# Patient Record
Sex: FEMALE | Race: WHITE | Employment: OTHER | URBAN - NONMETROPOLITAN AREA
[De-identification: names, ages, dates, MRNs, and addresses within clinical notes are randomized per-mention and may not be internally consistent; named-entity substitution may affect disease eponyms.]

---

## 2017-03-28 ENCOUNTER — OFFICE VISIT (OUTPATIENT)
Dept: ORTHOPEDIC SURGERY | Age: 82
End: 2017-03-28

## 2017-03-28 VITALS
BODY MASS INDEX: 21.67 KG/M2 | SYSTOLIC BLOOD PRESSURE: 114 MMHG | WEIGHT: 130.07 LBS | HEART RATE: 72 BPM | HEIGHT: 65 IN | DIASTOLIC BLOOD PRESSURE: 68 MMHG

## 2017-03-28 DIAGNOSIS — M77.51 RETROCALCANEAL BURSITIS, RIGHT: Primary | ICD-10-CM

## 2017-03-28 DIAGNOSIS — M76.61 TENDONITIS, ACHILLES, RIGHT: ICD-10-CM

## 2017-03-28 PROBLEM — M77.50 RETROCALCANEAL BURSITIS: Status: ACTIVE | Noted: 2017-03-28

## 2017-03-28 PROCEDURE — G8419 CALC BMI OUT NRM PARAM NOF/U: HCPCS | Performed by: ORTHOPAEDIC SURGERY

## 2017-03-28 PROCEDURE — 1090F PRES/ABSN URINE INCON ASSESS: CPT | Performed by: ORTHOPAEDIC SURGERY

## 2017-03-28 PROCEDURE — G8427 DOCREV CUR MEDS BY ELIG CLIN: HCPCS | Performed by: ORTHOPAEDIC SURGERY

## 2017-03-28 PROCEDURE — G8484 FLU IMMUNIZE NO ADMIN: HCPCS | Performed by: ORTHOPAEDIC SURGERY

## 2017-03-28 PROCEDURE — 1123F ACP DISCUSS/DSCN MKR DOCD: CPT | Performed by: ORTHOPAEDIC SURGERY

## 2017-03-28 PROCEDURE — 1036F TOBACCO NON-USER: CPT | Performed by: ORTHOPAEDIC SURGERY

## 2017-03-28 PROCEDURE — L4361 PNEUMA/VAC WALK BOOT PRE OTS: HCPCS | Performed by: ORTHOPAEDIC SURGERY

## 2017-03-28 PROCEDURE — 99213 OFFICE O/P EST LOW 20 MIN: CPT | Performed by: ORTHOPAEDIC SURGERY

## 2017-03-28 PROCEDURE — G8598 ASA/ANTIPLAT THER USED: HCPCS | Performed by: ORTHOPAEDIC SURGERY

## 2017-03-28 PROCEDURE — 4040F PNEUMOC VAC/ADMIN/RCVD: CPT | Performed by: ORTHOPAEDIC SURGERY

## 2017-04-11 ENCOUNTER — OFFICE VISIT (OUTPATIENT)
Dept: ORTHOPEDIC SURGERY | Age: 82
End: 2017-04-11

## 2017-04-11 VITALS — HEIGHT: 65 IN | WEIGHT: 130.07 LBS | BODY MASS INDEX: 21.67 KG/M2

## 2017-04-11 DIAGNOSIS — M76.61 TENDONITIS, ACHILLES, RIGHT: ICD-10-CM

## 2017-04-11 DIAGNOSIS — M77.51 RETROCALCANEAL BURSITIS, RIGHT: Primary | ICD-10-CM

## 2017-04-11 PROCEDURE — 4040F PNEUMOC VAC/ADMIN/RCVD: CPT | Performed by: ORTHOPAEDIC SURGERY

## 2017-04-11 PROCEDURE — 99213 OFFICE O/P EST LOW 20 MIN: CPT | Performed by: ORTHOPAEDIC SURGERY

## 2017-04-11 PROCEDURE — 1036F TOBACCO NON-USER: CPT | Performed by: ORTHOPAEDIC SURGERY

## 2017-04-11 PROCEDURE — G8419 CALC BMI OUT NRM PARAM NOF/U: HCPCS | Performed by: ORTHOPAEDIC SURGERY

## 2017-04-11 PROCEDURE — 1090F PRES/ABSN URINE INCON ASSESS: CPT | Performed by: ORTHOPAEDIC SURGERY

## 2017-04-11 PROCEDURE — G8598 ASA/ANTIPLAT THER USED: HCPCS | Performed by: ORTHOPAEDIC SURGERY

## 2017-04-11 PROCEDURE — G8427 DOCREV CUR MEDS BY ELIG CLIN: HCPCS | Performed by: ORTHOPAEDIC SURGERY

## 2017-04-11 PROCEDURE — 1123F ACP DISCUSS/DSCN MKR DOCD: CPT | Performed by: ORTHOPAEDIC SURGERY

## 2017-04-11 RX ORDER — DEXAMETHASONE SODIUM PHOSPHATE 4 MG/ML
INJECTION, SOLUTION INTRA-ARTICULAR; INTRALESIONAL; INTRAMUSCULAR; INTRAVENOUS; SOFT TISSUE
Qty: 30 ML | Refills: 0 | Status: SHIPPED | OUTPATIENT
Start: 2017-04-11 | End: 2018-07-04 | Stop reason: ALTCHOICE

## 2017-04-13 ENCOUNTER — HOSPITAL ENCOUNTER (OUTPATIENT)
Dept: PHYSICAL THERAPY | Age: 82
Discharge: HOME | End: 2017-04-30
Admitting: ORTHOPAEDIC SURGERY

## 2017-04-17 ENCOUNTER — HOSPITAL ENCOUNTER (OUTPATIENT)
Dept: PHYSICAL THERAPY | Age: 82
Discharge: HOME | End: 2017-04-17
Admitting: ORTHOPAEDIC SURGERY

## 2017-05-01 ENCOUNTER — HOSPITAL ENCOUNTER (OUTPATIENT)
Dept: PHYSICAL THERAPY | Age: 82
Discharge: HOME | End: 2017-05-01
Admitting: ORTHOPAEDIC SURGERY

## 2017-05-15 ENCOUNTER — OFFICE VISIT (OUTPATIENT)
Dept: ORTHOPEDIC SURGERY | Age: 82
End: 2017-05-15

## 2017-05-15 VITALS
DIASTOLIC BLOOD PRESSURE: 67 MMHG | BODY MASS INDEX: 21.67 KG/M2 | WEIGHT: 130.07 LBS | HEIGHT: 65 IN | SYSTOLIC BLOOD PRESSURE: 133 MMHG | HEART RATE: 73 BPM

## 2017-05-15 DIAGNOSIS — M75.02 ADHESIVE BURSITIS OF LEFT SHOULDER: Primary | ICD-10-CM

## 2017-05-15 PROCEDURE — 99212 OFFICE O/P EST SF 10 MIN: CPT | Performed by: ORTHOPAEDIC SURGERY

## 2017-05-31 ENCOUNTER — TELEPHONE (OUTPATIENT)
Dept: ORTHOPEDIC SURGERY | Age: 82
End: 2017-05-31

## 2017-06-01 ENCOUNTER — HOSPITAL ENCOUNTER (OUTPATIENT)
Dept: PHYSICAL THERAPY | Age: 82
Discharge: HOME | End: 2017-06-30
Attending: ORTHOPAEDIC SURGERY | Admitting: ORTHOPAEDIC SURGERY

## 2017-06-14 ENCOUNTER — OFFICE VISIT (OUTPATIENT)
Dept: ORTHOPEDIC SURGERY | Age: 82
End: 2017-06-14

## 2017-06-14 VITALS
HEART RATE: 70 BPM | SYSTOLIC BLOOD PRESSURE: 124 MMHG | HEIGHT: 65 IN | DIASTOLIC BLOOD PRESSURE: 78 MMHG | WEIGHT: 130.07 LBS | BODY MASS INDEX: 21.67 KG/M2

## 2017-06-14 DIAGNOSIS — M76.61 RIGHT ACHILLES TENDINITIS: ICD-10-CM

## 2017-06-14 DIAGNOSIS — M79.671 PAIN OF RIGHT HEEL: Primary | ICD-10-CM

## 2017-06-14 PROCEDURE — 1123F ACP DISCUSS/DSCN MKR DOCD: CPT | Performed by: PODIATRIST

## 2017-06-14 PROCEDURE — G8420 CALC BMI NORM PARAMETERS: HCPCS | Performed by: PODIATRIST

## 2017-06-14 PROCEDURE — G8598 ASA/ANTIPLAT THER USED: HCPCS | Performed by: PODIATRIST

## 2017-06-14 PROCEDURE — 1036F TOBACCO NON-USER: CPT | Performed by: PODIATRIST

## 2017-06-14 PROCEDURE — 99213 OFFICE O/P EST LOW 20 MIN: CPT | Performed by: PODIATRIST

## 2017-06-14 PROCEDURE — 1090F PRES/ABSN URINE INCON ASSESS: CPT | Performed by: PODIATRIST

## 2017-06-14 PROCEDURE — G8428 CUR MEDS NOT DOCUMENT: HCPCS | Performed by: PODIATRIST

## 2017-06-14 PROCEDURE — 73650 X-RAY EXAM OF HEEL: CPT | Performed by: PODIATRIST

## 2017-06-14 PROCEDURE — 4040F PNEUMOC VAC/ADMIN/RCVD: CPT | Performed by: PODIATRIST

## 2017-06-14 RX ORDER — DEXAMETHASONE SODIUM PHOSPHATE 4 MG/ML
INJECTION, SOLUTION INTRA-ARTICULAR; INTRALESIONAL; INTRAMUSCULAR; INTRAVENOUS; SOFT TISSUE
Qty: 30 ML | Refills: 0 | Status: SHIPPED | OUTPATIENT
Start: 2017-06-14 | End: 2018-07-04 | Stop reason: ALTCHOICE

## 2017-07-05 ENCOUNTER — OFFICE VISIT (OUTPATIENT)
Dept: ORTHOPEDIC SURGERY | Age: 82
End: 2017-07-05

## 2017-07-05 VITALS
SYSTOLIC BLOOD PRESSURE: 133 MMHG | HEART RATE: 70 BPM | BODY MASS INDEX: 21.67 KG/M2 | DIASTOLIC BLOOD PRESSURE: 75 MMHG | WEIGHT: 130.07 LBS | HEIGHT: 65 IN

## 2017-07-05 DIAGNOSIS — M76.61 TENDONITIS, ACHILLES, RIGHT: Primary | ICD-10-CM

## 2017-07-05 PROCEDURE — G8420 CALC BMI NORM PARAMETERS: HCPCS | Performed by: PODIATRIST

## 2017-07-05 PROCEDURE — 1036F TOBACCO NON-USER: CPT | Performed by: PODIATRIST

## 2017-07-05 PROCEDURE — G8427 DOCREV CUR MEDS BY ELIG CLIN: HCPCS | Performed by: PODIATRIST

## 2017-07-05 PROCEDURE — 1123F ACP DISCUSS/DSCN MKR DOCD: CPT | Performed by: PODIATRIST

## 2017-07-05 PROCEDURE — 1090F PRES/ABSN URINE INCON ASSESS: CPT | Performed by: PODIATRIST

## 2017-07-05 PROCEDURE — 4040F PNEUMOC VAC/ADMIN/RCVD: CPT | Performed by: PODIATRIST

## 2017-07-05 PROCEDURE — G8598 ASA/ANTIPLAT THER USED: HCPCS | Performed by: PODIATRIST

## 2017-07-05 PROCEDURE — 99213 OFFICE O/P EST LOW 20 MIN: CPT | Performed by: PODIATRIST

## 2017-07-05 RX ORDER — CEPHALEXIN 500 MG/1
CAPSULE ORAL
Refills: 0 | COMMUNITY
Start: 2017-05-31 | End: 2017-07-05 | Stop reason: ALTCHOICE

## 2017-07-05 RX ORDER — PREDNISONE 10 MG/1
TABLET ORAL
Qty: 26 TABLET | Refills: 0 | Status: SHIPPED | OUTPATIENT
Start: 2017-07-05 | End: 2018-07-04 | Stop reason: ALTCHOICE

## 2017-07-05 RX ORDER — FUROSEMIDE 20 MG/1
20 TABLET ORAL 2 TIMES DAILY
COMMUNITY
End: 2018-07-04 | Stop reason: ALTCHOICE

## 2017-07-05 RX ORDER — TESTOSTERONE CYPIONATE 200 MG/ML
INJECTION INTRAMUSCULAR
Refills: 0 | COMMUNITY
Start: 2017-04-11 | End: 2018-07-04 | Stop reason: ALTCHOICE

## 2017-07-05 RX ORDER — M-VIT,TX,IRON,MINS/CALC/FOLIC 27MG-0.4MG
1 TABLET ORAL DAILY
COMMUNITY

## 2017-07-12 ENCOUNTER — HOSPITAL ENCOUNTER (OUTPATIENT)
Dept: SURGERY | Age: 82
Discharge: HOME | End: 2017-07-12
Attending: OPHTHALMOLOGY | Admitting: OPHTHALMOLOGY

## 2017-07-12 VITALS — HEART RATE: 103 BPM | SYSTOLIC BLOOD PRESSURE: 143 MMHG | DIASTOLIC BLOOD PRESSURE: 80 MMHG

## 2017-07-12 RX ORDER — PILOCARPINE HYDROCHLORIDE 20 MG/ML
1 SOLUTION/ DROPS OPHTHALMIC ONCE
Status: COMPLETED | OUTPATIENT
Start: 2017-07-12 | End: 2017-07-12

## 2017-07-12 RX ORDER — PREDNISOLONE ACETATE 10 MG/ML
1 SUSPENSION/ DROPS OPHTHALMIC
Status: COMPLETED | OUTPATIENT
Start: 2017-07-12 | End: 2017-07-12

## 2017-07-12 RX ORDER — PROPARACAINE HYDROCHLORIDE 5 MG/ML
1 SOLUTION/ DROPS OPHTHALMIC
Status: COMPLETED | OUTPATIENT
Start: 2017-07-12 | End: 2017-07-12

## 2017-07-12 RX ADMIN — PILOCARPINE HYDROCHLORIDE 1 DROP: 20 SOLUTION/ DROPS OPHTHALMIC at 13:54

## 2017-07-12 RX ADMIN — PROPARACAINE HYDROCHLORIDE 1 DROP: 5 SOLUTION/ DROPS OPHTHALMIC at 14:28

## 2017-07-12 RX ADMIN — PREDNISOLONE ACETATE 1 DROP: 10 SUSPENSION/ DROPS OPHTHALMIC at 14:32

## 2017-07-26 ENCOUNTER — HOSPITAL ENCOUNTER (OUTPATIENT)
Dept: SURGERY | Age: 82
Discharge: HOME | End: 2017-07-26
Attending: OPHTHALMOLOGY | Admitting: OPHTHALMOLOGY

## 2017-07-26 VITALS
HEART RATE: 68 BPM | DIASTOLIC BLOOD PRESSURE: 61 MMHG | OXYGEN SATURATION: 97 % | SYSTOLIC BLOOD PRESSURE: 133 MMHG | RESPIRATION RATE: 14 BRPM

## 2017-07-26 RX ORDER — PREDNISOLONE ACETATE 10 MG/ML
1 SUSPENSION/ DROPS OPHTHALMIC
Status: COMPLETED | OUTPATIENT
Start: 2017-07-26 | End: 2017-07-26

## 2017-07-26 RX ORDER — PROPARACAINE HYDROCHLORIDE 5 MG/ML
1 SOLUTION/ DROPS OPHTHALMIC
Status: COMPLETED | OUTPATIENT
Start: 2017-07-26 | End: 2017-07-26

## 2017-07-26 RX ORDER — PILOCARPINE HYDROCHLORIDE 20 MG/ML
1 SOLUTION/ DROPS OPHTHALMIC
Status: COMPLETED | OUTPATIENT
Start: 2017-07-26 | End: 2017-07-26

## 2017-07-26 RX ADMIN — PROPARACAINE HYDROCHLORIDE 1 DROP: 5 SOLUTION/ DROPS OPHTHALMIC at 14:18

## 2017-07-26 RX ADMIN — PILOCARPINE HYDROCHLORIDE 1 DROP: 20 SOLUTION/ DROPS OPHTHALMIC at 13:09

## 2017-07-26 RX ADMIN — PREDNISOLONE ACETATE 1 DROP: 10 SUSPENSION/ DROPS OPHTHALMIC at 14:23

## 2017-10-10 ENCOUNTER — HOSPITAL ENCOUNTER (OUTPATIENT)
Dept: WOUND CARE | Age: 82
Discharge: HOME | End: 2017-10-10
Attending: CLINICAL NURSE SPECIALIST | Admitting: CLINICAL NURSE SPECIALIST

## 2017-11-13 ENCOUNTER — OFFICE VISIT (OUTPATIENT)
Dept: ORTHOPEDIC SURGERY | Age: 82
End: 2017-11-13

## 2017-11-13 VITALS
HEART RATE: 73 BPM | DIASTOLIC BLOOD PRESSURE: 63 MMHG | BODY MASS INDEX: 21.67 KG/M2 | SYSTOLIC BLOOD PRESSURE: 134 MMHG | WEIGHT: 130.07 LBS | HEIGHT: 65 IN

## 2017-11-13 DIAGNOSIS — M25.512 CHRONIC LEFT SHOULDER PAIN: Primary | ICD-10-CM

## 2017-11-13 DIAGNOSIS — M75.02 ADHESIVE BURSITIS OF LEFT SHOULDER: ICD-10-CM

## 2017-11-13 DIAGNOSIS — S73.102A SPRAIN OF LEFT HIP, INITIAL ENCOUNTER: ICD-10-CM

## 2017-11-13 DIAGNOSIS — G89.29 CHRONIC LEFT SHOULDER PAIN: Primary | ICD-10-CM

## 2017-11-13 PROCEDURE — 73030 X-RAY EXAM OF SHOULDER: CPT | Performed by: ORTHOPAEDIC SURGERY

## 2017-11-13 PROCEDURE — 99213 OFFICE O/P EST LOW 20 MIN: CPT | Performed by: ORTHOPAEDIC SURGERY

## 2017-11-13 NOTE — PROGRESS NOTES
calcification in the subacromial space and not sure if this can be resected or if the patient is a candidate for an arthroplasty. She'll follow-up with Dr. Neetu Zapien for his evaluation and recommendations.     Demetri Aguilar

## 2017-12-05 ENCOUNTER — OFFICE VISIT (OUTPATIENT)
Dept: ORTHOPEDIC SURGERY | Age: 82
End: 2017-12-05

## 2017-12-05 ENCOUNTER — TELEPHONE (OUTPATIENT)
Dept: ORTHOPEDIC SURGERY | Age: 82
End: 2017-12-05

## 2017-12-05 VITALS — WEIGHT: 130.07 LBS | HEIGHT: 63 IN | BODY MASS INDEX: 23.05 KG/M2

## 2017-12-05 DIAGNOSIS — M25.512 LEFT SHOULDER PAIN, UNSPECIFIED CHRONICITY: Primary | ICD-10-CM

## 2017-12-05 DIAGNOSIS — M75.112 INCOMPLETE TEAR OF LEFT ROTATOR CUFF: ICD-10-CM

## 2017-12-05 DIAGNOSIS — S40.012D CONTUSION OF LEFT SHOULDER, SUBSEQUENT ENCOUNTER: ICD-10-CM

## 2017-12-05 DIAGNOSIS — M75.02 ADHESIVE CAPSULITIS OF LEFT SHOULDER: ICD-10-CM

## 2017-12-05 PROCEDURE — 99213 OFFICE O/P EST LOW 20 MIN: CPT | Performed by: ORTHOPAEDIC SURGERY

## 2017-12-05 NOTE — TELEPHONE ENCOUNTER
Received request from Mitra Menchaca rn W/[facility] asking for Dr. Charles Lin to review Rx medications. Paper work scanned into [employer] w/his clinic has been notified this was in [address] [place] [address] for review.     Sent message via in-basket staff message to Rachele TRINH

## 2017-12-05 NOTE — PROGRESS NOTES
REFERRING PHYSICIAN: Tori Dunlap M.D.    [address] [place] [address]:  Left shoulder pain     HISTORY OF PRESENT ILLNESS:  Viv Blanco is a [id number] right-hand-dominant female who presents today for evaluation and consultation of her left shoulder at the request of Tori Dunlap M.D.  she's had a long history of left shoulder problems. She had surgery in 1992 for rotator cuff repair. I do not have that operative report to review. The patient states that she was injured at work in 2003. 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 She was injured when she fell. Since that time, she describes significant left shoulder dysfunction consistent with a pseudo-paralytic extremity. Her symptoms is not significantly worsened since then but she seems to be more disabled as a result them. REVIEW OF SYSTEMS:  Relevant review of systems reviewed and available in the patient's chart. PHYSICAL EXAMINATION: Inspection of the left shoulder reveals warm, dry, intact skin. There is no adenopathy. The distal neurovascular exam is grossly intact. There is some deltoid atrophy. She has less than 20° of active abduction and forward elevation. If the arm is position at 90° abduction. She is able to maintain obtain the extremity in that position. The remainder of exam is limited by significant weakness and guarding. Sensation is intact about the lateral aspect the shoulder. There is no obvious glenohumeral instability. Examination of the contralateral shoulder reveals no atrophy or deformity. The skin is warm and dry. Range of motion is within normal limits. There is no focal tenderness with palpation. Provocative SLAP, biceps tension, apprehension AC joint or rotator cuff tests are negative. Strength is graded 5/5 in all muscle groups. The distal neurovascular exam is grossly intact. Cervical spine: The skin is warm and dry. There is no swelling, warmth, or erythema. Range of motion is within normal limits. There is no paraspinal or spinous process tenderness. Spurling's sign is negative and did not produce shoulder pain. The distal neurovascular exam is grossly intact. X-RAYS: 4 views of the left shoulder were obtained in the office and reviewed today. There is some generalized osteopenia and some mild glenohumeral arthritis. There are 2 retained metallic anchors in the greater tuberosity. There is near complete obliteration of the acromiohumeral interval.  There are some changes about the acromion and it is possible that she has an acromial spine fracture. There are degenerative changes about the acromioclavicular joint. ASSESSMENT:    1. Left shoulder pseudoparalysis  2. Massive rotator cuff tear  3. Possible acromial spine fracture    PLAN:  I had a detailed discussion with Viry Avelar and her family. Although she is quite limited as a result of her shoulder pathology, she is not interested in pursuing surgery at this time. Should she consider surgical options, she would 1st benefit from a CT scan to identify the presence or absence of any acromial spine pathology. I'll see her back at her request.  She and her family agreed with our plan.

## 2018-03-27 ENCOUNTER — HOSPITAL ENCOUNTER (OUTPATIENT)
Dept: OTHER | Age: 83
Discharge: HOME | End: 2018-03-27
Attending: PHYSICIAN ASSISTANT | Admitting: PHYSICIAN ASSISTANT

## 2018-03-27 DIAGNOSIS — R06.02 SHORTNESS OF BREATH: ICD-10-CM

## 2018-07-04 PROBLEM — R07.9 CHEST PAIN: Status: ACTIVE | Noted: 2018-07-04

## 2018-07-27 ENCOUNTER — OFFICE VISIT (OUTPATIENT)
Dept: ORTHOPEDIC SURGERY | Age: 83
End: 2018-07-27

## 2018-07-27 VITALS
HEIGHT: 68 IN | HEART RATE: 97 BPM | BODY MASS INDEX: 21.98 KG/M2 | WEIGHT: 145 LBS | SYSTOLIC BLOOD PRESSURE: 132 MMHG | DIASTOLIC BLOOD PRESSURE: 74 MMHG

## 2018-07-27 DIAGNOSIS — M70.62 TROCHANTERIC BURSITIS OF LEFT HIP: Primary | ICD-10-CM

## 2018-07-27 DIAGNOSIS — M75.02 ADHESIVE BURSITIS OF LEFT SHOULDER: ICD-10-CM

## 2018-07-27 PROCEDURE — 99212 OFFICE O/P EST SF 10 MIN: CPT | Performed by: ORTHOPAEDIC SURGERY

## 2018-07-27 RX ORDER — CITALOPRAM 10 MG/1
TABLET ORAL
Status: ON HOLD | COMMUNITY
Start: 2018-06-21 | End: 2019-09-13 | Stop reason: HOSPADM

## 2018-07-27 RX ORDER — DULOXETIN HYDROCHLORIDE 20 MG/1
CAPSULE, DELAYED RELEASE ORAL
Status: ON HOLD | COMMUNITY
End: 2019-09-13 | Stop reason: HOSPADM

## 2018-07-27 RX ORDER — ATORVASTATIN CALCIUM 40 MG/1
TABLET, FILM COATED ORAL
Status: ON HOLD | COMMUNITY
Start: 2018-07-05 | End: 2019-09-13 | Stop reason: HOSPADM

## 2018-07-27 RX ORDER — FENOFIBRATE 145 MG/1
200 TABLET, COATED ORAL
COMMUNITY

## 2018-07-27 NOTE — PROGRESS NOTES
History  Lisa Chinchilla is a 80 y.o. female who returns for follow-up of her left shoulder. She is developed significant arthritic changes in her left shoulder and has been diagnosed with a ventricular bursitis the left hip and now reports she is getting similar symptoms on her right hip. She reports today that they have subsided and it improved. .   Symptoms are has improved. Pain level today is 3/10. Treatment to date includes topical anti-inflammatory diclofenac . Patient's medications, allergies, past medical, surgical, social and family histories were reviewed and updated as appropriate. Review of Systems  Relevant review of systems reviewed and available in the patient's chart    Primary Area of CC: Patient with some residual tenderness along the right hip left hip is asymptomatic on exam today. She continues to have a degenerative symptoms in her left shoulder. She has been evaluated by Dr. Lynda Arce in the past for shoulder arthroplasty but at her age she elected not to proceed with any intervention. Examination proximal and distal to the injured area show good overall ROM, no bony prominence or soft tissue tenderness, no instability or excessive stiffness. Radiology:       Impression   Diagnosis Orders   1. Trochanteric bursitis of left hip     2. Adhesive bursitis of left shoulder              Plan  The patient would like to follow-up in the surgery in the [place] office. He can follow up either with Dr. Marylene Blades with Dr. Horace Boss.     Amando Farris

## 2018-08-21 ENCOUNTER — OFFICE VISIT (OUTPATIENT)
Dept: ORTHOPEDIC SURGERY | Age: 83
End: 2018-08-21

## 2018-08-21 VITALS — BODY MASS INDEX: 22.2 KG/M2 | HEIGHT: 64 IN | WEIGHT: 130 LBS

## 2018-08-21 DIAGNOSIS — R52 PAIN: Primary | ICD-10-CM

## 2018-08-21 DIAGNOSIS — M75.112 INCOMPLETE TEAR OF LEFT ROTATOR CUFF: ICD-10-CM

## 2018-08-21 DIAGNOSIS — M70.62 TROCHANTERIC BURSITIS OF LEFT HIP: ICD-10-CM

## 2018-08-21 PROCEDURE — 99213 OFFICE O/P EST LOW 20 MIN: CPT | Performed by: ORTHOPAEDIC SURGERY

## 2018-08-21 NOTE — PROGRESS NOTES
Chief Complaint:  Hip Pain (Fell on ice in  landing on her L hip/shoulder - HAS BEEN A SHYBUT PATIENTS SINCE THE S - NO SX ON HIP/SHOULDER - SAW [facility] IN 2017 REQUESTED CT SCAN- DOESNT REMEBER IF SHE HAD IT DONE , Massive rotator cuff tear, )      SUBJECTIVE:  Juaquin Blancas is a 80 y.o. female who returns today for evaluation of left hip and left shoulder, she has been on long-standing patient of Dr. Asif Carranza and has been under his care for workman's comp injury that occurred in , and his skilled nursing she is being referred to me to continue treatment. Treatment to date includes Voltaren cream which seems to alleviate her symptoms. Patient is also well known to me as I treated her in the past for lower extremity weakness, and also treated her   for knee osteoarthritis. Today she has 0 out of 10 pain in the hip and shoulder she has significant limitations with using the left shoulder secondary to pseudoparalysis    Unrelated to her shoulder and hip pain patient is very tearful on exam today stating that she is having a difficult time doing her activities of daily living including making meals, doing laundry, taking medications and she does not have family member readily available to help her. She is asking for guidance in this area. Pain Assessment:  Pain Assessment  Location of Pain: Pelvis  Location Modifiers: Left  Severity of Pain: 0  Work-Related Injury: Yes  Are there other pain locations you wish to document?: No      OBJECTIVE:  Vital Signs:  Vitals:    18 1051   Weight: 130 lb (59 kg)   Height: 5' 4\" (1.626 m)       Appearance: alert, well appearing, and in no distress, oriented to person, place, and time and normal appearing weight. Physical exam:   She has tenderness to palpation to the left hip greater trochanter consistent with bursitis.   She is currently ambulating without any assistive devices she has no pain with flexion, extension or internal and external rotation. She has 4 out of 5 strength with hip flexion and extension. Distal neurovascular exam is intact. She has limited use of the left arm with active forward flexion to proximally 45°, she is unable to hold her arm at shoulder height against gravity consistent with a positive drop arm test, she does contract the deltoid and is able to abductor arm to 45°. She has some discomfort to palpation over the proximal humerus. 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  Pain Yes    Incomplete tear of left rotator cuff     Trochanteric bursitis of left hip        Office Procedures:  No orders of the defined types were placed in this encounter. No orders of the defined types were placed in this encounter. Treatment Plan: At this point we will continue with the conservative treatment she has been receiving by Dr. Hudson Neumann, , we've renewed her Voltaren cream, she will use that as needed. He'll follow up with me every 4-6 months for reevaluation. Patient agrees with this plan, all of their questions were answered best of our ability and to their satisfaction. My assistant will get in touch with [facility] senior care services see if there is any homecare assistance she will be eligible for.     Ivana Orellana

## 2018-08-24 ENCOUNTER — TELEPHONE (OUTPATIENT)
Dept: ORTHOPEDIC SURGERY | Age: 83
End: 2018-08-24

## 2018-08-24 ENCOUNTER — HOSPITAL ENCOUNTER (OUTPATIENT)
Age: 83
Discharge: HOME | End: 2018-08-24
Payer: MEDICARE

## 2018-08-24 ENCOUNTER — HOSPITAL ENCOUNTER (OUTPATIENT)
Dept: GENERAL RADIOLOGY | Age: 83
Discharge: HOME | End: 2018-08-24
Payer: MEDICARE

## 2018-08-24 DIAGNOSIS — M25.551 RIGHT HIP PAIN: ICD-10-CM

## 2018-08-24 PROCEDURE — 73502 X-RAY EXAM HIP UNI 2-3 VIEWS: CPT

## 2019-01-15 ENCOUNTER — OFFICE VISIT (OUTPATIENT)
Dept: ORTHOPEDIC SURGERY | Age: 84
End: 2019-01-15
Payer: COMMERCIAL

## 2019-01-15 VITALS — HEIGHT: 64 IN | BODY MASS INDEX: 22.21 KG/M2 | WEIGHT: 130.07 LBS

## 2019-01-15 DIAGNOSIS — M70.62 TROCHANTERIC BURSITIS OF LEFT HIP: ICD-10-CM

## 2019-01-15 DIAGNOSIS — S73.102A SPRAIN OF LEFT HIP, INITIAL ENCOUNTER: ICD-10-CM

## 2019-01-15 DIAGNOSIS — M81.0 OSTEOPOROSIS, UNSPECIFIED OSTEOPOROSIS TYPE, UNSPECIFIED PATHOLOGICAL FRACTURE PRESENCE: Primary | ICD-10-CM

## 2019-01-15 PROCEDURE — 99213 OFFICE O/P EST LOW 20 MIN: CPT | Performed by: ORTHOPAEDIC SURGERY

## 2019-05-01 DIAGNOSIS — M75.112 INCOMPLETE TEAR OF LEFT ROTATOR CUFF: ICD-10-CM

## 2019-05-06 ENCOUNTER — HOSPITAL ENCOUNTER (OUTPATIENT)
Dept: CT IMAGING | Age: 84
Discharge: HOME | End: 2019-05-06
Payer: MEDICARE

## 2019-05-06 ENCOUNTER — HOSPITAL ENCOUNTER (OUTPATIENT)
Age: 84
Discharge: HOME | End: 2019-05-06
Payer: MEDICARE

## 2019-05-06 DIAGNOSIS — R42 DIZZINESS: ICD-10-CM

## 2019-05-06 DIAGNOSIS — R51.9 GENERALIZED HEADACHE: ICD-10-CM

## 2019-05-06 LAB
ANION GAP SERPL CALCULATED.3IONS-SCNC: 10 MMOL/L (ref 3–16)
BUN BLDV-MCNC: 21 MG/DL (ref 7–20)
CALCIUM SERPL-MCNC: 10.3 MG/DL (ref 8.3–10.6)
CHLORIDE BLD-SCNC: 103 MMOL/L (ref 99–110)
CO2: 25 MMOL/L (ref 21–32)
CREAT SERPL-MCNC: 0.9 MG/DL (ref 0.6–1.2)
GFR AFRICAN AMERICAN: >60
GFR NON-AFRICAN AMERICAN: 59
GLUCOSE BLD-MCNC: 123 MG/DL (ref 70–99)
POTASSIUM SERPL-SCNC: 4.3 MMOL/L (ref 3.5–5.1)
SODIUM BLD-SCNC: 138 MMOL/L (ref 136–145)

## 2019-05-06 PROCEDURE — 70470 CT HEAD/BRAIN W/O & W/DYE: CPT

## 2019-05-06 PROCEDURE — 80048 BASIC METABOLIC PNL TOTAL CA: CPT

## 2019-05-06 PROCEDURE — 6360000004 HC RX CONTRAST MEDICATION: Performed by: INTERNAL MEDICINE

## 2019-05-06 PROCEDURE — 36415 COLL VENOUS BLD VENIPUNCTURE: CPT

## 2019-05-06 RX ADMIN — IOPAMIDOL 75 ML: 755 INJECTION, SOLUTION INTRAVENOUS at 17:57

## 2019-05-12 ENCOUNTER — APPOINTMENT (OUTPATIENT)
Dept: GENERAL RADIOLOGY | Age: 84
End: 2019-05-12
Payer: MEDICARE

## 2019-05-12 ENCOUNTER — APPOINTMENT (OUTPATIENT)
Dept: CT IMAGING | Age: 84
End: 2019-05-12
Payer: MEDICARE

## 2019-05-12 ENCOUNTER — HOSPITAL ENCOUNTER (EMERGENCY)
Age: 84
Discharge: HOME | End: 2019-05-12
Attending: EMERGENCY MEDICINE
Payer: MEDICARE

## 2019-05-12 VITALS
OXYGEN SATURATION: 98 % | DIASTOLIC BLOOD PRESSURE: 86 MMHG | HEART RATE: 76 BPM | TEMPERATURE: 97.8 F | SYSTOLIC BLOOD PRESSURE: 141 MMHG | RESPIRATION RATE: 16 BRPM

## 2019-05-12 DIAGNOSIS — S22.32XA CLOSED FRACTURE OF ONE RIB OF LEFT SIDE, INITIAL ENCOUNTER: Primary | ICD-10-CM
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
## 2019-05-12 LAB
A/G RATIO: 1.3 (ref 1.1–2.2)
ALBUMIN SERPL-MCNC: 4.3 G/DL (ref 3.4–5)
ALP BLD-CCNC: 59 U/L (ref 40–129)
ALT SERPL-CCNC: 81 U/L (ref 10–40)
ANION GAP SERPL CALCULATED.3IONS-SCNC: 13 MMOL/L (ref 3–16)
AST SERPL-CCNC: 79 U/L (ref 15–37)
BASOPHILS ABSOLUTE: 0.1 K/UL (ref 0–0.2)
BASOPHILS RELATIVE PERCENT: 1.4 %
BILIRUB SERPL-MCNC: 0.5 MG/DL (ref 0–1)
BUN BLDV-MCNC: 23 MG/DL (ref 7–20)
CALCIUM SERPL-MCNC: 10.2 MG/DL (ref 8.3–10.6)
CHLORIDE BLD-SCNC: 103 MMOL/L (ref 99–110)
CO2: 25 MMOL/L (ref 21–32)
CREAT SERPL-MCNC: 0.7 MG/DL (ref 0.6–1.2)
EOSINOPHILS ABSOLUTE: 0.2 K/UL (ref 0–0.6)
EOSINOPHILS RELATIVE PERCENT: 3.4 %
GFR AFRICAN AMERICAN: >60
GFR NON-AFRICAN AMERICAN: >60
GLOBULIN: 3.4 G/DL
GLUCOSE BLD-MCNC: 174 MG/DL (ref 70–99)
HCT VFR BLD CALC: 42.8 % (ref 36–48)
HEMOGLOBIN: 14.6 G/DL (ref 12–16)
INR BLD: 1.23 (ref 0.86–1.14)
LYMPHOCYTES ABSOLUTE: 1.7 K/UL (ref 1–5.1)
LYMPHOCYTES RELATIVE PERCENT: 28.4 %
MCH RBC QN AUTO: 33.5 PG (ref 26–34)
MCHC RBC AUTO-ENTMCNC: 34.1 G/DL (ref 31–36)
MCV RBC AUTO: 98.2 FL (ref 80–100)
MONOCYTES ABSOLUTE: 0.4 K/UL (ref 0–1.3)
MONOCYTES RELATIVE PERCENT: 6.9 %
NEUTROPHILS ABSOLUTE: 3.6 K/UL (ref 1.7–7.7)
NEUTROPHILS RELATIVE PERCENT: 59.9 %
PDW BLD-RTO: 11.8 % (ref 12.4–15.4)
PLATELET # BLD: 118 K/UL (ref 135–450)
PMV BLD AUTO: 10.4 FL (ref 5–10.5)
POTASSIUM SERPL-SCNC: 4.7 MMOL/L (ref 3.5–5.1)
PROTHROMBIN TIME: 13.9 SEC (ref 9.8–13)
RBC # BLD: 4.36 M/UL (ref 4–5.2)
SODIUM BLD-SCNC: 141 MMOL/L (ref 136–145)
TOTAL PROTEIN: 7.7 G/DL (ref 6.4–8.2)
WBC # BLD: 6 K/UL (ref 4–11)

## 2019-05-12 PROCEDURE — 80053 COMPREHEN METABOLIC PANEL: CPT

## 2019-05-12 PROCEDURE — 85025 COMPLETE CBC W/AUTO DIFF WBC: CPT

## 2019-05-12 PROCEDURE — 85610 PROTHROMBIN TIME: CPT

## 2019-05-12 PROCEDURE — 74177 CT ABD & PELVIS W/CONTRAST: CPT

## 2019-05-12 PROCEDURE — 6360000004 HC RX CONTRAST MEDICATION: Performed by: EMERGENCY MEDICINE

## 2019-05-12 PROCEDURE — 96374 THER/PROPH/DIAG INJ IV PUSH: CPT

## 2019-05-12 PROCEDURE — 6360000002 HC RX W HCPCS: Performed by: EMERGENCY MEDICINE

## 2019-05-12 PROCEDURE — 2580000003 HC RX 258: Performed by: EMERGENCY MEDICINE

## 2019-05-12 PROCEDURE — 96375 TX/PRO/DX INJ NEW DRUG ADDON: CPT

## 2019-05-12 PROCEDURE — 71101 X-RAY EXAM UNILAT RIBS/CHEST: CPT

## 2019-05-12 PROCEDURE — 6360000002 HC RX W HCPCS

## 2019-05-12 PROCEDURE — 36415 COLL VENOUS BLD VENIPUNCTURE: CPT

## 2019-05-12 PROCEDURE — 99284 EMERGENCY DEPT VISIT MOD MDM: CPT

## 2019-05-12 RX ORDER — APIXABAN 2.5 MG/1
2.5 TABLET, FILM COATED ORAL DAILY
Refills: 6 | Status: ON HOLD | COMMUNITY
Start: 2019-04-30 | End: 2019-09-13 | Stop reason: HOSPADM

## 2019-05-12 RX ORDER — SODIUM CHLORIDE 9 MG/ML
INJECTION, SOLUTION INTRAVENOUS CONTINUOUS
Status: DISCONTINUED | OUTPATIENT
Start: 2019-05-12 | End: 2019-05-12 | Stop reason: HOSPADM

## 2019-05-12 RX ORDER — ONDANSETRON 2 MG/ML
4 INJECTION INTRAMUSCULAR; INTRAVENOUS ONCE
Status: COMPLETED | OUTPATIENT
Start: 2019-05-12 | End: 2019-05-12

## 2019-05-12 RX ORDER — MORPHINE SULFATE 4 MG/ML
4 INJECTION, SOLUTION INTRAMUSCULAR; INTRAVENOUS ONCE
Status: DISCONTINUED | OUTPATIENT
Start: 2019-05-12 | End: 2019-05-12 | Stop reason: HOSPADM

## 2019-05-12 RX ORDER — MORPHINE SULFATE 10 MG/ML
INJECTION, SOLUTION INTRAMUSCULAR; INTRAVENOUS
Status: COMPLETED
Start: 2019-05-12 | End: 2019-05-12

## 2019-05-12 RX ORDER — HYDROCODONE BITATRATE AND ACETAMINOPHEN 5; 325 MG/1; MG/1
1 TABLET ORAL EVERY 6 HOURS PRN
Qty: 10 TABLET | Refills: 0 | Status: SHIPPED | OUTPATIENT
Start: 2019-05-12 | End: 2019-05-16

## 2019-05-12 RX ADMIN — SODIUM CHLORIDE: 9 INJECTION, SOLUTION INTRAVENOUS at 10:31

## 2019-05-12 RX ADMIN — MORPHINE SULFATE 10 MG: 10 INJECTION INTRAVENOUS at 10:31

## 2019-05-12 RX ADMIN — IOPAMIDOL 75 ML: 755 INJECTION, SOLUTION INTRAVENOUS at 11:15

## 2019-05-12 RX ADMIN — ONDANSETRON 4 MG: 2 INJECTION INTRAMUSCULAR; INTRAVENOUS at 10:31

## 2019-05-12 ASSESSMENT — ENCOUNTER SYMPTOMS
WHEEZING: 0
SHORTNESS OF BREATH: 1
CHEST TIGHTNESS: 0
CHOKING: 0
ABDOMINAL PAIN: 0
BACK PAIN: 1

## 2019-05-12 ASSESSMENT — PAIN SCALES - GENERAL: PAINLEVEL_OUTOF10: 9

## 2019-05-12 NOTE — ED PROVIDER NOTES
Patient states that she was fearful this was her last breath she said every time she breathes it hurt tremendously  She laid there for a while and could not get up but then finally was able to get up she called her son and they brought her out here by car    The history is provided by the patient. Fall   The accident occurred less than 1 hour ago. The fall occurred while walking. She fell from a height of 3 to 5 ft. Impact surface: Impact service was a corner R side of the bathtub. There was no blood loss. Point of impact: Left ribs. Pain location: Left ribs. The pain is at a severity of 10/10. She was ambulatory at the scene. There was no entrapment after the fall. There was no drug use involved in the accident. There was no alcohol use involved in the accident. Pertinent negatives include no fever, no numbness, no abdominal pain, no hematuria, no headaches and no loss of consciousness. The symptoms are aggravated by activity. She has tried nothing for the symptoms. Review of Systems   Constitutional: Positive for activity change and appetite change. Negative for chills and fever. HENT: Negative for congestion. Eyes: Negative for visual disturbance. Respiratory: Positive for shortness of breath. Negative for choking, chest tightness and wheezing. Cardiovascular: Positive for chest pain. Negative for palpitations and leg swelling. Gastrointestinal: Negative for abdominal pain. Genitourinary: Negative for difficulty urinating, hematuria, menstrual problem, pelvic pain and urgency. Musculoskeletal: Positive for back pain. Negative for neck pain. Neurological: Negative for dizziness, tremors, seizures, loss of consciousness, syncope, facial asymmetry, speech difficulty, weakness, light-headedness, numbness and headaches. Psychiatric/Behavioral: Negative for behavioral problems. All other systems reviewed and are negative.     Patient Vitals for the past 24 hrs:   BP Temp Temp src Pulse Resp SpO2   05/12/19 1008 (!) 149/66 97.8 °F (36.6 °C) Oral 69 18 98 %       Physical Exam   Constitutional: She is oriented to person, place, and time. Vital signs are normal. She appears well-developed and well-nourished. She appears distressed. HENT:   Head: Normocephalic. Eyes: Pupils are equal, round, and reactive to light. Conjunctivae and EOM are normal.   Neck: Normal range of motion. Neck supple. No thyromegaly present. Cardiovascular: Normal rate, regular rhythm, normal heart sounds and intact distal pulses. Exam reveals no gallop and no friction rub. No murmur heard. Pulmonary/Chest: Effort normal and breath sounds normal. No respiratory distress. She has no wheezes. She has no rhonchi. She has no rales. Abdominal: Soft. Bowel sounds are normal. She exhibits no distension. There is no tenderness. Neurological: She is alert and oriented to person, place, and time. She displays normal reflexes. No cranial nerve deficit or sensory deficit. She exhibits normal muscle tone. Coordination normal. GCS eye subscore is 4. GCS verbal subscore is 5. GCS motor subscore is 6. Skin: She is not diaphoretic. Psychiatric: She has a normal mood and affect. Her behavior is normal.   Nursing note and vitals reviewed. Procedures    MDM         Labs      Radiology      EKG Interpretation.      Past Medical History:   Diagnosis Date    Arthritis     CAD (coronary artery disease)     Cancer of cheek ([facility] [address] 4/15/2010    Diabetes mellitus ([facility] [address] [facility]     Hyperlipidemia     Hypertension     Osteoporosis 1/15/2019    Retrocalcaneal bursitis 3/28/2017    TB (pulmonary tuberculosis) 2010       Past Surgical History:   Procedure Laterality Date    CARDIAC CATHETERIZATION      x 4 all normal    CHOLECYSTECTOMY, LAPAROSCOPIC N/A 5/1/13    LIVER BIOPSY; EXCISION OF NECROTIC LYMP NODE OF CROQUET; UMBILICAL HERNIA REPAIR    LUNG SURGERY      partial right lower lobectomy    SHOULDER SURGERY      THROAT SURGERY      vocal cord polyp       History reviewed. No pertinent family history. Social History     Socioeconomic History    Marital status:       Spouse name: Not on file    Number of children: Not on file    Years of education: Not on file    Highest education level: Not on file   Occupational History    Not on file   Social Needs    Financial resource strain: Not on file    Food insecurity:     Worry: Not on file     Inability: Not on file    Transportation needs:     Medical: Not on file     Non-medical: Not on file   Tobacco Use    Smoking status: Never Smoker    Smokeless tobacco: Never Used   Substance and Sexual Activity    Alcohol use: No     Alcohol/week: 0.0 oz    Drug use: No    Sexual activity: Not Currently     Partners: Male   Lifestyle    Physical activity:     Days per week: Not on file     Minutes per session: Not on file    Stress: Not on file   Relationships    Social connections:     Talks on phone: Not on file     Gets together: Not on file     Attends Worship service: Not on file     Active member of club or organization: Not on file     Attends meetings of clubs or organizations: Not on file     Relationship status: Not on file    Intimate partner violence:     Fear of current or ex partner: Not on file     Emotionally abused: Not on file     Physically abused: Not on file     Forced sexual activity: Not on file   Other Topics Concern    Not on file   Social History Narrative    Not on file       Patient Vitals for the past 24 hrs:   BP Temp Temp src Pulse Resp SpO2   05/12/19 1048 (!) 156/96 -- -- 61 16 97 %   05/12/19 1008 (!) 149/66 97.8 °F (36.6 °C) Oral 69 18 98 %         Medications   0.9 % sodium chloride infusion ( Intravenous New Bag 5/12/19 1031)   morphine (PF) injection 4 mg (4 mg Intravenous Not Given 5/12/19 1031)   ondansetron (ZOFRAN) injection 4 mg (4 mg Intravenous Given 5/12/19 1031)   morphine 10 MG/ML injection (10 mg  Given 5/12/19 1031) iopamidol (ISOVUE-370) 76 % injection 75 mL (75 mLs Intravenous Given 5/12/19 1115)       Results for orders placed or performed during the hospital encounter of 05/12/19   CBC Auto Differential   Result Value Ref Range    WBC 6.0 4.0 - 11.0 K/uL    RBC 4.36 4.00 - 5.20 M/uL    Hemoglobin 14.6 12.0 - 16.0 g/dL    Hematocrit 42.8 36.0 - 48.0 %    MCV 98.2 80.0 - 100.0 fL    MCH 33.5 26.0 - 34.0 pg    MCHC 34.1 31.0 - 36.0 g/dL    RDW 11.8 (L) 12.4 - 15.4 %    Platelets 903 (L) 942 - 450 K/uL    MPV 10.4 5.0 - 10.5 fL    Neutrophils % 59.9 %    Lymphocytes % 28.4 %    Monocytes % 6.9 %    Eosinophils % 3.4 %    Basophils % 1.4 %    Neutrophils # 3.6 1.7 - 7.7 K/uL    Lymphocytes # 1.7 1.0 - 5.1 K/uL    Monocytes # 0.4 0.0 - 1.3 K/uL    Eosinophils # 0.2 0.0 - 0.6 K/uL    Basophils # 0.1 0.0 - 0.2 K/uL   Comprehensive Metabolic Panel   Result Value Ref Range    Sodium 141 136 - 145 mmol/L    Potassium 4.7 3.5 - 5.1 mmol/L    Chloride 103 99 - 110 mmol/L    CO2 25 21 - 32 mmol/L    Anion Gap 13 3 - 16    Glucose 174 (H) 70 - 99 mg/dL    BUN 23 (H) 7 - 20 mg/dL    CREATININE 0.7 0.6 - 1.2 mg/dL    GFR Non-African American >60 >60    GFR African American >60 >60    Calcium 10.2 8.3 - 10.6 mg/dL    Total Protein 7.7 6.4 - 8.2 g/dL    Alb 4.3 3.4 - 5.0 g/dL    Albumin/Globulin Ratio 1.3 1.1 - 2.2    Total Bilirubin 0.5 0.0 - 1.0 mg/dL    Alkaline Phosphatase 59 40 - 129 U/L    ALT 81 (H) 10 - 40 U/L    AST 79 (H) 15 - 37 U/L    Globulin 3.4 g/dL   Protime-INR   Result Value Ref Range    Protime 13.9 (H) 9.8 - 13.0 sec    INR 1.23 (H) 0.86 - 1.14       Xr Ribs Left Include Chest (min 3 Views)    Result Date: 5/12/2019  EXAMINATION: 3 XRAY VIEWS OF THE LEFT RIBS WITH FRONTAL XRAY VIEW OF THE CHEST 5/12/2019 11:06 am COMPARISON: Chest x-ray July 4, 2018 HISTORY: ORDERING SYSTEM PROVIDED HISTORY: FALL ON LEFT POST RIBS APPROX 7-11 TECHNOLOGIST PROVIDED HISTORY: Reason for exam:->FALL ON LEFT POST RIBS APPROX 7-11 Ordering Physician Provided Reason for Exam: FALL HITTING BACK ON TUB, LEFT POSTERIOR RIB  PAIN, SOB Acuity: Acute Type of Exam: Initial FINDINGS: The cardiomediastinal silhouette is at the upper limits of normal in size. There is atelectasis, pleural thickening and mild pleural effusion at the right lung base. There is no acute osseous abnormality. There is no acute left-sided rib fracture. There postoperative changes in the left humeral head. No acute fracture of the left ribs. Atelectasis, pleural thickening and mild pleural effusion at the right lung base. Ct Head W Wo Contrast    Result Date: 5/6/2019  EXAMINATION: CT OF THE HEAD WITH AND WITHOUT CONTRAST  5/6/2019 5:35 pm TECHNIQUE: CT of the head/brain was performed without and with the administration of intravenous contrast. Multiplanar reformatted images are provided for review. Dose modulation, iterative reconstruction, and/or weight based adjustment of the mA/kV was utilized to reduce the radiation dose to as low as reasonably achievable. COMPARISON: 10/10/2016 HISTORY: ORDERING SYSTEM PROVIDED HISTORY: Generalized headache TECHNOLOGIST PROVIDED HISTORY: Ordering Physician Provided Reason for Exam: HA,Dizzy FINDINGS: BRAIN/VENTRICLES: There is no acute intracranial hemorrhage, mass effect or midline shift. No abnormal extra-axial fluid collection. The gray-white differentiation is maintained without evidence of an acute infarct. There is no evidence of hydrocephalus. There is no abnormal intracranial enhancement. Mild chronic white matter microvascular ischemic changes are noted. ORBITS: The visualized portion of the orbits demonstrate no acute abnormality. SINUSES: The visualized paranasal sinuses and mastoid air cells demonstrate no acute abnormality. SOFT TISSUES/SKULL:  No acute abnormality of the visualized skull or soft tissues. 1. No acute intracranial abnormality. 2. Mild chronic white matter microvascular ischemic changes.      Ct Abdomen

## 2019-05-12 NOTE — ED NOTES
AVS provided and reviewed with the patient and her children; all verbalized understanding of care at home, follow up care, and emergent symptoms to return for. Also verbalized understanding of medication side effects and restrictions. Verbalized understanding of deep breathing at home when awake. No questions or concerns verbalized at this time. The patient is alert, oriented, stable, and assisted out of the department via wheelchair at the time of discharge.        Gaurav Zhou RN  05/12/19 [id number]

## 2019-05-12 NOTE — ED NOTES
Patient ambulated to the restroom and returned to room x1 assist.      Abdoul Chavira RN  05/12/19 [phone]

## 2019-06-01 ENCOUNTER — HOSPITAL ENCOUNTER (OUTPATIENT)
Age: 84
Discharge: HOME | End: 2019-06-01
Payer: MEDICARE

## 2019-06-01 ENCOUNTER — HOSPITAL ENCOUNTER (OUTPATIENT)
Dept: GENERAL RADIOLOGY | Age: 84
Discharge: HOME | End: 2019-06-01
Payer: MEDICARE

## 2019-06-01 DIAGNOSIS — R07.81 RIB PAIN ON LEFT SIDE: ICD-10-CM

## 2019-06-01 PROCEDURE — 71101 X-RAY EXAM UNILAT RIBS/CHEST: CPT

## 2019-07-16 ENCOUNTER — OFFICE VISIT (OUTPATIENT)
Dept: ORTHOPEDIC SURGERY | Age: 84
End: 2019-07-16
Payer: COMMERCIAL

## 2019-07-16 VITALS — HEIGHT: 64 IN | WEIGHT: 130.07 LBS | BODY MASS INDEX: 22.21 KG/M2

## 2019-07-16 DIAGNOSIS — M19.212 SECONDARY OSTEOARTHRITIS OF LEFT SHOULDER DUE TO ROTATOR CUFF ARTHROPATHY: ICD-10-CM

## 2019-07-16 DIAGNOSIS — M70.62 TROCHANTERIC BURSITIS OF LEFT HIP: Primary | ICD-10-CM

## 2019-07-16 PROCEDURE — 1090F PRES/ABSN URINE INCON ASSESS: CPT | Performed by: ORTHOPAEDIC SURGERY

## 2019-07-16 PROCEDURE — G8427 DOCREV CUR MEDS BY ELIG CLIN: HCPCS | Performed by: ORTHOPAEDIC SURGERY

## 2019-07-16 PROCEDURE — 1123F ACP DISCUSS/DSCN MKR DOCD: CPT | Performed by: ORTHOPAEDIC SURGERY

## 2019-07-16 PROCEDURE — G8420 CALC BMI NORM PARAMETERS: HCPCS | Performed by: ORTHOPAEDIC SURGERY

## 2019-07-16 PROCEDURE — G8598 ASA/ANTIPLAT THER USED: HCPCS | Performed by: ORTHOPAEDIC SURGERY

## 2019-07-16 PROCEDURE — 1036F TOBACCO NON-USER: CPT | Performed by: ORTHOPAEDIC SURGERY

## 2019-07-16 PROCEDURE — 99213 OFFICE O/P EST LOW 20 MIN: CPT | Performed by: ORTHOPAEDIC SURGERY

## 2019-07-16 PROCEDURE — 4040F PNEUMOC VAC/ADMIN/RCVD: CPT | Performed by: ORTHOPAEDIC SURGERY

## 2019-07-16 NOTE — PROGRESS NOTES
Chief Complaint:  Follow-up (CK L SHOULDER AND L HIP )      SUBJECTIVE:  Albina Stafford is a 80 y.o. female who returns today for evaluation of left hip and left shoulder, she has been on long-standing patient of Dr. Mark Turk and has been under his care for workman's comp injury that occurred in , and his senior living she is being referred to me to continue treatment. Treatment to date includes Voltaren cream which seems to alleviate her symptoms. Patient is also well known to me as I treated her in the past for lower extremity weakness, and also treated her   for knee osteoarthritis. Today she has 0 out of 10 pain in the hip and shoulder she has significant limitations with using the left shoulder secondary to pseudoparalysis    Is unable to reach her arm behind her back, is able to reach her hand to her mouth but not the top of her head. Patient uses a cane to ambulate around the house, she does not have any assistive devices with her today. Pain Assessment:  Pain Assessment  Location of Pain: Shoulder  Location Modifiers: Left  Severity of Pain: 0      OBJECTIVE:  Vital Signs:  Vitals:    19 1310   Weight: 130 lb 1.1 oz (59 kg)   Height: 5' 4.02\" (1.626 m)       Appearance: alert, well appearing, and in no distress, oriented to person, place, and time and normal appearing weight. Physical exam:   Her physical exam remains unchanged, active forward flexion approximately 45 degrees, passive forward flexion 90 degrees, extension to her side pocket, is able to reach her mouth, is unable to reach the top of her head. Today she is able to hold her arm at 90 degrees of forward flexion against gravity. 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     Trochanteric bursitis of left hip Yes   

## 2019-09-12 ENCOUNTER — HOSPITAL ENCOUNTER (INPATIENT)
Age: 84
LOS: 1 days | Discharge: ANOTHER HOSPITAL | DRG: 884 | End: 2019-09-12
Attending: EMERGENCY MEDICINE | Admitting: PSYCHIATRY & NEUROLOGY
Payer: MEDICARE

## 2019-09-12 ENCOUNTER — APPOINTMENT (OUTPATIENT)
Dept: CT IMAGING | Age: 84
DRG: 884 | End: 2019-09-12
Payer: MEDICARE

## 2019-09-12 ENCOUNTER — HOSPITAL ENCOUNTER (EMERGENCY)
Age: 84
Discharge: HOME | DRG: 884 | End: 2019-09-13
Attending: PSYCHIATRY & NEUROLOGY | Admitting: PSYCHIATRY & NEUROLOGY
Payer: MEDICARE

## 2019-09-12 ENCOUNTER — APPOINTMENT (OUTPATIENT)
Dept: GENERAL RADIOLOGY | Age: 84
DRG: 884 | End: 2019-09-12
Payer: MEDICARE

## 2019-09-12 VITALS
RESPIRATION RATE: 15 BRPM | DIASTOLIC BLOOD PRESSURE: 69 MMHG | SYSTOLIC BLOOD PRESSURE: 168 MMHG | HEIGHT: 64 IN | BODY MASS INDEX: 22.2 KG/M2 | OXYGEN SATURATION: 100 % | HEART RATE: 57 BPM | TEMPERATURE: 98 F | WEIGHT: 130 LBS

## 2019-09-12 DIAGNOSIS — R44.3 HALLUCINATIONS: Primary | ICD-10-CM

## 2019-09-12 PROBLEM — F39 UNSPECIFIED MOOD (AFFECTIVE) DISORDER (HCC): Status: ACTIVE | Noted: 2019-09-12

## 2019-09-12 LAB
A/G RATIO: 1.1 (ref 1.1–2.2)
ACETAMINOPHEN LEVEL: <5 UG/ML (ref 10–30)
ALBUMIN SERPL-MCNC: 3.9 G/DL (ref 3.4–5)
ALP BLD-CCNC: 65 U/L (ref 40–129)
ALT SERPL-CCNC: 97 U/L (ref 10–40)
AMMONIA: 35 UMOL/L (ref 11–51)
AMPHETAMINE SCREEN, URINE: NORMAL
ANION GAP SERPL CALCULATED.3IONS-SCNC: 15 MMOL/L (ref 3–16)
AST SERPL-CCNC: 97 U/L (ref 15–37)
BARBITURATE SCREEN URINE: NORMAL
BASOPHILS ABSOLUTE: 0.1 K/UL (ref 0–0.2)
BASOPHILS RELATIVE PERCENT: 1.4 %
BENZODIAZEPINE SCREEN, URINE: NORMAL
BILIRUB SERPL-MCNC: 0.4 MG/DL (ref 0–1)
BILIRUBIN URINE: NEGATIVE
BLOOD, URINE: NEGATIVE
BUN BLDV-MCNC: 19 MG/DL (ref 7–20)
CALCIUM SERPL-MCNC: 9.5 MG/DL (ref 8.3–10.6)
CANNABINOID SCREEN URINE: NORMAL
CHLORIDE BLD-SCNC: 103 MMOL/L (ref 99–110)
CLARITY: CLEAR
CO2: 20 MMOL/L (ref 21–32)
COCAINE METABOLITE SCREEN URINE: NORMAL
COLOR: YELLOW
CREAT SERPL-MCNC: 0.7 MG/DL (ref 0.6–1.2)
EOSINOPHILS ABSOLUTE: 0.1 K/UL (ref 0–0.6)
EOSINOPHILS RELATIVE PERCENT: 1.7 %
ETHANOL: NORMAL MG/DL (ref 0–0.08)
GFR AFRICAN AMERICAN: >60
GFR NON-AFRICAN AMERICAN: >60
GLOBULIN: 3.4 G/DL
GLUCOSE BLD-MCNC: 172 MG/DL (ref 70–99)
GLUCOSE BLD-MCNC: 211 MG/DL (ref 70–99)
GLUCOSE BLD-MCNC: 336 MG/DL (ref 70–99)
GLUCOSE URINE: >=1000 MG/DL
HCT VFR BLD CALC: 39.2 % (ref 36–48)
HEMOGLOBIN: 13.4 G/DL (ref 12–16)
KETONES, URINE: NEGATIVE MG/DL
LACTIC ACID: 2.9 MMOL/L (ref 0.4–2)
LEUKOCYTE ESTERASE, URINE: NEGATIVE
LYMPHOCYTES ABSOLUTE: 1.5 K/UL (ref 1–5.1)
LYMPHOCYTES RELATIVE PERCENT: 28.5 %
Lab: NORMAL
MCH RBC QN AUTO: 33.9 PG (ref 26–34)
MCHC RBC AUTO-ENTMCNC: 34.1 G/DL (ref 31–36)
MCV RBC AUTO: 99.4 FL (ref 80–100)
METHADONE SCREEN, URINE: NORMAL
MICROSCOPIC EXAMINATION: ABNORMAL
MONOCYTES ABSOLUTE: 0.4 K/UL (ref 0–1.3)
MONOCYTES RELATIVE PERCENT: 8.4 %
NEUTROPHILS ABSOLUTE: 3.1 K/UL (ref 1.7–7.7)
NEUTROPHILS RELATIVE PERCENT: 60 %
NITRITE, URINE: NEGATIVE
OPIATE SCREEN URINE: NORMAL
OXYCODONE URINE: NORMAL
PDW BLD-RTO: 11.9 % (ref 12.4–15.4)
PERFORMED ON: ABNORMAL
PERFORMED ON: ABNORMAL
PH UA: 5.5
PH UA: 5.5 (ref 5–8)
PHENCYCLIDINE SCREEN URINE: NORMAL
PLATELET # BLD: 100 K/UL (ref 135–450)
PMV BLD AUTO: 10.5 FL (ref 5–10.5)
POTASSIUM SERPL-SCNC: 4.1 MMOL/L (ref 3.5–5.1)
PROPOXYPHENE SCREEN: NORMAL
PROTEIN UA: NEGATIVE MG/DL
RBC # BLD: 3.95 M/UL (ref 4–5.2)
SALICYLATE, SERUM: <0.3 MG/DL (ref 15–30)
SODIUM BLD-SCNC: 138 MMOL/L (ref 136–145)
SPECIFIC GRAVITY UA: 1.02 (ref 1–1.03)
TOTAL PROTEIN: 7.3 G/DL (ref 6.4–8.2)
TROPONIN: <0.01 NG/ML
URINE REFLEX TO CULTURE: ABNORMAL
URINE TYPE: ABNORMAL
UROBILINOGEN, URINE: 0.2 E.U./DL
WBC # BLD: 5.1 K/UL (ref 4–11)

## 2019-09-12 PROCEDURE — 36415 COLL VENOUS BLD VENIPUNCTURE: CPT

## 2019-09-12 PROCEDURE — 81003 URINALYSIS AUTO W/O SCOPE: CPT

## 2019-09-12 PROCEDURE — 80053 COMPREHEN METABOLIC PANEL: CPT

## 2019-09-12 PROCEDURE — 1240000000 HC EMOTIONAL WELLNESS R&B

## 2019-09-12 PROCEDURE — 84484 ASSAY OF TROPONIN QUANT: CPT

## 2019-09-12 PROCEDURE — G0480 DRUG TEST DEF 1-7 CLASSES: HCPCS

## 2019-09-12 PROCEDURE — 83605 ASSAY OF LACTIC ACID: CPT

## 2019-09-12 PROCEDURE — 87040 BLOOD CULTURE FOR BACTERIA: CPT

## 2019-09-12 PROCEDURE — 71046 X-RAY EXAM CHEST 2 VIEWS: CPT

## 2019-09-12 PROCEDURE — 2580000003 HC RX 258: Performed by: EMERGENCY MEDICINE

## 2019-09-12 PROCEDURE — 99285 EMERGENCY DEPT VISIT HI MDM: CPT

## 2019-09-12 PROCEDURE — 70450 CT HEAD/BRAIN W/O DYE: CPT

## 2019-09-12 PROCEDURE — 85025 COMPLETE CBC W/AUTO DIFF WBC: CPT

## 2019-09-12 PROCEDURE — 6370000000 HC RX 637 (ALT 250 FOR IP): Performed by: EMERGENCY MEDICINE

## 2019-09-12 PROCEDURE — 82140 ASSAY OF AMMONIA: CPT

## 2019-09-12 PROCEDURE — 80307 DRUG TEST PRSMV CHEM ANLYZR: CPT

## 2019-09-12 RX ORDER — 0.9 % SODIUM CHLORIDE 0.9 %
1000 INTRAVENOUS SOLUTION INTRAVENOUS ONCE
Status: COMPLETED | OUTPATIENT
Start: 2019-09-12 | End: 2019-09-12

## 2019-09-12 RX ORDER — LORAZEPAM 1 MG/1
1 TABLET ORAL ONCE
Status: COMPLETED | OUTPATIENT
Start: 2019-09-12 | End: 2019-09-12

## 2019-09-12 RX ADMIN — SODIUM CHLORIDE 1000 ML: 9 INJECTION, SOLUTION INTRAVENOUS at 12:06

## 2019-09-12 RX ADMIN — LORAZEPAM 1 MG: 1 TABLET ORAL at 13:46

## 2019-09-12 RX ADMIN — SODIUM CHLORIDE 1000 ML: 9 INJECTION, SOLUTION INTRAVENOUS at 13:22

## 2019-09-12 ASSESSMENT — SLEEP AND FATIGUE QUESTIONNAIRES
SLEEP PATTERN: DISTURBED/INTERRUPTED SLEEP;RESTLESSNESS;DIFFICULTY FALLING ASLEEP
RESTFUL SLEEP: NO
DO YOU HAVE DIFFICULTY SLEEPING: YES
AVERAGE NUMBER OF SLEEP HOURS: 6
DIFFICULTY STAYING ASLEEP: YES
DO YOU USE A SLEEP AID: YES
DIFFICULTY FALLING ASLEEP: YES
DIFFICULTY ARISING: NO

## 2019-09-12 ASSESSMENT — ENCOUNTER SYMPTOMS
COLOR CHANGE: 0
FACIAL SWELLING: 0
STRIDOR: 0
VOICE CHANGE: 0
NAUSEA: 0
TROUBLE SWALLOWING: 0
VOMITING: 0
ABDOMINAL PAIN: 0
WHEEZING: 0
SHORTNESS OF BREATH: 0

## 2019-09-12 ASSESSMENT — LIFESTYLE VARIABLES: HISTORY_ALCOHOL_USE: NO

## 2019-09-13 VITALS — HEART RATE: 84 BPM | SYSTOLIC BLOOD PRESSURE: 137 MMHG | DIASTOLIC BLOOD PRESSURE: 90 MMHG | RESPIRATION RATE: 16 BRPM

## 2019-09-13 PROBLEM — I25.10 CAD (CORONARY ARTERY DISEASE): Status: ACTIVE | Noted: 2019-09-13

## 2019-09-13 PROBLEM — E11.9 DIABETES MELLITUS (HCC): Status: ACTIVE | Noted: 2019-09-13

## 2019-09-13 PROBLEM — F03.90 MAJOR NEUROCOGNITIVE DISORDER (HCC): Status: ACTIVE | Noted: 2019-09-12

## 2019-09-13 PROBLEM — R44.1 VISUAL HALLUCINATIONS: Status: ACTIVE | Noted: 2019-09-13

## 2019-09-13 PROBLEM — R74.01 TRANSAMINITIS: Status: ACTIVE | Noted: 2019-09-13

## 2019-09-13 LAB — LACTIC ACID: 2.5 MMOL/L (ref 0.4–2)

## 2019-09-13 PROCEDURE — 5130000000 HC BRIDGE APPOINTMENT

## 2019-09-13 PROCEDURE — 36415 COLL VENOUS BLD VENIPUNCTURE: CPT

## 2019-09-13 PROCEDURE — 6370000000 HC RX 637 (ALT 250 FOR IP): Performed by: PSYCHIATRY & NEUROLOGY

## 2019-09-13 PROCEDURE — 97530 THERAPEUTIC ACTIVITIES: CPT

## 2019-09-13 PROCEDURE — 97165 OT EVAL LOW COMPLEX 30 MIN: CPT

## 2019-09-13 PROCEDURE — 83605 ASSAY OF LACTIC ACID: CPT

## 2019-09-13 PROCEDURE — 99223 1ST HOSP IP/OBS HIGH 75: CPT | Performed by: PSYCHIATRY & NEUROLOGY

## 2019-09-13 RX ORDER — MAGNESIUM HYDROXIDE/ALUMINUM HYDROXICE/SIMETHICONE 120; 1200; 1200 MG/30ML; MG/30ML; MG/30ML
30 SUSPENSION ORAL EVERY 6 HOURS PRN
Status: DISCONTINUED | OUTPATIENT
Start: 2019-09-13 | End: 2019-09-13 | Stop reason: HOSPADM

## 2019-09-13 RX ORDER — LORAZEPAM 2 MG/ML
1 INJECTION INTRAMUSCULAR EVERY 6 HOURS PRN
Status: DISCONTINUED | OUTPATIENT
Start: 2019-09-13 | End: 2019-09-13 | Stop reason: HOSPADM

## 2019-09-13 RX ORDER — FENOFIBRATE 160 MG/1
160 TABLET ORAL DAILY
Status: DISCONTINUED | OUTPATIENT
Start: 2019-09-13 | End: 2019-09-13

## 2019-09-13 RX ORDER — FENOFIBRATE 145 MG/1
145 TABLET, COATED ORAL DAILY
Status: DISCONTINUED | OUTPATIENT
Start: 2019-09-13 | End: 2019-09-13 | Stop reason: HOSPADM

## 2019-09-13 RX ORDER — DEXTROSE MONOHYDRATE 50 MG/ML
100 INJECTION, SOLUTION INTRAVENOUS PRN
Status: DISCONTINUED | OUTPATIENT
Start: 2019-09-13 | End: 2019-09-13 | Stop reason: HOSPADM

## 2019-09-13 RX ORDER — CITALOPRAM 20 MG/1
10 TABLET ORAL DAILY
Status: DISCONTINUED | OUTPATIENT
Start: 2019-09-13 | End: 2019-09-13

## 2019-09-13 RX ORDER — DULOXETIN HYDROCHLORIDE 20 MG/1
20 CAPSULE, DELAYED RELEASE ORAL DAILY
Status: DISCONTINUED | OUTPATIENT
Start: 2019-09-13 | End: 2019-09-13

## 2019-09-13 RX ORDER — GLIMEPIRIDE 2 MG/1
4 TABLET ORAL DAILY PRN
Status: DISCONTINUED | OUTPATIENT
Start: 2019-09-13 | End: 2019-09-13 | Stop reason: HOSPADM

## 2019-09-13 RX ORDER — ATORVASTATIN CALCIUM 10 MG/1
20 TABLET, FILM COATED ORAL NIGHTLY
Status: DISCONTINUED | OUTPATIENT
Start: 2019-09-13 | End: 2019-09-13 | Stop reason: HOSPADM

## 2019-09-13 RX ORDER — PANTOPRAZOLE SODIUM 40 MG/1
40 TABLET, DELAYED RELEASE ORAL
Status: DISCONTINUED | OUTPATIENT
Start: 2019-09-13 | End: 2019-09-13 | Stop reason: HOSPADM

## 2019-09-13 RX ORDER — TRAZODONE HYDROCHLORIDE 50 MG/1
25 TABLET ORAL NIGHTLY PRN
Status: DISCONTINUED | OUTPATIENT
Start: 2019-09-13 | End: 2019-09-13 | Stop reason: HOSPADM

## 2019-09-13 RX ORDER — DEXTROSE MONOHYDRATE 25 G/50ML
12.5 INJECTION, SOLUTION INTRAVENOUS PRN
Status: DISCONTINUED | OUTPATIENT
Start: 2019-09-13 | End: 2019-09-13 | Stop reason: HOSPADM

## 2019-09-13 RX ORDER — LORAZEPAM 0.5 MG/1
0.5 TABLET ORAL 2 TIMES DAILY
Status: DISCONTINUED | OUTPATIENT
Start: 2019-09-13 | End: 2019-09-13 | Stop reason: HOSPADM

## 2019-09-13 RX ORDER — ACETAMINOPHEN 325 MG/1
650 TABLET ORAL EVERY 4 HOURS PRN
Status: DISCONTINUED | OUTPATIENT
Start: 2019-09-13 | End: 2019-09-13 | Stop reason: HOSPADM

## 2019-09-13 RX ORDER — M-VIT,TX,IRON,MINS/CALC/FOLIC 27MG-0.4MG
1 TABLET ORAL DAILY
Status: DISCONTINUED | OUTPATIENT
Start: 2019-09-13 | End: 2019-09-13 | Stop reason: HOSPADM

## 2019-09-13 RX ORDER — ASPIRIN 81 MG/1
81 TABLET, CHEWABLE ORAL DAILY
Status: DISCONTINUED | OUTPATIENT
Start: 2019-09-13 | End: 2019-09-13 | Stop reason: HOSPADM

## 2019-09-13 RX ORDER — BENZTROPINE MESYLATE 1 MG/ML
1 INJECTION INTRAMUSCULAR; INTRAVENOUS 2 TIMES DAILY PRN
Status: DISCONTINUED | OUTPATIENT
Start: 2019-09-13 | End: 2019-09-13 | Stop reason: HOSPADM

## 2019-09-13 RX ORDER — HALOPERIDOL 1 MG/1
2 TABLET ORAL EVERY 6 HOURS PRN
Status: DISCONTINUED | OUTPATIENT
Start: 2019-09-13 | End: 2019-09-13 | Stop reason: HOSPADM

## 2019-09-13 RX ORDER — HALOPERIDOL 5 MG/ML
2 INJECTION INTRAMUSCULAR EVERY 6 HOURS PRN
Status: DISCONTINUED | OUTPATIENT
Start: 2019-09-13 | End: 2019-09-13 | Stop reason: HOSPADM

## 2019-09-13 RX ORDER — NICOTINE POLACRILEX 4 MG
15 LOZENGE BUCCAL PRN
Status: DISCONTINUED | OUTPATIENT
Start: 2019-09-13 | End: 2019-09-13 | Stop reason: HOSPADM

## 2019-09-13 RX ORDER — CHOLECALCIFEROL (VITAMIN D3) 125 MCG
1000 CAPSULE ORAL DAILY
Status: DISCONTINUED | OUTPATIENT
Start: 2019-09-13 | End: 2019-09-13 | Stop reason: HOSPADM

## 2019-09-13 RX ORDER — LORAZEPAM 0.5 MG/1
0.5 TABLET ORAL EVERY 6 HOURS PRN
Status: DISCONTINUED | OUTPATIENT
Start: 2019-09-13 | End: 2019-09-13 | Stop reason: HOSPADM

## 2019-09-13 RX ADMIN — ASPIRIN 81 MG 81 MG: 81 TABLET ORAL at 09:16

## 2019-09-13 RX ADMIN — B-COMPLEX W/ C & FOLIC ACID TAB 1 TABLET: TAB at 09:16

## 2019-09-13 RX ADMIN — DICLOFENAC 4 G: 10 GEL TOPICAL at 09:15

## 2019-09-13 RX ADMIN — LORAZEPAM 0.5 MG: 0.5 TABLET ORAL at 09:16

## 2019-09-13 RX ADMIN — CITALOPRAM HYDROBROMIDE 10 MG: 20 TABLET ORAL at 09:15

## 2019-09-13 RX ADMIN — PANTOPRAZOLE SODIUM 40 MG: 40 TABLET, DELAYED RELEASE ORAL at 09:16

## 2019-09-13 RX ADMIN — MULTIPLE VITAMINS W/ MINERALS TAB 1 TABLET: TAB at 09:16

## 2019-09-13 RX ADMIN — FENOFIBRATE 145 MG: 145 TABLET ORAL at 09:16

## 2019-09-13 RX ADMIN — DICLOFENAC 4 G: 10 GEL TOPICAL at 13:57

## 2019-09-13 RX ADMIN — DULOXETINE 20 MG: 20 CAPSULE, DELAYED RELEASE ORAL at 09:16

## 2019-09-13 RX ADMIN — CYANOCOBALAMIN TAB 500 MCG 1000 MCG: 500 TAB at 09:16

## 2019-09-13 ASSESSMENT — SLEEP AND FATIGUE QUESTIONNAIRES
DIFFICULTY STAYING ASLEEP: YES
DO YOU USE A SLEEP AID: YES
SLEEP PATTERN: DIFFICULTY FALLING ASLEEP;DISTURBED/INTERRUPTED SLEEP
DO YOU HAVE DIFFICULTY SLEEPING: YES
DIFFICULTY FALLING ASLEEP: YES
DIFFICULTY ARISING: NO
AVERAGE NUMBER OF SLEEP HOURS: 7
RESTFUL SLEEP: NO

## 2019-09-13 ASSESSMENT — LIFESTYLE VARIABLES: HISTORY_ALCOHOL_USE: NO

## 2019-09-17 LAB
BLOOD CULTURE, ROUTINE: NORMAL
CULTURE, BLOOD 2: NORMAL

## 2019-10-04 ENCOUNTER — HOSPITAL ENCOUNTER (INPATIENT)
Age: 84
Discharge: PSYCHIATRIC OR SUBSTANCE USE FACILITY | DRG: 884 | End: 2019-10-04
Attending: PSYCHIATRY & NEUROLOGY | Admitting: PSYCHIATRY & NEUROLOGY
Payer: MEDICARE

## 2019-10-23 ENCOUNTER — HOSPITAL ENCOUNTER (OUTPATIENT)
Dept: OPERATING ROOM | Age: 84
Setting detail: OUTPATIENT SURGERY
Discharge: HOME | End: 2019-10-23
Payer: MEDICARE

## 2019-10-23 VITALS
RESPIRATION RATE: 14 BRPM | OXYGEN SATURATION: 99 % | DIASTOLIC BLOOD PRESSURE: 73 MMHG | SYSTOLIC BLOOD PRESSURE: 154 MMHG | HEART RATE: 76 BPM

## 2019-10-23 PROCEDURE — 2500000003 HC RX 250 WO HCPCS: Performed by: OPHTHALMOLOGY

## 2019-10-23 PROCEDURE — 6370000000 HC RX 637 (ALT 250 FOR IP): Performed by: OPHTHALMOLOGY

## 2019-10-23 PROCEDURE — 65855 TRABECULOPLASTY LASER SURG: CPT

## 2019-10-23 RX ORDER — APIXABAN 2.5 MG/1
2.5 TABLET, FILM COATED ORAL 2 TIMES DAILY
COMMUNITY
Start: 2019-10-21

## 2019-10-23 RX ORDER — PILOCARPINE HYDROCHLORIDE 20 MG/ML
1 SOLUTION/ DROPS OPHTHALMIC ONCE
Status: COMPLETED | OUTPATIENT
Start: 2019-10-23 | End: 2019-10-23

## 2019-10-23 RX ORDER — PROPARACAINE HYDROCHLORIDE 5 MG/ML
1 SOLUTION/ DROPS OPHTHALMIC ONCE
Status: COMPLETED | OUTPATIENT
Start: 2019-10-23 | End: 2019-10-23

## 2019-10-23 RX ADMIN — BROMFENAC SODIUM 1 DROP: 0.7 SOLUTION/ DROPS OPHTHALMIC at 13:49

## 2019-10-23 RX ADMIN — APRACLONIDINE HYDROCHLORIDE 1 DROP: 10 SOLUTION/ DROPS OPHTHALMIC at 13:49

## 2019-10-23 RX ADMIN — PROPARACAINE HYDROCHLORIDE 1 DROP: 5 SOLUTION/ DROPS OPHTHALMIC at 13:44

## 2019-10-23 RX ADMIN — PILOCARPINE HYDROCHLORIDE 1 DROP: 20 SOLUTION/ DROPS OPHTHALMIC at 13:02

## 2019-10-23 RX ADMIN — APRACLONIDINE HYDROCHLORIDE 1 DROP: 10 SOLUTION/ DROPS OPHTHALMIC at 13:00

## 2019-11-13 ENCOUNTER — HOSPITAL ENCOUNTER (OUTPATIENT)
Dept: OPERATING ROOM | Age: 84
Setting detail: OUTPATIENT SURGERY
Discharge: HOME | End: 2019-11-13
Payer: MEDICARE

## 2019-11-13 VITALS — HEART RATE: 63 BPM | SYSTOLIC BLOOD PRESSURE: 144 MMHG | DIASTOLIC BLOOD PRESSURE: 68 MMHG

## 2019-11-13 PROCEDURE — 65855 TRABECULOPLASTY LASER SURG: CPT

## 2019-11-13 PROCEDURE — 6370000000 HC RX 637 (ALT 250 FOR IP): Performed by: OPHTHALMOLOGY

## 2019-11-13 PROCEDURE — 2500000003 HC RX 250 WO HCPCS: Performed by: OPHTHALMOLOGY

## 2019-11-13 RX ORDER — PILOCARPINE HYDROCHLORIDE 20 MG/ML
1 SOLUTION/ DROPS OPHTHALMIC ONCE
Status: COMPLETED | OUTPATIENT
Start: 2019-11-13 | End: 2019-11-13

## 2019-11-13 RX ORDER — PROPARACAINE HYDROCHLORIDE 5 MG/ML
1 SOLUTION/ DROPS OPHTHALMIC ONCE
Status: COMPLETED | OUTPATIENT
Start: 2019-11-13 | End: 2019-11-13

## 2019-11-13 RX ADMIN — PROPARACAINE HYDROCHLORIDE 1 DROP: 5 SOLUTION/ DROPS OPHTHALMIC at 14:03

## 2019-11-13 RX ADMIN — APRACLONIDINE HYDROCHLORIDE 1 DROP: 10 SOLUTION/ DROPS OPHTHALMIC at 13:11

## 2019-11-13 RX ADMIN — PILOCARPINE HYDROCHLORIDE 1 DROP: 20 SOLUTION/ DROPS OPHTHALMIC at 13:09

## 2019-11-13 RX ADMIN — APRACLONIDINE HYDROCHLORIDE 1 DROP: 10 SOLUTION/ DROPS OPHTHALMIC at 14:09

## 2019-11-13 RX ADMIN — BROMFENAC SODIUM 1 DROP: 0.7 SOLUTION/ DROPS OPHTHALMIC at 14:10

## 2020-01-09 ENCOUNTER — TELEPHONE (OUTPATIENT)
Dept: ORTHOPEDIC SURGERY | Age: 85
End: 2020-01-09

## 2020-01-10 ENCOUNTER — TELEPHONE (OUTPATIENT)
Dept: ORTHOPEDIC SURGERY | Age: 85
End: 2020-01-10

## 2020-01-28 ENCOUNTER — OFFICE VISIT (OUTPATIENT)
Dept: ORTHOPEDIC SURGERY | Age: 85
End: 2020-01-28
Payer: COMMERCIAL

## 2020-01-28 VITALS — WEIGHT: 130.07 LBS | BODY MASS INDEX: 22.21 KG/M2 | HEIGHT: 64 IN

## 2020-01-28 PROBLEM — S46.012A TRAUMATIC INCOMPLETE TEAR OF LEFT ROTATOR CUFF: Status: ACTIVE | Noted: 2020-01-28

## 2020-01-28 PROCEDURE — 99213 OFFICE O/P EST LOW 20 MIN: CPT | Performed by: ORTHOPAEDIC SURGERY

## 2020-01-28 NOTE — PROGRESS NOTES
Chief Complaint:  Follow-up (CHECK LEFT SHOULDER/LEFT HIP)      SUBJECTIVE:  Ignacio Greer is a 80 y.o. female who returns today for evaluation of left hip and left shoulder, she has been on long-standing patient of Dr. Tyrell Costa and has been under his care for workman's comp injury that occurred in , and his jail she is being referred to me to continue treatment. Treatment to date includes Voltaren cream which seems to alleviate her symptoms. Patient is also well known to me as I treated her in the past for lower extremity weakness, and also treated her   for knee osteoarthritis. Today she has 7 out of 10 pain in the hip and shoulder she has significant limitations with using the left shoulder secondary to pseudoparalysis    Is unable to reach her arm behind her back, is able to reach her hand to her mouth but not the top of her head. Patient uses a cane to ambulate around the house, she does not have any assistive devices with her today. Pain Assessment:  Pain Assessment  Location of Pain: Shoulder  Location Modifiers: Left  Severity of Pain: 7  Quality of Pain: Aching  Duration of Pain: Persistent  Frequency of Pain: Constant  Aggravating Factors: Stretching, Straightening, Bending  Limiting Behavior: Yes  Relieving Factors: Rest, Ice, Nsaids(VOLTERAN CREAM)  Result of Injury: No  Work-Related Injury: No  Are there other pain locations you wish to document?: No      OBJECTIVE:  Vital Signs:  Vitals:    20 1311   Weight: 130 lb 1.1 oz (59 kg)   Height: 5' 4.02\" (1.626 m)       Appearance: alert, well appearing, and in no distress, oriented to person, place, and time and normal appearing weight. Physical exam:   Her physical exam remains unchanged, active forward flexion approximately 45 degrees, passive forward flexion 80 degrees, extension to her side pocket, is able to reach her mouth, is unable to reach the top of her head.    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  Trochanteric bursitis of left hip Yes    Traumatic incomplete tear of left rotator cuff, subsequent encounter        Office Procedures:  No orders of the defined types were placed in this encounter. No orders of the defined types were placed in this encounter. Treatment Plan:  continue with the conservative treatment , renew her Voltaren cream as needed. She'll follow up with me every 6 months for reevaluation. Patient agrees with this plan, all of their questions were answered best of our ability and to their satisfaction.       Paulette Erickson

## 2020-06-28 ENCOUNTER — APPOINTMENT (OUTPATIENT)
Dept: GENERAL RADIOLOGY | Age: 85
End: 2020-06-28
Payer: OTHER MISCELLANEOUS

## 2020-06-28 ENCOUNTER — APPOINTMENT (OUTPATIENT)
Dept: CT IMAGING | Age: 85
End: 2020-06-28
Payer: OTHER MISCELLANEOUS

## 2020-06-28 ENCOUNTER — HOSPITAL ENCOUNTER (EMERGENCY)
Age: 85
Discharge: HOME | End: 2020-06-28
Payer: OTHER MISCELLANEOUS

## 2020-06-28 VITALS
WEIGHT: 130 LBS | TEMPERATURE: 98.3 F | SYSTOLIC BLOOD PRESSURE: 132 MMHG | BODY MASS INDEX: 22.2 KG/M2 | OXYGEN SATURATION: 99 % | HEIGHT: 64 IN | HEART RATE: 78 BPM | RESPIRATION RATE: 20 BRPM | DIASTOLIC BLOOD PRESSURE: 78 MMHG

## 2020-06-28 PROCEDURE — 99284 EMERGENCY DEPT VISIT MOD MDM: CPT

## 2020-06-28 PROCEDURE — 72125 CT NECK SPINE W/O DYE: CPT

## 2020-06-28 PROCEDURE — 70450 CT HEAD/BRAIN W/O DYE: CPT

## 2020-06-28 PROCEDURE — 71045 X-RAY EXAM CHEST 1 VIEW: CPT

## 2020-06-28 ASSESSMENT — ENCOUNTER SYMPTOMS
ABDOMINAL PAIN: 0
SHORTNESS OF BREATH: 0
BACK PAIN: 0
VOMITING: 0

## 2020-06-28 ASSESSMENT — PAIN DESCRIPTION - DESCRIPTORS: DESCRIPTORS: CONSTANT;THROBBING

## 2020-06-28 ASSESSMENT — PAIN DESCRIPTION - LOCATION: LOCATION: HEAD

## 2020-06-28 ASSESSMENT — PAIN SCALES - GENERAL: PAINLEVEL_OUTOF10: 1
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
## 2020-07-28 ENCOUNTER — TELEPHONE (OUTPATIENT)
Dept: ORTHOPEDIC SURGERY | Age: 85
End: 2020-07-28

## 2020-07-28 NOTE — TELEPHONE ENCOUNTER
Called the patient and asked her why shemissed her appointment, patient forgot and stated she had various other issues going on and simply forgot.  Patient is rescheduled the appointment for next Tuesday Aug 4th at 1pm.

## 2020-08-04 ENCOUNTER — OFFICE VISIT (OUTPATIENT)
Dept: ORTHOPEDIC SURGERY | Age: 85
End: 2020-08-04
Payer: COMMERCIAL

## 2020-08-04 VITALS — BODY MASS INDEX: 22.2 KG/M2 | WEIGHT: 130 LBS | HEIGHT: 64 IN

## 2020-08-04 PROCEDURE — 99213 OFFICE O/P EST LOW 20 MIN: CPT | Performed by: ORTHOPAEDIC SURGERY
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
## 2020-11-06 ENCOUNTER — APPOINTMENT (OUTPATIENT)
Dept: CT IMAGING | Age: 85
DRG: 177 | End: 2020-11-06
Payer: MEDICARE

## 2020-11-06 ENCOUNTER — HOSPITAL ENCOUNTER (EMERGENCY)
Age: 85
Discharge: HOME | DRG: 177 | End: 2020-11-06
Attending: EMERGENCY MEDICINE
Payer: MEDICARE

## 2020-11-06 ENCOUNTER — APPOINTMENT (OUTPATIENT)
Dept: GENERAL RADIOLOGY | Age: 85
DRG: 177 | End: 2020-11-06
Payer: MEDICARE

## 2020-11-06 VITALS
TEMPERATURE: 99.1 F | BODY MASS INDEX: 30 KG/M2 | WEIGHT: 163 LBS | SYSTOLIC BLOOD PRESSURE: 123 MMHG | RESPIRATION RATE: 16 BRPM | HEIGHT: 62 IN | HEART RATE: 82 BPM | DIASTOLIC BLOOD PRESSURE: 76 MMHG | OXYGEN SATURATION: 95 %

## 2020-11-06 DIAGNOSIS — M54.50 ACUTE BILATERAL LOW BACK PAIN WITHOUT SCIATICA: Primary | ICD-10-CM

## 2020-11-06 LAB
A/G RATIO: 1.3 (ref 1.1–2.2)
ALBUMIN SERPL-MCNC: 4 G/DL (ref 3.4–5)
ALP BLD-CCNC: 58 U/L (ref 40–129)
ALT SERPL-CCNC: 137 U/L (ref 10–40)
ANION GAP SERPL CALCULATED.3IONS-SCNC: 11 MMOL/L (ref 3–16)
AST SERPL-CCNC: 137 U/L (ref 15–37)
BASOPHILS ABSOLUTE: 0 K/UL (ref 0–0.2)
BASOPHILS RELATIVE PERCENT: 1.1 %
BILIRUB SERPL-MCNC: 0.5 MG/DL (ref 0–1)
BILIRUBIN URINE: NEGATIVE
BLOOD, URINE: NEGATIVE
BUN BLDV-MCNC: 19 MG/DL (ref 7–20)
CALCIUM SERPL-MCNC: 9.3 MG/DL (ref 8.3–10.6)
CHLORIDE BLD-SCNC: 104 MMOL/L (ref 99–110)
CLARITY: CLEAR
CO2: 22 MMOL/L (ref 21–32)
COLOR: YELLOW
CREAT SERPL-MCNC: 0.7 MG/DL (ref 0.6–1.2)
EKG ATRIAL RATE: 90 BPM
EKG DIAGNOSIS: NORMAL
EKG P AXIS: 90 DEGREES
EKG P-R INTERVAL: 198 MS
EKG Q-T INTERVAL: 398 MS
EKG QRS DURATION: 126 MS
EKG QTC CALCULATION (BAZETT): 489 MS
EKG R AXIS: -9 DEGREES
EKG T AXIS: 129 DEGREES
EKG VENTRICULAR RATE: 91 BPM
EOSINOPHILS ABSOLUTE: 0 K/UL (ref 0–0.6)
EOSINOPHILS RELATIVE PERCENT: 0.5 %
GFR AFRICAN AMERICAN: >60
GFR NON-AFRICAN AMERICAN: >60
GLOBULIN: 3 G/DL
GLUCOSE BLD-MCNC: 141 MG/DL (ref 70–99)
GLUCOSE URINE: NEGATIVE MG/DL
HCT VFR BLD CALC: 42.3 % (ref 36–48)
HEMOGLOBIN: 14.5 G/DL (ref 12–16)
INR BLD: 1.55 (ref 0.86–1.14)
KETONES, URINE: NEGATIVE MG/DL
LACTIC ACID: 2.2 MMOL/L (ref 0.4–2)
LEUKOCYTE ESTERASE, URINE: NEGATIVE
LIPASE: 45 U/L (ref 13–60)
LYMPHOCYTES ABSOLUTE: 1.6 K/UL (ref 1–5.1)
LYMPHOCYTES RELATIVE PERCENT: 34.4 %
MAGNESIUM: 1.4 MG/DL (ref 1.8–2.4)
MCH RBC QN AUTO: 34.2 PG (ref 26–34)
MCHC RBC AUTO-ENTMCNC: 34.4 G/DL (ref 31–36)
MCV RBC AUTO: 99.4 FL (ref 80–100)
MICROSCOPIC EXAMINATION: NORMAL
MONOCYTES ABSOLUTE: 0.4 K/UL (ref 0–1.3)
MONOCYTES RELATIVE PERCENT: 8.5 %
NEUTROPHILS ABSOLUTE: 2.6 K/UL (ref 1.7–7.7)
NEUTROPHILS RELATIVE PERCENT: 55.5 %
NITRITE, URINE: NEGATIVE
PDW BLD-RTO: 12.2 % (ref 12.4–15.4)
PH UA: 5 (ref 5–8)
PLATELET # BLD: 74 K/UL (ref 135–450)
PLATELET SLIDE REVIEW: ABNORMAL
PMV BLD AUTO: 11.2 FL (ref 5–10.5)
POTASSIUM SERPL-SCNC: 4.3 MMOL/L (ref 3.5–5.1)
PRO-BNP: 177 PG/ML (ref 0–449)
PROTEIN UA: NEGATIVE MG/DL
PROTHROMBIN TIME: 17.7 SEC (ref 10–13.2)
RBC # BLD: 4.25 M/UL (ref 4–5.2)
SODIUM BLD-SCNC: 137 MMOL/L (ref 136–145)
SPECIFIC GRAVITY UA: >=1.03 (ref 1–1.03)
TOTAL PROTEIN: 7 G/DL (ref 6.4–8.2)
TROPONIN: <0.01 NG/ML
URIC ACID, SERUM: 6.4 MG/DL (ref 2.6–6)
URINE TYPE: NORMAL
UROBILINOGEN, URINE: 0.2 E.U./DL
WBC # BLD: 4.7 K/UL (ref 4–11)

## 2020-11-06 PROCEDURE — 84550 ASSAY OF BLOOD/URIC ACID: CPT

## 2020-11-06 PROCEDURE — 85025 COMPLETE CBC W/AUTO DIFF WBC: CPT

## 2020-11-06 PROCEDURE — 85610 PROTHROMBIN TIME: CPT

## 2020-11-06 PROCEDURE — 99285 EMERGENCY DEPT VISIT HI MDM: CPT

## 2020-11-06 PROCEDURE — 93005 ELECTROCARDIOGRAM TRACING: CPT | Performed by: EMERGENCY MEDICINE

## 2020-11-06 PROCEDURE — 84484 ASSAY OF TROPONIN QUANT: CPT

## 2020-11-06 PROCEDURE — 71046 X-RAY EXAM CHEST 2 VIEWS: CPT

## 2020-11-06 PROCEDURE — 83880 ASSAY OF NATRIURETIC PEPTIDE: CPT

## 2020-11-06 PROCEDURE — 6370000000 HC RX 637 (ALT 250 FOR IP)

## 2020-11-06 PROCEDURE — 71260 CT THORAX DX C+: CPT

## 2020-11-06 PROCEDURE — 83735 ASSAY OF MAGNESIUM: CPT

## 2020-11-06 PROCEDURE — 81003 URINALYSIS AUTO W/O SCOPE: CPT

## 2020-11-06 PROCEDURE — 83605 ASSAY OF LACTIC ACID: CPT

## 2020-11-06 PROCEDURE — 36415 COLL VENOUS BLD VENIPUNCTURE: CPT

## 2020-11-06 PROCEDURE — 74177 CT ABD & PELVIS W/CONTRAST: CPT

## 2020-11-06 PROCEDURE — 83690 ASSAY OF LIPASE: CPT

## 2020-11-06 PROCEDURE — 6360000004 HC RX CONTRAST MEDICATION: Performed by: EMERGENCY MEDICINE

## 2020-11-06 PROCEDURE — 80053 COMPREHEN METABOLIC PANEL: CPT

## 2020-11-06 PROCEDURE — 93010 ELECTROCARDIOGRAM REPORT: CPT | Performed by: INTERNAL MEDICINE

## 2020-11-06 PROCEDURE — 2580000003 HC RX 258: Performed by: EMERGENCY MEDICINE

## 2020-11-06 RX ORDER — LORAZEPAM 1 MG/1
TABLET ORAL
Status: COMPLETED
Start: 2020-11-06 | End: 2020-11-06

## 2020-11-06 RX ORDER — SODIUM CHLORIDE 9 MG/ML
INJECTION, SOLUTION INTRAVENOUS CONTINUOUS
Status: DISCONTINUED | OUTPATIENT
Start: 2020-11-06 | End: 2020-11-06 | Stop reason: HOSPADM

## 2020-11-06 RX ORDER — LORAZEPAM 1 MG/1
0.5 TABLET ORAL ONCE
Status: COMPLETED | OUTPATIENT
Start: 2020-11-06 | End: 2020-11-06

## 2020-11-06 RX ADMIN — LORAZEPAM 0.5 MG: 1 TABLET ORAL at 18:29

## 2020-11-06 RX ADMIN — IOPAMIDOL 75 ML: 755 INJECTION, SOLUTION INTRAVENOUS at 16:53

## 2020-11-06 RX ADMIN — SODIUM CHLORIDE: 9 INJECTION, SOLUTION INTRAVENOUS at 16:01

## 2020-11-06 ASSESSMENT — PAIN SCALES - GENERAL
PAINLEVEL_OUTOF10: 3
PAINLEVEL_OUTOF10: 7
PAINLEVEL_OUTOF10: 6
PAINLEVEL_OUTOF10: 0

## 2020-11-06 ASSESSMENT — PAIN DESCRIPTION - PAIN TYPE
TYPE: ACUTE PAIN

## 2020-11-06 ASSESSMENT — ENCOUNTER SYMPTOMS
COUGH: 0
ABDOMINAL PAIN: 0
STRIDOR: 0
WHEEZING: 0
ABDOMINAL DISTENTION: 0
SHORTNESS OF BREATH: 0
CHEST TIGHTNESS: 1
BACK PAIN: 1

## 2020-11-06 ASSESSMENT — PAIN DESCRIPTION - DESCRIPTORS: DESCRIPTORS: BURNING;SORE

## 2020-11-06 ASSESSMENT — PAIN DESCRIPTION - LOCATION: LOCATION: BACK

## 2020-11-06 ASSESSMENT — PAIN DESCRIPTION - FREQUENCY
FREQUENCY: INTERMITTENT

## 2020-11-06 NOTE — ED TRIAGE NOTES
Presents with c/o middle back pain and is unable to find position of comfort. Sent by PCP office for having blood in urine. Pt denies nausea or vomiting. Denies fever or chills. Denies urinary difficulty.

## 2020-11-06 NOTE — ED PROVIDER NOTES
[facility]      Pt Name: Mendy Correia  MRN: [id number]  [place] 1/7/1930  Date of evaluation: 11/6/2020  Provider: Michael Garner MD    [address] [place] [address]       Chief Complaint   Patient presents with    Back Pain         HISTORY OF PRESENT ILLNESS   (Location/Symptom, Timing/Onset, Context/Setting, Quality, Duration, Modifying Factors, Severity)  Note limiting factors. Mendy Correia is a 80 y.o. female who presents to the emergency department     Patient presents with her daughter complaining of a tightness across her condyle lower thoracic upper lumbar area  Apparently started a couple days ago is just been getting worse. She was sent in by the primary care physician after they evaluated I think they were concerned about the acute vascular emergency or possibly some sort of other acute process and she is 80years old she is very alert however talkative patient had no nausea no vomiting no cough no hemoptysis no leg swelling no dysuria frequency. Patient really voices no other complaints  Patient at this point we did check simply a urine first chest x-ray both of those came back normal so we did then proceed with CTA's of the chest and abdomen to rule out pulmonary embolism, thoracic dissection, thoracic aneurysm, aortic abdominal aortic aneurysm kidney problems etc.    The history is provided by the patient and a relative. Nursing Notes were reviewed. REVIEW OF SYSTEMS    (2-9 systems for level 4, 10 or more for level 5)     Review of Systems   Constitutional: Positive for activity change. Negative for fatigue. HENT: Negative for congestion and ear pain. Eyes: Negative for visual disturbance. Respiratory: Positive for chest tightness. Negative for cough, shortness of breath, wheezing and stridor. Cardiovascular: Negative for chest pain and leg swelling. Gastrointestinal: Negative for abdominal distention and abdominal pain. Genitourinary: Negative for decreased urine volume, difficulty urinating, pelvic pain and urgency. Musculoskeletal: Positive for back pain. Negative for arthralgias, gait problem and joint swelling. Allergic/Immunologic: Negative for immunocompromised state. Neurological: Negative for dizziness. All other systems reviewed and are negative. Except as noted above the remainder of the review of systems was reviewed and negative. 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 7/16/2019    TB (pulmonary tuberculosis) 2010         SURGICAL HISTORY       Past Surgical History:   Procedure Laterality Date    CARDIAC CATHETERIZATION      x 4 all normal    CHOLECYSTECTOMY, LAPAROSCOPIC N/A 5/1/13    LIVER BIOPSY; EXCISION OF NECROTIC LYMP NODE OF CROQUET; UMBILICAL HERNIA REPAIR    LUNG SURGERY      partial right lower lobectomy    SHOULDER SURGERY      THROAT SURGERY      vocal cord polyp         CURRENT MEDICATIONS       Previous Medications    ATORVASTATIN (LIPITOR) 20 MG TABLET    Take 1 tablet by mouth nightly    B COMPLEX VITAMINS (B COMPLEX 100 PO)    Take by mouth    DICLOFENAC SODIUM (VOLTAREN) 1 % GEL    APPLY 4 G TOPICALLY 4 TIMES DAILY    ELIQUIS 2.5 MG TABS TABLET        ESOMEPRAZOLE MAGNESIUM (NEXIUM) 20 MG PACK    Take 20 mg by mouth daily    FENOFIBRATE (TRICOR) 145 MG TABLET    fenofibrate nanocrystallized 145 mg tablet    GLIMEPIRIDE (AMARYL) 4 MG TABLET    Take 4 mg by mouth daily as needed Take before breakfast for blood sugar greater than 200.     INFLUENZA VAC SPLIT HIGH-DOSE (FLUZONE HIGH-DOSE) 0.5 ML ROSIBEL INJECTION    Fluzone High-Dose [id number] (PF) 180 mcg/0.5 mL intramuscular syringe   TO BE ADMINISTERED BY PHARMACIST FOR IMMUNIZATION    KLOR-CON 10 10 MEQ TABLET        LORAZEPAM (ATIVAN) 0.5 MG TABLET    Take 0.5 mg by mouth 2 times daily. Gale Smith MAG-G 500 (27 MG) MG TABS TABLET        MULTIPLE VITAMINS-MINERALS (THERAPEUTIC MULTIVITAMIN-MINERALS) TABLET    Take 1 tablet by mouth daily    TRUETEST TEST STRIP        VITAMIN B-12 (CYANOCOBALAMIN) 1000 MCG TABLET    Take 1,000 mcg by mouth daily. ALLERGIES     Other; Penicillins; Polysporin [bacitracin-polymyxin b]; and Adhesive tape    FAMILY HISTORY     History reviewed. No pertinent family history. SOCIAL HISTORY       Social History     Socioeconomic History    Marital status:       Spouse name: None    Number of children: None    Years of education: None    Highest education level: None   Occupational History    Occupation: retired    Social Needs    Financial resource strain: None    Food insecurity     Worry: None     Inability: None    Transportation needs     Medical: None     Non-medical: None   Tobacco Use    Smoking status: Never Smoker    Smokeless tobacco: Never Used   Substance and Sexual Activity    Alcohol use: No     Alcohol/week: 0.0 standard drinks    Drug use: No    Sexual activity: Not Currently     Partners: Male   Lifestyle    Physical activity     Days per week: None     Minutes per session: None    Stress: None   Relationships    Social connections     Talks on phone: None     Gets together: None     Attends Bahai service: None     Active member of club or organization: None     Attends meetings of clubs or organizations: None     Relationship status: None    Intimate partner violence     Fear of current or ex partner: None     Emotionally abused: None     Physically abused: None     Forced sexual activity: None   Other Topics Concern    None   Social History Narrative    None       SCREENINGS    [place] Coma Scale  Eye Opening: Spontaneous  Best Verbal Response: Oriented  Best Motor Response: Obeys commands  [place] Coma Scale Score: 15          PHYSICAL EXAM    (up to 7 for level 4, 8 or more for level 5)     ED Triage Vitals [11/06/20 1433]   BP Temp Temp Source Pulse Resp SpO2 Height Weight   (!) 152/67 99.1 °F (37.3 °C) Oral 82 16 100 % 5' 2\" (1.575 m) 163 lb (73.9 kg)       Physical Exam  Vitals signs and nursing note reviewed. Constitutional:       General: She is not in acute distress. Appearance: Normal appearance. She is well-developed. She is not ill-appearing, toxic-appearing or diaphoretic. HENT:      Head: Normocephalic. Right Ear: Tympanic membrane, ear canal and external ear normal.      Left Ear: Tympanic membrane, ear canal and external ear normal.      Nose: Nose normal.      Mouth/Throat:      Mouth: Mucous membranes are moist.   Eyes:      Extraocular Movements: Extraocular movements intact. Conjunctiva/sclera: Conjunctivae normal.      Pupils: Pupils are equal, round, and reactive to light. Neck:      Musculoskeletal: Normal range of motion and neck supple. No neck rigidity or muscular tenderness. Thyroid: No thyromegaly. Vascular: No carotid bruit. Cardiovascular:      Rate and Rhythm: Normal rate and regular rhythm. Pulses: Normal pulses. Heart sounds: Normal heart sounds. No murmur. No friction rub. No gallop. Pulmonary:      Effort: Pulmonary effort is normal. No respiratory distress. Breath sounds: Normal breath sounds. No stridor. No rhonchi. Abdominal:      General: Abdomen is flat. Bowel sounds are normal. There is no distension. Palpations: Abdomen is soft. Tenderness: There is no abdominal tenderness. Musculoskeletal: Normal range of motion. General: Tenderness present. Lymphadenopathy:      Cervical: No cervical adenopathy. Skin:     General: Skin is warm. Capillary Refill: Capillary refill takes less than 2 seconds.    Neurological:      General: No focal deficit present. Mental Status: She is alert and oriented to person, place, and time. GCS: GCS eye subscore is 4. GCS verbal subscore is 5. GCS motor subscore is 6. Cranial Nerves: No cranial nerve deficit. Sensory: No sensory deficit. Motor: No abnormal muscle tone. Coordination: Coordination normal.      Deep Tendon Reflexes: Reflexes normal.   Psychiatric:         Mood and Affect: Mood normal.         Behavior: Behavior normal.         Thought Content: Thought content normal.         DIAGNOSTIC RESULTS     EKG: All EKG's are interpreted by the Emergency Department Physician who either signs or Co-signs this chart in the absence of a cardiologist.  This patient rate is 91  Rhythm sinus  Left bundle branch pattern which is old  No acute ischii ST-T wave changes      RADIOLOGY:   Non-plain film images such as CT, Ultrasound and MRI are read by the radiologist. Plain radiographic images are visualized and preliminarily interpreted by the emergency physician with the below findings:        Interpretation per the Radiologist below, if available at the time of this note:    CT ABDOMEN PELVIS W IV CONTRAST Additional Contrast? IV   Final Result   1. No evidence for acute pulmonary embolism. Chronic findings in the chest   including sequela of old granulomatous disease. Old bilateral rib fractures. 2.  No acute abnormality identified in the abdomen or pelvis. CT CHEST PULMONARY EMBOLISM W CONTRAST   Final Result   1. No evidence for acute pulmonary embolism. Chronic findings in the chest   including sequela of old granulomatous disease. Old bilateral rib fractures. 2.  No acute abnormality identified in the abdomen or pelvis. XR CHEST (2 VW)   Final Result   No acute findings. No change.                  LABS:  Results for orders placed or performed during the hospital encounter of 11/06/20   CBC Auto Differential   Result Value Ref Range    WBC 4.7 4.0 - 11.0 K/uL RBC 4.25 4.00 - 5.20 M/uL    Hemoglobin 14.5 12.0 - 16.0 g/dL    Hematocrit 42.3 36.0 - 48.0 %    MCV 99.4 80.0 - 100.0 fL    MCH 34.2 (H) 26.0 - 34.0 pg    MCHC 34.4 31.0 - 36.0 g/dL    RDW 12.2 (L) 12.4 - 15.4 %    Platelets 74 (L) 997 - 450 K/uL    MPV 11.2 (H) 5.0 - 10.5 fL    PLATELET SLIDE REVIEW Decreased     Neutrophils % 55.5 %    Lymphocytes % 34.4 %    Monocytes % 8.5 %    Eosinophils % 0.5 %    Basophils % 1.1 %    Neutrophils Absolute 2.6 1.7 - 7.7 K/uL    Lymphocytes Absolute 1.6 1.0 - 5.1 K/uL    Monocytes Absolute 0.4 0.0 - 1.3 K/uL    Eosinophils Absolute 0.0 0.0 - 0.6 K/uL    Basophils Absolute 0.0 0.0 - 0.2 K/uL   Comprehensive Metabolic Panel   Result Value Ref Range    Sodium 137 136 - 145 mmol/L    Potassium 4.3 3.5 - 5.1 mmol/L    Chloride 104 99 - 110 mmol/L    CO2 22 21 - 32 mmol/L    Anion Gap 11 3 - 16    Glucose 141 (H) 70 - 99 mg/dL    BUN 19 7 - 20 mg/dL    CREATININE 0.7 0.6 - 1.2 mg/dL    GFR Non-African American >60 >60    GFR African American >60 >60    Calcium 9.3 8.3 - 10.6 mg/dL    Total Protein 7.0 6.4 - 8.2 g/dL    Alb 4.0 3.4 - 5.0 g/dL    Albumin/Globulin Ratio 1.3 1.1 - 2.2    Total Bilirubin 0.5 0.0 - 1.0 mg/dL    Alkaline Phosphatase 58 40 - 129 U/L     (H) 10 - 40 U/L     (H) 15 - 37 U/L    Globulin 3.0 g/dL   Protime-INR   Result Value Ref Range    Protime 17.7 (H) 10.0 - 13.2 sec    INR 1.55 (H) 0.86 - 1.14   Troponin   Result Value Ref Range    Troponin <0.01 <0.01 ng/mL   Brain Natriuretic Peptide   Result Value Ref Range    Pro- 0 - 449 pg/mL   Lipase   Result Value Ref Range    Lipase 45.0 13.0 - 60.0 U/L   Lactic Acid, Plasma   Result Value Ref Range    Lactic Acid 2.2 (H) 0.4 - 2.0 mmol/L   Urinalysis   Result Value Ref Range    Color, UA Yellow Straw/Yellow    Clarity, UA Clear Clear    Glucose, Ur Negative Negative mg/dL    Bilirubin Urine Negative Negative    Ketones, Urine Negative Negative mg/dL    Specific Gravity, UA >=1.030 1.005 - 1. 030    Blood, Urine Negative Negative    pH, UA 5.0 5.0 - 8.0    Protein, UA Negative Negative mg/dL    Urobilinogen, Urine 0.2 <2.0 E.U./dL    Nitrite, Urine Negative Negative    Leukocyte Esterase, Urine Negative Negative    Microscopic Examination Not Indicated     Urine Type NotGiven    Uric Acid   Result Value Ref Range    Uric Acid, Serum 6.4 (H) 2.6 - 6.0 mg/dL   Magnesium   Result Value Ref Range    Magnesium 1.40 (L) 1.80 - 2.40 mg/dL   EKG 12 Lead   Result Value Ref Range    Ventricular Rate 91 BPM    Atrial Rate 90 BPM    P-R Interval 198 ms    QRS Duration 126 ms    Q-T Interval 398 ms    QTc Calculation (Bazett) 489 ms    P Axis 90 degrees    R Axis -9 degrees    T Axis 129 degrees    Diagnosis       Normal sinus rhythmPremature atrial complexesLeft bundle branch blockAbnormal ECGWhen compared with ECG of7.5.18 QRS duration is lengthenedConfirmed by BRIDGET LEE MD ([id number]) on 11/6/2020 6:29:01 PM            EMERGENCY DEPARTMENT COURSE and DIFFERENTIAL DIAGNOSIS/MDM:     Vitals:    11/06/20 1433 11/06/20 1530 11/06/20 1630 11/06/20 1815   BP: (!) 152/67 (!) 148/68 (!) 145/53 112/71   Pulse: 82 86 81 81   Resp: 16 16 16 16   Temp: 99.1 °F (37.3 °C)      TempSrc: Oral      SpO2: 100% 100% 100%    Weight: 163 lb (73.9 kg)      Height: 5' 2\" (1.575 m)              MDM      REASSESSMENT          CRITICAL CARE TIME     CONSULTS:  None      PROCEDURES:     Procedures    MEDICATIONS GIVEN THIS VISIT:  Medications   0.9 % sodium chloride infusion ( Intravenous Stopped 11/6/20 1829)   iopamidol (ISOVUE-370) 76 % injection 75 mL (75 mLs Intravenous Given 11/6/20 1653)   LORazepam (ATIVAN) tablet 0.5 mg (0.5 mg Oral Given 11/6/20 1829)        FINAL IMPRESSION      1.  Acute bilateral low back pain without sciatica            DISPOSITION/PLAN   DISPOSITION Decision To Discharge 11/06/2020 07:05:14 PM      PATIENT REFERRED TO:  Julian Gallo PA-C  [address] [place] [address]  [phone]    In 1 week  As needed      DISCHARGE MEDICATIONS:  New Prescriptions    No medications on file       Controlled Substances Monitoring  No flowsheet data found. (Please note that portions of this note were completed with a voice recognition program.  Efforts were made to edit the dictations but occasionally words are mis-transcribed.)    Patient was advised to return to the Emergency Department if there was any worsening.     Sasha Marquez MD (electronically signed)  Attending Emergency Physician         David Lopez MD  11/06/20 0995

## 2020-11-06 NOTE — ED NOTES
Sterile straight cath obtained and sent. Noted that pt does have minimal uterine prolapse.        Duane Chimes, RN  11/06/20 9805

## 2020-11-09 ENCOUNTER — APPOINTMENT (OUTPATIENT)
Dept: GENERAL RADIOLOGY | Age: 85
DRG: 177 | End: 2020-11-09
Payer: MEDICARE

## 2020-11-09 ENCOUNTER — HOSPITAL ENCOUNTER (INPATIENT)
Age: 85
LOS: 2 days | Discharge: HOSPICE | DRG: 177 | End: 2020-11-11
Attending: EMERGENCY MEDICINE | Admitting: INTERNAL MEDICINE
Payer: MEDICARE

## 2020-11-09 DIAGNOSIS — E83.42 HYPOMAGNESEMIA: ICD-10-CM

## 2020-11-09 DIAGNOSIS — E79.0 ELEVATED BLOOD URIC ACID LEVEL: ICD-10-CM

## 2020-11-09 DIAGNOSIS — M79.10 MYALGIA: Primary | ICD-10-CM

## 2020-11-09 DIAGNOSIS — Z20.822 ENCOUNTER FOR LABORATORY TESTING FOR COVID-19 VIRUS: ICD-10-CM

## 2020-11-09 DIAGNOSIS — R79.89 ELEVATED LFTS: ICD-10-CM

## 2020-11-09 DIAGNOSIS — J18.9 COMMUNITY ACQUIRED PNEUMONIA, UNSPECIFIED LATERALITY: ICD-10-CM

## 2020-11-09 DIAGNOSIS — F41.1 ANXIETY STATE: ICD-10-CM

## 2020-11-09 LAB
A/G RATIO: 1.2 (ref 1.1–2.2)
ACETAMINOPHEN LEVEL: <5 UG/ML (ref 10–30)
ALBUMIN SERPL-MCNC: 3.9 G/DL (ref 3.4–5)
ALP BLD-CCNC: 60 U/L (ref 40–129)
ALT SERPL-CCNC: 118 U/L (ref 10–40)
ANION GAP SERPL CALCULATED.3IONS-SCNC: 11 MMOL/L (ref 3–16)
AST SERPL-CCNC: 126 U/L (ref 15–37)
BACTERIA: ABNORMAL /HPF
BASOPHILS ABSOLUTE: 0 K/UL (ref 0–0.2)
BASOPHILS RELATIVE PERCENT: 0.6 %
BILIRUB SERPL-MCNC: 0.3 MG/DL (ref 0–1)
BILIRUBIN URINE: NEGATIVE
BLOOD, URINE: NEGATIVE
BUN BLDV-MCNC: 20 MG/DL (ref 7–20)
CALCIUM SERPL-MCNC: 9.2 MG/DL (ref 8.3–10.6)
CHLORIDE BLD-SCNC: 105 MMOL/L (ref 99–110)
CLARITY: CLEAR
CO2: 23 MMOL/L (ref 21–32)
COLOR: YELLOW
CREAT SERPL-MCNC: 0.7 MG/DL (ref 0.6–1.2)
EOSINOPHILS ABSOLUTE: 0 K/UL (ref 0–0.6)
EOSINOPHILS RELATIVE PERCENT: 0.7 %
EPITHELIAL CELLS, UA: ABNORMAL /HPF (ref 0–5)
GFR AFRICAN AMERICAN: >60
GFR NON-AFRICAN AMERICAN: >60
GLOBULIN: 3.2 G/DL
GLUCOSE BLD-MCNC: 137 MG/DL (ref 70–99)
GLUCOSE BLD-MCNC: 169 MG/DL (ref 70–99)
GLUCOSE URINE: NEGATIVE MG/DL
HCT VFR BLD CALC: 40.7 % (ref 36–48)
HEMOGLOBIN: 13.9 G/DL (ref 12–16)
HYALINE CASTS: ABNORMAL /LPF (ref 0–2)
KETONES, URINE: NEGATIVE MG/DL
LACTIC ACID: 2 MMOL/L (ref 0.4–2)
LEUKOCYTE ESTERASE, URINE: ABNORMAL
LIPASE: 61 U/L (ref 13–60)
LYMPHOCYTES ABSOLUTE: 1.2 K/UL (ref 1–5.1)
LYMPHOCYTES RELATIVE PERCENT: 32.7 %
MAGNESIUM: 1.6 MG/DL (ref 1.8–2.4)
MCH RBC QN AUTO: 33.8 PG (ref 26–34)
MCHC RBC AUTO-ENTMCNC: 34.1 G/DL (ref 31–36)
MCV RBC AUTO: 99.1 FL (ref 80–100)
MICROSCOPIC EXAMINATION: YES
MONOCYTES ABSOLUTE: 0.2 K/UL (ref 0–1.3)
MONOCYTES RELATIVE PERCENT: 6.4 %
MUCUS: ABNORMAL /LPF
NEUTROPHILS ABSOLUTE: 2.1 K/UL (ref 1.7–7.7)
NEUTROPHILS RELATIVE PERCENT: 59.6 %
NITRITE, URINE: NEGATIVE
PDW BLD-RTO: 11.6 % (ref 12.4–15.4)
PERFORMED ON: ABNORMAL
PH UA: 5 (ref 5–8)
PLATELET # BLD: 70 K/UL (ref 135–450)
PMV BLD AUTO: 11.6 FL (ref 5–10.5)
POTASSIUM REFLEX MAGNESIUM: 4.7 MMOL/L (ref 3.5–5.1)
PRO-BNP: 240 PG/ML (ref 0–449)
PROTEIN UA: NEGATIVE MG/DL
RAPID INFLUENZA  B AGN: NEGATIVE
RAPID INFLUENZA A AGN: NEGATIVE
RBC # BLD: 4.11 M/UL (ref 4–5.2)
RBC UA: ABNORMAL /HPF (ref 0–4)
RENAL EPITHELIAL, UA: ABNORMAL /HPF (ref 0–1)
SODIUM BLD-SCNC: 139 MMOL/L (ref 136–145)
SPECIFIC GRAVITY UA: 1.02 (ref 1–1.03)
TOTAL CK: 41 U/L (ref 26–192)
TOTAL PROTEIN: 7.1 G/DL (ref 6.4–8.2)
TROPONIN: <0.01 NG/ML
URIC ACID, SERUM: 6.4 MG/DL (ref 2.6–6)
URINE TYPE: ABNORMAL
UROBILINOGEN, URINE: 0.2 E.U./DL
WBC # BLD: 3.5 K/UL (ref 4–11)
WBC UA: ABNORMAL /HPF (ref 0–5)

## 2020-11-09 PROCEDURE — 81001 URINALYSIS AUTO W/SCOPE: CPT

## 2020-11-09 PROCEDURE — 6370000000 HC RX 637 (ALT 250 FOR IP): Performed by: INTERNAL MEDICINE

## 2020-11-09 PROCEDURE — 87077 CULTURE AEROBIC IDENTIFY: CPT

## 2020-11-09 PROCEDURE — G0480 DRUG TEST DEF 1-7 CLASSES: HCPCS

## 2020-11-09 PROCEDURE — 83605 ASSAY OF LACTIC ACID: CPT

## 2020-11-09 PROCEDURE — 96365 THER/PROPH/DIAG IV INF INIT: CPT

## 2020-11-09 PROCEDURE — 99285 EMERGENCY DEPT VISIT HI MDM: CPT

## 2020-11-09 PROCEDURE — 2580000003 HC RX 258: Performed by: PHYSICIAN ASSISTANT

## 2020-11-09 PROCEDURE — 87086 URINE CULTURE/COLONY COUNT: CPT

## 2020-11-09 PROCEDURE — 84145 PROCALCITONIN (PCT): CPT

## 2020-11-09 PROCEDURE — 80053 COMPREHEN METABOLIC PANEL: CPT

## 2020-11-09 PROCEDURE — 96375 TX/PRO/DX INJ NEW DRUG ADDON: CPT

## 2020-11-09 PROCEDURE — 85025 COMPLETE CBC W/AUTO DIFF WBC: CPT

## 2020-11-09 PROCEDURE — 82550 ASSAY OF CK (CPK): CPT

## 2020-11-09 PROCEDURE — 87040 BLOOD CULTURE FOR BACTERIA: CPT

## 2020-11-09 PROCEDURE — 84443 ASSAY THYROID STIM HORMONE: CPT

## 2020-11-09 PROCEDURE — 83690 ASSAY OF LIPASE: CPT

## 2020-11-09 PROCEDURE — 87804 INFLUENZA ASSAY W/OPTIC: CPT

## 2020-11-09 PROCEDURE — 84550 ASSAY OF BLOOD/URIC ACID: CPT

## 2020-11-09 PROCEDURE — 71045 X-RAY EXAM CHEST 1 VIEW: CPT

## 2020-11-09 PROCEDURE — 93005 ELECTROCARDIOGRAM TRACING: CPT | Performed by: PHYSICIAN ASSISTANT

## 2020-11-09 PROCEDURE — 1200000000 HC SEMI PRIVATE

## 2020-11-09 PROCEDURE — 87186 SC STD MICRODIL/AGAR DIL: CPT

## 2020-11-09 PROCEDURE — 84484 ASSAY OF TROPONIN QUANT: CPT

## 2020-11-09 PROCEDURE — 80074 ACUTE HEPATITIS PANEL: CPT

## 2020-11-09 PROCEDURE — 6360000002 HC RX W HCPCS: Performed by: EMERGENCY MEDICINE

## 2020-11-09 PROCEDURE — 6370000000 HC RX 637 (ALT 250 FOR IP): Performed by: PHYSICIAN ASSISTANT

## 2020-11-09 PROCEDURE — 83735 ASSAY OF MAGNESIUM: CPT

## 2020-11-09 PROCEDURE — 6370000000 HC RX 637 (ALT 250 FOR IP): Performed by: EMERGENCY MEDICINE

## 2020-11-09 PROCEDURE — 36415 COLL VENOUS BLD VENIPUNCTURE: CPT

## 2020-11-09 PROCEDURE — 83880 ASSAY OF NATRIURETIC PEPTIDE: CPT

## 2020-11-09 PROCEDURE — 2580000003 HC RX 258: Performed by: EMERGENCY MEDICINE

## 2020-11-09 RX ORDER — ONDANSETRON 4 MG/1
4 TABLET, ORALLY DISINTEGRATING ORAL ONCE
Status: COMPLETED | OUTPATIENT
Start: 2020-11-09 | End: 2020-11-09

## 2020-11-09 RX ORDER — LORAZEPAM 0.5 MG/1
0.5 TABLET ORAL 2 TIMES DAILY PRN
Status: DISCONTINUED | OUTPATIENT
Start: 2020-11-09 | End: 2020-11-11 | Stop reason: HOSPADM

## 2020-11-09 RX ORDER — ALBUTEROL SULFATE 90 UG/1
2 AEROSOL, METERED RESPIRATORY (INHALATION)
Status: DISCONTINUED | OUTPATIENT
Start: 2020-11-10 | End: 2020-11-10

## 2020-11-09 RX ORDER — NICOTINE POLACRILEX 4 MG
15 LOZENGE BUCCAL PRN
Status: DISCONTINUED | OUTPATIENT
Start: 2020-11-09 | End: 2020-11-11 | Stop reason: HOSPADM

## 2020-11-09 RX ORDER — LORAZEPAM 1 MG/1
1 TABLET ORAL ONCE
Status: COMPLETED | OUTPATIENT
Start: 2020-11-09 | End: 2020-11-09

## 2020-11-09 RX ORDER — ONDANSETRON 2 MG/ML
4 INJECTION INTRAMUSCULAR; INTRAVENOUS EVERY 6 HOURS PRN
Status: DISCONTINUED | OUTPATIENT
Start: 2020-11-09 | End: 2020-11-11 | Stop reason: HOSPADM

## 2020-11-09 RX ORDER — M-VIT,TX,IRON,MINS/CALC/FOLIC 27MG-0.4MG
1 TABLET ORAL DAILY
Status: DISCONTINUED | OUTPATIENT
Start: 2020-11-10 | End: 2020-11-11 | Stop reason: HOSPADM

## 2020-11-09 RX ORDER — ACETAMINOPHEN 650 MG/1
650 SUPPOSITORY RECTAL EVERY 6 HOURS PRN
Status: DISCONTINUED | OUTPATIENT
Start: 2020-11-09 | End: 2020-11-11 | Stop reason: HOSPADM

## 2020-11-09 RX ORDER — DEXTROSE MONOHYDRATE 25 G/50ML
12.5 INJECTION, SOLUTION INTRAVENOUS PRN
Status: DISCONTINUED | OUTPATIENT
Start: 2020-11-09 | End: 2020-11-11 | Stop reason: HOSPADM

## 2020-11-09 RX ORDER — HYDROCODONE BITARTRATE AND ACETAMINOPHEN 5; 325 MG/1; MG/1
1 TABLET ORAL ONCE
Status: COMPLETED | OUTPATIENT
Start: 2020-11-09 | End: 2020-11-09

## 2020-11-09 RX ORDER — PROMETHAZINE HYDROCHLORIDE 25 MG/1
12.5 TABLET ORAL EVERY 6 HOURS PRN
Status: DISCONTINUED | OUTPATIENT
Start: 2020-11-09 | End: 2020-11-11 | Stop reason: HOSPADM

## 2020-11-09 RX ORDER — POLYETHYLENE GLYCOL 3350 17 G/17G
17 POWDER, FOR SOLUTION ORAL DAILY PRN
Status: DISCONTINUED | OUTPATIENT
Start: 2020-11-09 | End: 2020-11-11 | Stop reason: HOSPADM

## 2020-11-09 RX ORDER — MAGNESIUM SULFATE IN WATER 40 MG/ML
2 INJECTION, SOLUTION INTRAVENOUS ONCE
Status: COMPLETED | OUTPATIENT
Start: 2020-11-09 | End: 2020-11-09

## 2020-11-09 RX ORDER — PANTOPRAZOLE SODIUM 20 MG/1
20 TABLET, DELAYED RELEASE ORAL DAILY
Status: DISCONTINUED | OUTPATIENT
Start: 2020-11-10 | End: 2020-11-10

## 2020-11-09 RX ORDER — CHOLECALCIFEROL (VITAMIN D3) 125 MCG
1000 CAPSULE ORAL DAILY
Status: DISCONTINUED | OUTPATIENT
Start: 2020-11-10 | End: 2020-11-11 | Stop reason: HOSPADM

## 2020-11-09 RX ORDER — ACETAMINOPHEN 325 MG/1
650 TABLET ORAL EVERY 6 HOURS PRN
Status: DISCONTINUED | OUTPATIENT
Start: 2020-11-09 | End: 2020-11-11 | Stop reason: HOSPADM

## 2020-11-09 RX ORDER — SODIUM CHLORIDE 9 MG/ML
INJECTION, SOLUTION INTRAVENOUS CONTINUOUS
Status: DISCONTINUED | OUTPATIENT
Start: 2020-11-10 | End: 2020-11-11 | Stop reason: HOSPADM

## 2020-11-09 RX ORDER — ALBUTEROL SULFATE 90 UG/1
2 AEROSOL, METERED RESPIRATORY (INHALATION) EVERY 6 HOURS PRN
Status: DISCONTINUED | OUTPATIENT
Start: 2020-11-09 | End: 2020-11-11 | Stop reason: HOSPADM

## 2020-11-09 RX ORDER — 0.9 % SODIUM CHLORIDE 0.9 %
500 INTRAVENOUS SOLUTION INTRAVENOUS ONCE
Status: COMPLETED | OUTPATIENT
Start: 2020-11-09 | End: 2020-11-09

## 2020-11-09 RX ORDER — DEXTROSE MONOHYDRATE 50 MG/ML
100 INJECTION, SOLUTION INTRAVENOUS PRN
Status: DISCONTINUED | OUTPATIENT
Start: 2020-11-09 | End: 2020-11-11 | Stop reason: HOSPADM

## 2020-11-09 RX ORDER — SODIUM CHLORIDE 0.9 % (FLUSH) 0.9 %
10 SYRINGE (ML) INJECTION PRN
Status: DISCONTINUED | OUTPATIENT
Start: 2020-11-09 | End: 2020-11-11 | Stop reason: HOSPADM

## 2020-11-09 RX ORDER — SODIUM CHLORIDE 0.9 % (FLUSH) 0.9 %
10 SYRINGE (ML) INJECTION EVERY 12 HOURS SCHEDULED
Status: DISCONTINUED | OUTPATIENT
Start: 2020-11-10 | End: 2020-11-11 | Stop reason: HOSPADM

## 2020-11-09 RX ADMIN — DEXTROSE MONOHYDRATE 500 MG: 50 INJECTION, SOLUTION INTRAVENOUS at 21:48

## 2020-11-09 RX ADMIN — LORAZEPAM 1 MG: 1 TABLET ORAL at 21:56

## 2020-11-09 RX ADMIN — MAGNESIUM SULFATE HEPTAHYDRATE 2 G: 40 INJECTION, SOLUTION INTRAVENOUS at 22:53

## 2020-11-09 RX ADMIN — HYDROCODONE BITARTRATE AND ACETAMINOPHEN 1 TABLET: 5; 325 TABLET ORAL at 18:14

## 2020-11-09 RX ADMIN — CEFTRIAXONE SODIUM 1 G: 1 INJECTION, POWDER, FOR SOLUTION INTRAMUSCULAR; INTRAVENOUS at 21:44

## 2020-11-09 RX ADMIN — ONDANSETRON 4 MG: 4 TABLET, ORALLY DISINTEGRATING ORAL at 21:55

## 2020-11-09 RX ADMIN — SODIUM CHLORIDE 500 ML: 9 INJECTION, SOLUTION INTRAVENOUS at 18:13

## 2020-11-09 ASSESSMENT — PAIN DESCRIPTION - DESCRIPTORS: DESCRIPTORS: ACHING;BURNING

## 2020-11-09 ASSESSMENT — PAIN SCALES - GENERAL
PAINLEVEL_OUTOF10: 8
PAINLEVEL_OUTOF10: 8
PAINLEVEL_OUTOF10: 0
PAINLEVEL_OUTOF10: 0

## 2020-11-09 ASSESSMENT — VISUAL ACUITY: OU: 1

## 2020-11-09 ASSESSMENT — PAIN DESCRIPTION - PAIN TYPE: TYPE: ACUTE PAIN

## 2020-11-09 NOTE — ED PROVIDER NOTES
[facility]        Pt Name: Kelly Cedeno  MRN: [id number]  [place] 1/7/1930  Date of evaluation: 11/9/2020  Provider: RATNA Young  PCP: Son Ortiz PA-C    This patient was seen and evaluated by the attending physician Yandy Heart        CHIEF COMPLAINT       Chief Complaint   Patient presents with    Other     Pt presents to ED via [facility] from home with complaints of skin burning all over X 1 week. Pt. is alert and oriented X 3. HISTORY OF PRESENT ILLNESS   (Location/Symptom, Timing/Onset, Context/Setting, Quality, Duration, Modifying Factors, Severity)  Note limiting factors. Kelly Cedeno is a 80 y.o. female past medical history of coronary artery disease, diabetes, hypertension, hyperlipidemia, pulmonary tuberculosis in 2010, anticoagulated on Eliquis who presents via EMS from her home by herself for myalgias. Onset was 1 week ago. Duration has been the last 7 days. Context includes patient notes that she woke up about a week ago and everything hurt. She notes she has generalized body aches and pains ranging from her back arms and legs into her chest and belly. She was seen and evaluated last week and had an extensive work-up which was allegedly negative. She notes she was told to take Tylenol for pain which is not helping. She also takes a little blue pill for pain she is not sure what it is but it has not been helping. She notes that she has had decreased appetite for the last week however her last bowel movement was today and she urinated multiple times today. She endorses decreased sense of taste and smell. She endorses mild nonproductive cough. She endorses dry scratchy throat. She endorses intermittent headaches which have been chronic but have been going on for the last week. She denies worst headache of her life or vision changes.   She endorses low-grade fevers T-max 99 which have been intermittent for the last several months. She denies any specific abdominal pain or point tenderness, she denies any dysuria frequency or urgency. She denies any recent falls or injuries. Patient was seen and evaluated 3 days ago, EKG revealed sinus rhythm with old left bundle branch block. CT chest abdomen pelvis was obtained, CT abdomen pelvis with IV contrast was negative for acute abnormality. CTPA was obtained no evidence of acute PE but chronic findings including sequela of old granulomatous disease and old bilateral rib fractures were noted. Thrombocytopenia noted, platelets at 74. Mild transaminitis noted, ALT and . Elevated INR at 1.55. Troponin was negative. BNP was within normal limits. Lipase within normal limits. Mild lactic acidosis at 2.2. Urinalysis was negative. Elevated serum uric acid at 6.4. Magnesium mildly low at 1.4. Nursing Notes were all reviewed and agreed with or any disagreements were addressed  in the HPI. REVIEW OF SYSTEMS    (2-9 systems for level 4, 10 or more for level 5)     Review of Systems    Positives and Pertinent negatives as per HPI. Except as noted abovein the ROS, all other systems were reviewed and negative.        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 arthropathy 7/16/2019    TB (pulmonary tuberculosis) 2010         SURGICAL HISTORY     Past Surgical History:   Procedure Laterality Date    CARDIAC CATHETERIZATION      x 4 all normal    CHOLECYSTECTOMY, LAPAROSCOPIC N/A 5/1/13    LIVER BIOPSY; EXCISION OF NECROTIC LYMP NODE OF CROQUET; UMBILICAL HERNIA REPAIR    LUNG SURGERY      partial right lower lobectomy    SHOULDER SURGERY  THROAT SURGERY      vocal cord polyp         CURRENTMEDICATIONS       Previous Medications    ATORVASTATIN (LIPITOR) 20 MG TABLET    Take 1 tablet by mouth nightly    B COMPLEX VITAMINS (B COMPLEX 100 PO)    Take by mouth    DICLOFENAC SODIUM (VOLTAREN) 1 % GEL    APPLY 4 G TOPICALLY 4 TIMES DAILY    ELIQUIS 2.5 MG TABS TABLET        ESOMEPRAZOLE MAGNESIUM (NEXIUM) 20 MG PACK    Take 20 mg by mouth daily    FENOFIBRATE (TRICOR) 145 MG TABLET    fenofibrate nanocrystallized 145 mg tablet    GLIMEPIRIDE (AMARYL) 4 MG TABLET    Take 4 mg by mouth daily as needed Take before breakfast for blood sugar greater than 200. INFLUENZA VAC SPLIT HIGH-DOSE (FLUZONE HIGH-DOSE) 0.5 ML ROSIBEL INJECTION    Fluzone High-Dose [id number] (PF) 180 mcg/0.5 mL intramuscular syringe   TO BE ADMINISTERED BY PHARMACIST FOR IMMUNIZATION    KLOR-CON 10 10 MEQ TABLET        LORAZEPAM (ATIVAN) 0.5 MG TABLET    Take 0.5 mg by mouth 2 times daily. Cleophus Bassett MAG-G 500 (27 MG) MG TABS TABLET        MULTIPLE VITAMINS-MINERALS (THERAPEUTIC MULTIVITAMIN-MINERALS) TABLET    Take 1 tablet by mouth daily    TRUETEST TEST STRIP        VITAMIN B-12 (CYANOCOBALAMIN) 1000 MCG TABLET    Take 1,000 mcg by mouth daily. ALLERGIES     Other; Penicillins; Polysporin [bacitracin-polymyxin b]; and Adhesive tape    FAMILYHISTORY     No family history on file. SOCIAL HISTORY       Social History     Socioeconomic History    Marital status:       Spouse name: Not on file    Number of children: Not on file    Years of education: Not on file    Highest education level: Not on file   Occupational History    Occupation: retired    Social Needs    Financial resource strain: Not on file    Food insecurity     Worry: Not on file     Inability: Not on file   Mobento needs     Medical: Not on file     Non-medical: Not on file   Tobacco Use    Smoking status: Never Smoker    Smokeless tobacco: Never Used   Substance and Sexual Activity    Alcohol use: No     Alcohol/week: 0.0 standard drinks    Drug use: No    Sexual activity: Not Currently     Partners: Male   Lifestyle    Physical activity     Days per week: Not on file     Minutes per session: Not on file    Stress: Not on file   Relationships    Social connections     Talks on phone: Not on file     Gets together: Not on file     Attends Caodaism service: Not on file     Active member of club or organization: Not on file     Attends meetings of clubs or organizations: Not on file     Relationship status: Not on file    Intimate partner violence     Fear of current or ex partner: Not on file     Emotionally abused: Not on file     Physically abused: Not on file     Forced sexual activity: Not on file   Other Topics Concern    Not on file   Social History Narrative    Not on file       SCREENINGS             PHYSICAL EXAM    (up to 7 for level 4, 8 or more for level 5)     ED Triage Vitals [11/09/20 1640]   BP Temp Temp Source Pulse Resp SpO2 Height Weight   127/70 98.4 °F (36.9 °C) Oral 74 16 97 % 5' 4\" (1.626 m) 135 lb (61.2 kg)       Physical Exam  Vitals signs and nursing note reviewed. Constitutional:       General: She is awake. She is not in acute distress. Appearance: Normal appearance. She is well-developed. She is not ill-appearing, toxic-appearing or diaphoretic. HENT:      Head: Normocephalic and atraumatic. No raccoon eyes or Shipman's sign. Jaw: There is normal jaw occlusion. Right Ear: Hearing and external ear normal.      Left Ear: Hearing and external ear normal.      Nose: Nose normal.      Mouth/Throat:      Lips: Pink. Mouth: Mucous membranes are dry. Pharynx: Oropharynx is clear. Uvula midline. Eyes:      General: Lids are normal. Vision grossly intact. Right eye: No discharge. Left eye: No discharge. Extraocular Movements: Extraocular movements intact.       Conjunctiva/sclera: Conjunctivae normal.      Pupils: Pupils are equal, round, and reactive to light. Neck:      Musculoskeletal: Normal range of motion and neck supple. No neck rigidity. Cardiovascular:      Rate and Rhythm: Normal rate and regular rhythm. Pulses:           Radial pulses are 2+ on the right side and 2+ on the left side. Posterior tibial pulses are 2+ on the right side and 2+ on the left side. Heart sounds: Normal heart sounds. No murmur. No friction rub. No gallop. Comments: Non pitting edema to the RLE from ankle to pretibial space. Pt notes \"it gets like that\"  No redness warmth or superficial venous changes. Pulmonary:      Effort: Pulmonary effort is normal. No tachypnea, bradypnea, accessory muscle usage, prolonged expiration, respiratory distress or retractions. Breath sounds: Normal breath sounds and air entry. No wheezing or rales. Comments: No cough observed   Abdominal:      General: Abdomen is flat. Bowel sounds are normal. There is no distension. Palpations: Abdomen is soft. Tenderness: There is no abdominal tenderness. There is no right CVA tenderness or left CVA tenderness. Musculoskeletal:      Right lower leg: Edema present. Left lower leg: No edema. Comments: Generalized TTP without focal origination   No deformities    Lymphadenopathy:      Cervical: No cervical adenopathy. Skin:     General: Skin is warm and dry. Neurological:      General: No focal deficit present. Mental Status: She is alert, oriented to person, place, and time and easily aroused. GCS: GCS eye subscore is 4. GCS verbal subscore is 5. GCS motor subscore is 6. Cranial Nerves: No dysarthria or facial asymmetry. Sensory: Sensation is intact. Motor: Motor function is intact. Psychiatric:         Behavior: Behavior normal. Behavior is cooperative.            DIAGNOSTIC RESULTS   LABS:    Labs Reviewed - No data to display    All other labs were within normal range or not returned as of this dictation. EKG: All EKG's are interpreted by the Emergency Department Physician who either signs orCo-signs this chart in the absence of a cardiologist.  Please see their note for interpretation of EKG. RADIOLOGY:   Non-plain film images such as CT, Ultrasound and MRI are read by the radiologist. Plain radiographic images are visualized andpreliminarily interpreted by the  ED Provider with the below findings:        Interpretation perthe Radiologist below, if available at the time of this note:    No orders to display     No results found. PROCEDURES   Unless otherwise noted below, none     Procedures    CRITICAL CARE TIME   N/A    CONSULTS:  None      EMERGENCY DEPARTMENT COURSE and DIFFERENTIALDIAGNOSIS/MDM:   Vitals:    Vitals:    11/09/20 1640   BP: 127/70   Pulse: 74   Resp: 16   Temp: 98.4 °F (36.9 °C)   TempSrc: Oral   SpO2: 97%   Weight: 135 lb (61.2 kg)   Height: 5' 4\" (1.626 m)       Patient was given thefollowing medications:  Medications - No data to display    PDMP Monitoring:    Last PDMP [facility] SYSTEM as Reviewed [facility]):  Review User Review Instant Review Result            Urine Drug Screenings (1 yr)     Drug screen multi urine  Collected: 9/12/2019  1:10 PM (Final result)    Narrative:  Performed at:  [facility]. [facility]  [address] [place] [address]. [address]   Phone [phone]    Complete Results              Medication Contract and Consent for Opioid Use Documents Filed      No documents found                MDM:   Patient seen and evaluated. Old records reviewed. Diagnostic testing reviewed and results discussed. Patient is a 80-year-old female who presents for evaluation of myalgias. On exam she is alert and oriented afebrile well-perfused hemodynamically stable nontoxic in appearance. Mucous membranes are mildly dry.   She has generalized myalgias with generalized tenderness to palpation without focal deformity or site of focal pain origin. She is distally neurovascularly intact. Heart and lung sounds are within normal limits. She has a soft nontender abdomen without peritoneal signs. Broad work-up initiated, labs EKG chest x-ray. ED Course as of Nov 09 1906   [facility] Nov 09, 2020 1903 Improved from prior. Low concern for sepsis. Lactic Acid: 2.0 [CS]   1903 Around prior. Gout on differential but there is no focal arthritis point. Uric Acid, Serum(!): 6.4 [CS]   1905 WBC(!): 3.5 [CS]      ED Course User Index  [CS] RATNA Mcarthur       WBC 3.5 today, mildly decreased from prior. Platelets around prior, thrombocytopenia appears chronic. Transaminitis appears improved from last but still increased from last year. Lipase also increased from a couple of days ago however pt s/p cholecystecomy. Rapid flu is negative. Covid pending. CXR revealed worsening interstitial lung markings to the right lower side. BNP WNL low concern right heart strain. Will add on Hepatitis panel. Magensium levels and procalcitonin level added. Discussed case with Dr. Werner Saleem who will be taking over care. Consider d/c statin therapy. 7:36 PM: I discussed the history, physical, and treatment plan with Dr. Yandy Heart. Kelly Cedeno was signed out in stable condition. Please see Dr. Yandy Heart note for further details, including diagnosis and disposition. FINAL IMPRESSION      1. Myalgia          DISPOSITION/PLAN   DISPOSITION        PATIENT REFERREDTO:  No follow-up provider specified.     DISCHARGE MEDICATIONS:  New Prescriptions    No medications on file       DISCONTINUED MEDICATIONS:  Discontinued Medications    No medications on file              (Please note that portions ofthis note were completed with a voice recognition program.  Efforts were made to edit the dictations but occasionally words are mis-transcribed.)    RATNA Young (electronically signed)        Sally Eagle PA  11/09/20 1937

## 2020-11-10 LAB
A/G RATIO: 1.3 (ref 1.1–2.2)
ALBUMIN SERPL-MCNC: 3.5 G/DL (ref 3.4–5)
ALP BLD-CCNC: 49 U/L (ref 40–129)
ALT SERPL-CCNC: 86 U/L (ref 10–40)
ANION GAP SERPL CALCULATED.3IONS-SCNC: 11 MMOL/L (ref 3–16)
AST SERPL-CCNC: 95 U/L (ref 15–37)
BASOPHILS ABSOLUTE: 0 K/UL (ref 0–0.2)
BASOPHILS RELATIVE PERCENT: 0.5 %
BILIRUB SERPL-MCNC: 0.4 MG/DL (ref 0–1)
BUN BLDV-MCNC: 14 MG/DL (ref 7–20)
CALCIUM SERPL-MCNC: 8.3 MG/DL (ref 8.3–10.6)
CHLORIDE BLD-SCNC: 106 MMOL/L (ref 99–110)
CO2: 22 MMOL/L (ref 21–32)
CREAT SERPL-MCNC: 0.7 MG/DL (ref 0.6–1.2)
EKG ATRIAL RATE: 71 BPM
EKG DIAGNOSIS: NORMAL
EKG P AXIS: 74 DEGREES
EKG P-R INTERVAL: 204 MS
EKG Q-T INTERVAL: 432 MS
EKG QRS DURATION: 128 MS
EKG QTC CALCULATION (BAZETT): 469 MS
EKG R AXIS: -47 DEGREES
EKG T AXIS: 112 DEGREES
EKG VENTRICULAR RATE: 71 BPM
EOSINOPHILS ABSOLUTE: 0 K/UL (ref 0–0.6)
EOSINOPHILS RELATIVE PERCENT: 1.3 %
ESTIMATED AVERAGE GLUCOSE: 182.9 MG/DL
GFR AFRICAN AMERICAN: >60
GFR NON-AFRICAN AMERICAN: >60
GLOBULIN: 2.8 G/DL
GLUCOSE BLD-MCNC: 117 MG/DL (ref 70–99)
GLUCOSE BLD-MCNC: 250 MG/DL (ref 70–99)
GLUCOSE BLD-MCNC: 251 MG/DL (ref 70–99)
GLUCOSE BLD-MCNC: 291 MG/DL (ref 70–99)
GLUCOSE BLD-MCNC: 69 MG/DL (ref 70–99)
GLUCOSE BLD-MCNC: 78 MG/DL (ref 70–99)
HBA1C MFR BLD: 8 %
HCT VFR BLD CALC: 37.6 % (ref 36–48)
HEMOGLOBIN: 12.9 G/DL (ref 12–16)
LYMPHOCYTES ABSOLUTE: 1.2 K/UL (ref 1–5.1)
LYMPHOCYTES RELATIVE PERCENT: 41 %
MCH RBC QN AUTO: 34.2 PG (ref 26–34)
MCHC RBC AUTO-ENTMCNC: 34.3 G/DL (ref 31–36)
MCV RBC AUTO: 99.8 FL (ref 80–100)
MONOCYTES ABSOLUTE: 0.2 K/UL (ref 0–1.3)
MONOCYTES RELATIVE PERCENT: 7.7 %
NEUTROPHILS ABSOLUTE: 1.5 K/UL (ref 1.7–7.7)
NEUTROPHILS RELATIVE PERCENT: 49.5 %
PDW BLD-RTO: 11.5 % (ref 12.4–15.4)
PERFORMED ON: ABNORMAL
PERFORMED ON: NORMAL
PLATELET # BLD: 66 K/UL (ref 135–450)
PMV BLD AUTO: 11.3 FL (ref 5–10.5)
POTASSIUM REFLEX MAGNESIUM: 4.2 MMOL/L (ref 3.5–5.1)
PROCALCITONIN: 0.13 NG/ML (ref 0–0.15)
RBC # BLD: 3.76 M/UL (ref 4–5.2)
SARS-COV-2, NAAT: DETECTED
SODIUM BLD-SCNC: 139 MMOL/L (ref 136–145)
TOTAL PROTEIN: 6.3 G/DL (ref 6.4–8.2)
TSH REFLEX: 0.6 UIU/ML (ref 0.27–4.2)
WBC # BLD: 2.9 K/UL (ref 4–11)

## 2020-11-10 PROCEDURE — 2580000003 HC RX 258: Performed by: INTERNAL MEDICINE

## 2020-11-10 PROCEDURE — 97116 GAIT TRAINING THERAPY: CPT

## 2020-11-10 PROCEDURE — 6370000000 HC RX 637 (ALT 250 FOR IP): Performed by: INTERNAL MEDICINE

## 2020-11-10 PROCEDURE — 99232 SBSQ HOSP IP/OBS MODERATE 35: CPT | Performed by: INTERNAL MEDICINE

## 2020-11-10 PROCEDURE — 99223 1ST HOSP IP/OBS HIGH 75: CPT | Performed by: INTERNAL MEDICINE

## 2020-11-10 PROCEDURE — 97161 PT EVAL LOW COMPLEX 20 MIN: CPT

## 2020-11-10 PROCEDURE — 92526 ORAL FUNCTION THERAPY: CPT

## 2020-11-10 PROCEDURE — 36415 COLL VENOUS BLD VENIPUNCTURE: CPT

## 2020-11-10 PROCEDURE — 97530 THERAPEUTIC ACTIVITIES: CPT

## 2020-11-10 PROCEDURE — 83036 HEMOGLOBIN GLYCOSYLATED A1C: CPT

## 2020-11-10 PROCEDURE — U0002 COVID-19 LAB TEST NON-CDC: HCPCS

## 2020-11-10 PROCEDURE — 80053 COMPREHEN METABOLIC PANEL: CPT

## 2020-11-10 PROCEDURE — 92610 EVALUATE SWALLOWING FUNCTION: CPT

## 2020-11-10 PROCEDURE — 93010 ELECTROCARDIOGRAM REPORT: CPT | Performed by: INTERNAL MEDICINE

## 2020-11-10 PROCEDURE — 1200000000 HC SEMI PRIVATE

## 2020-11-10 PROCEDURE — 6360000002 HC RX W HCPCS: Performed by: INTERNAL MEDICINE

## 2020-11-10 PROCEDURE — 97165 OT EVAL LOW COMPLEX 30 MIN: CPT

## 2020-11-10 PROCEDURE — 85025 COMPLETE CBC W/AUTO DIFF WBC: CPT

## 2020-11-10 PROCEDURE — U0003 INFECTIOUS AGENT DETECTION BY NUCLEIC ACID (DNA OR RNA); SEVERE ACUTE RESPIRATORY SYNDROME CORONAVIRUS 2 (SARS-COV-2) (CORONAVIRUS DISEASE [COVID-19]), AMPLIFIED PROBE TECHNIQUE, MAKING USE OF HIGH THROUGHPUT TECHNOLOGIES AS DESCRIBED BY CMS-2020-01-R: HCPCS

## 2020-11-10 RX ORDER — ALBUTEROL SULFATE 90 UG/1
2 AEROSOL, METERED RESPIRATORY (INHALATION) EVERY 4 HOURS PRN
Status: DISCONTINUED | OUTPATIENT
Start: 2020-11-10 | End: 2020-11-11 | Stop reason: HOSPADM

## 2020-11-10 RX ORDER — PANTOPRAZOLE SODIUM 20 MG/1
20 TABLET, DELAYED RELEASE ORAL DAILY
Status: DISCONTINUED | OUTPATIENT
Start: 2020-11-10 | End: 2020-11-11 | Stop reason: HOSPADM

## 2020-11-10 RX ORDER — DEXAMETHASONE SODIUM PHOSPHATE 10 MG/ML
6 INJECTION, SOLUTION INTRAMUSCULAR; INTRAVENOUS EVERY 24 HOURS
Status: DISCONTINUED | OUTPATIENT
Start: 2020-11-10 | End: 2020-11-11 | Stop reason: HOSPADM

## 2020-11-10 RX ADMIN — APIXABAN 2.5 MG: 5 TABLET, FILM COATED ORAL at 09:05

## 2020-11-10 RX ADMIN — MAGNESIUM GLUCONATE 500 MG ORAL TABLET 400 MG: 500 TABLET ORAL at 09:05

## 2020-11-10 RX ADMIN — SODIUM CHLORIDE: 9 INJECTION, SOLUTION INTRAVENOUS at 00:13

## 2020-11-10 RX ADMIN — SODIUM CHLORIDE: 9 INJECTION, SOLUTION INTRAVENOUS at 15:49

## 2020-11-10 RX ADMIN — APIXABAN 2.5 MG: 5 TABLET, FILM COATED ORAL at 22:27

## 2020-11-10 RX ADMIN — MULTIPLE VITAMINS W/ MINERALS TAB 1 TABLET: TAB at 09:05

## 2020-11-10 RX ADMIN — PANTOPRAZOLE SODIUM 20 MG: 20 TABLET, DELAYED RELEASE ORAL at 09:05

## 2020-11-10 RX ADMIN — INSULIN LISPRO 3 UNITS: 100 INJECTION, SOLUTION INTRAVENOUS; SUBCUTANEOUS at 23:56

## 2020-11-10 RX ADMIN — CYANOCOBALAMIN TAB 500 MCG 1000 MCG: 500 TAB at 09:05

## 2020-11-10 RX ADMIN — LORAZEPAM 0.5 MG: 0.5 TABLET ORAL at 23:45

## 2020-11-10 RX ADMIN — DEXAMETHASONE SODIUM PHOSPHATE 6 MG: 10 INJECTION, SOLUTION INTRAMUSCULAR; INTRAVENOUS at 09:06

## 2020-11-10 NOTE — ED NOTES
Nurse ambulated pt to restroom urine specimen was collected. Pt tolerated well.      Alber Dacosta RN  11/09/20 [id number]

## 2020-11-10 NOTE — CONSULTS
Patient is being seen at the request of Dr. Angy Weaver for a consultation for pneumonia, FTT    HISTORY OF PRESENT ILLNESS: This is a 17-year-old female with a history of pulmonary tuberculosis who presented to the emergency department on 11/9/2020 with complaints of 1 week severe burning of skin, diffusely, associated with diffuse myalgias and loss of taste and smell. She is not improved with treatment so far in the hospital.    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  She has never used smokeless tobacco.    Scheduled Meds:   pantoprazole  20 mg Oral Daily    apixaban  2.5 mg Oral BID    therapeutic multivitamin-minerals  1 tablet Oral Daily    Magnesium Gluconate  500 mg Oral Daily    vitamin B-12  1,000 mcg Oral Daily    sodium chloride flush  10 mL Intravenous 2 times per day    insulin lispro  0-6 Units Subcutaneous Q6H    azithromycin  500 mg Intravenous Q24H    And    cefTRIAXone (ROCEPHIN) IV  1 g Intravenous Q24H     Continuous Infusions:   sodium chloride 75 mL/hr at 11/10/20 0013    dextrose       PRN Meds:  albuterol sulfate HFA **AND** ipratropium **AND** MDI Treatment, LORazepam, sodium chloride flush, acetaminophen **OR** acetaminophen, polyethylene glycol, promethazine **OR** ondansetron, glucose, dextrose, glucagon (rDNA), dextrose, albuterol sulfate HFA    ALLERGIES:  Patient is allergic to other; penicillins; polysporin [bacitracin-polymyxin b]; and adhesive tape. REVIEW OF SYSTEMS:  Constitutional: Negative for fever  HENT: Negative for sore throat  Eyes: Negative for redness   Respiratory:+ cough  Cardiovascular: Negative for chest pain  Gastrointestinal: Negative for vomiting, diarrhea   Genitourinary: Negative for hematuria   Musculoskeletal: Negative for arthralgias   Skin: Negative for rash  Neurological: Loss of taste and smell   hematological: Negative for adenopathy  Psychiatric/Behavorial: Negative for anxiety    PHYSICAL EXAM:  Blood pressure 122/64, pulse 71, temperature 97.4 °F (36.3 °C), temperature source Oral, resp. rate 16, height 5' 2\" (1.575 m), weight 134 lb 11.2 oz (61.1 kg), SpO2 95 %, not currently breastfeeding.' on RA  Gen: No distress. Eyes: PERRL. No sclera icterus. No conjunctival injection. ENT: No discharge. Pharynx clear. Neck: Trachea midline. No obvious mass. Resp: No accessory muscle use. No crackles. No wheezes. No rhonchi. No dullness on percussion. CV: Regular rate. Regular rhythm. No murmur or rub. No edema. Peripheral pulses are 2+. Capillary refill is less than 3 seconds. GI: Non-tender. Non-distended. No hernia. Skin: Warm and dry. No nodule on exposed extremities. Lymph: No cervical LAD. No supraclavicular LAD. M/S: No cyanosis. No joint deformity. No clubbing. Neuro: Awake. Alert. Moves all four extremities. Psych: Oriented x 3. No anxiety.      LABS:  CBC:   Recent Labs     11/09/20  1810 11/10/20  0653   WBC 3.5* 2.9*   HGB 13.9 12.9   HCT 40.7 37.6   MCV 99.1 99.8   PLT 70* 66*     BMP:   Recent Labs     11/09/20  1810 11/10/20  0653    139   K 4.7 4.2    106   CO2 23 22   BUN 20 14 CREATININE 0.7 0.7     LIVER PROFILE:   Recent Labs     11/09/20  1810 11/10/20  0653   * 95*   * 86*   LIPASE 61.0*  --    BILITOT 0.3 0.4   ALKPHOS 60 49     PT/INR: No results for input(s): PROTIME, INR in the last 72 hours. APTT: No results for input(s): APTT in the last 72 hours. UA:  Recent Labs     11/09/20  1850   COLORU Yellow   PHUR 5.0   WBCUA 3-5   RBCUA None seen   MUCUS Rare*   BACTERIA Rare*   CLARITYU Clear   SPECGRAV 1.025   LEUKOCYTESUR TRACE*   UROBILINOGEN 0.2   BILIRUBINUR Negative   BLOODU Negative   GLUCOSEU Negative     No results for input(s): PHART, SVS1BIK, PO2ART in the last 72 hours. ECG was reviewed by me and shows normal sinus rhythm @ 70 without acute ischemic ST segment elevation but with LBBB    Chest imaging was reviewed by me and showed   11/9/2020 CXR interstitial infiltrates    11/6/2020 CT chest abdomen pelvis  Impression    1.  No evidence for acute pulmonary embolism.  Chronic findings in the chest    including sequela of old granulomatous disease.  Old bilateral rib fractures.         2.  No acute abnormality identified in the abdomen or pelvis.       ASSESSMENT:  · COVID-19 infection with COVID-19 pneumonia  · Mild transaminitis  · Leukopenia  · Thrombocytopenia    PLAN:  · - COVID-19 isolation, droplet plus  · Decadron 6 mg daily, D#1   · Monitor oxygenation, if saturations consistently below 94% I would recommend starting both Remdesevir and Covid convalescent plasma  · DC ceftriaxone and azithromycin  · Continue outpatient Eliquis

## 2020-11-10 NOTE — ED NOTES
Nurse spoke with daughter Rebeca Cameron and informed of admission and bed number.      Tara Regalado RN  11/09/20 [id number]

## 2020-11-10 NOTE — CARE COORDINATION
Case Management Assessment  Initial Evaluation      Patient Name: Mendy Correia  YOB: 1930  Diagnosis: Community acquired pneumonia of right lower lobe of lung [J18.9]  Community acquired pneumonia of right lower lobe of lung [J18.9]  Date / Time: 11/9/2020  4:37 PM    Admission status/Date:11-9/20 inpt  Chart Reviewed: Yes      Patient Interviewed: Yes   Family Interviewed:  No      Hospitalization in the last 30 days:  No      Health Care Decision Maker :     (CM - must 1st enter selection under Navigator - emergency contact- Michael Relationship and pick relationship)   Who do you trust or have selected to make healthcare decisions for you      Met with: spoke with pt via phone  Shea Benitez conducted  (bedside/phone):    Current PCP: Skylar    Financial  Medicare  Precert required for SNF :  N          3 night stay required - Y    ADLS  Support Systems/Care Needs: Children, Friends/Neighbors  Transportation: self    Meal Preparation: self    Housing  Living Arrangements: lives 1st fl apt  Steps: 0  Intent for return to present living arrangements: Yes  Identified Issues:     [address] with [address] : No Agency:(Services)  Type of Home Care Services: None  Passport/Waiver : No  :                      Phone Number:    Passport/Waiver Services: N/A          Durable Medical Equiptment   DME Provider: N/A  Equipment:   Walker_X__Cane_X__RTS___ BSC___Shower Chair___Hospital Bed___W/C____Other________  02 at ____Liter(s)---wears(frequency)_______ HHN ___ CPAP___ BiPap___   N/A____      Home O2 Use :  No    If No for home O2---if presently on O2 during hospitalization:  No  if yes CM to follow for potential DC O2 need  Informed of need for care provider to bring portable home O2 tank on day of discharge for nursing to connect prior to leaving:   Not Indicated  Verbalized agreement/Understanding:   Not Indicated    Community Service Affiliation  Dialysis:  No    · Agency:  · Location:  · Dialysis Schedule:  · Phone:   · Fax: Other Community Services: (ex:PT/OT,Mental Health,Wound Clinic, Cardio/Pul [address]) N/A    DISCHARGE PLAN: Explained Case Management role/services. Reviewed chart and spoke with pt via phone. Role of CM explained. States lives in 1st floor apt alone and plan return. States is IPTA. States still drives. Discussed PT recommendations for Home PT and pt is declining. Noted is on RA. Denies any in home services currently. Will follow and discuss HHC again closer to [place].

## 2020-11-10 NOTE — PROGRESS NOTES
RESPIRATORY THERAPY ASSESSMENT    Name:  Jacy Russell  Medical Record Number:  [id number]  Age: 80 y.o. Gender: female  : 1930  Today's Date:  11/10/2020  Room:  0224/0224-01    Assessment     Is the patient being admitted for a COPD or Asthma exacerbation? No   (If yes the patient will be seen every 4 hours for the first 24 hours and then reassessed)    Patient Admission Diagnosis      Allergies  Allergies   Allergen Reactions    Other      Surgical tape-burned skin,   Surgical tape-burned skin,     Penicillins      Yeast infection    Polysporin [Bacitracin-Polymyxin B] Hives    Adhesive Tape Rash       Minimum Predicted Vital Capacity:               Actual Vital Capacity:                    Pulmonary History:No history  Home Oxygen Therapy:  room air  Home Respiratory Therapy:None   Current Respiratory Therapy:  Albuterol 2PUFFS Q6PRN & Q4W/A,  Atrovent Q4W/A. Respiratory Severity Index(RSI)   Patients with orders for inhalation medications, oxygen, or any therapeutic treatment modality will be placed on Respiratory Protocol. They will be assessed with the first treatment and at least every 72 hours thereafter. The following severity scale will be used to determine frequency of treatment intervention.     Smoking History: No Smoking History = 0    Social History  Social History     Tobacco Use    Smoking status: Never Smoker    Smokeless tobacco: Never Used   Substance Use Topics    Alcohol use: No     Alcohol/week: 0.0 standard drinks    Drug use: No       Recent Surgical History: None = 0  Past Surgical History  Past Surgical History:   Procedure Laterality Date    CARDIAC CATHETERIZATION      x 4 all normal    CHOLECYSTECTOMY, LAPAROSCOPIC N/A 13    LIVER BIOPSY; EXCISION OF NECROTIC LYMP NODE OF CROQUET; UMBILICAL HERNIA REPAIR    LUNG SURGERY      partial right lower lobectomy    SHOULDER SURGERY      THROAT SURGERY      vocal cord polyp       Level of Consciousness: Alert, Oriented, and Cooperative = 0    Level of Activity: Mostly sedentary, minimal walking = 2    Respiratory Pattern: Regular Pattern; RR 8-20 = 0    Breath Sounds: Diminshed bilaterally and/or crackles = 2    Sputum   ,  ,    Cough: Strong, spontaneous, non-productive = 0    Vital Signs   /70   Pulse 63   Temp 98.1 °F (36.7 °C) (Oral)   Resp 17   Ht 5' 2\" (1.575 m)   Wt 134 lb 11.2 oz (61.1 kg)   SpO2 94%   BMI 24.64 kg/m²   SPO2 (COPD values may differ): Greater than or equal to 92% on room air = 0    Peak Flow (asthma only): not applicable = 0    RSI: 0-4 = See once and convert to home regimen or discontinue        Plan       Goals: medication delivery    Patient/caregiver was educated on the proper method of use for Respiratory Care Devices:        Level of patient/caregiver understanding able to:   ? Verbalize understanding   ? Demonstrate understanding       ? Teach back        ? Needs reinforcement       ? No available caregiver               ? Other:     Response to education:       Is patient being placed on Home Treatment Regimen? No     Does the patient have everything they need prior to discharge? NA     Comments: Chart reviewed and patient assessed. Plan of Care: Albuterol 2puffs Q6PRN & Q4PRN,  Atrovent 2PUFFS Q4PRN. Electronically signed by Dolores Talamantes RCP on 11/10/2020 at 12:15 AM    Respiratory Protocol Guidelines     1. Assessment and treatment by Respiratory Therapy will be initiated for medication and therapeutic interventions upon initiation of aerosolized medication. 2. Physician will be contacted for respiratory rate (RR) greater than 35 breaths per minute. Therapy will be held for heart rate (HR) greater than 140 beats per minute, pending direction from physician. 3. Bronchodilators will be administered via Metered Dose Inhaler (MDI) with spacer when the following criteria are met:  a.  Alert and cooperative     b. HR < 140 bpm  c. RR < 30 bpm

## 2020-11-10 NOTE — ED NOTES
After lifting pt. Up in bed pt stated she is feeling nauseated. Got order from Dr. Isabella Balderrama for zofran and lorazepam.  Pt. Denies any other needs at this time.      Jeff Becerra RN  11/09/20 1477

## 2020-11-10 NOTE — PROGRESS NOTES
Inpatient Physical Therapy Evaluation and Treatment    Unit: [facility]  Date:  11/10/2020  Patient Name:    Rosalee Fletcher  Admitting diagnosis:  Community acquired pneumonia of right lower lobe of lung [J18.9]  Community acquired pneumonia of right lower lobe of lung [J18.9]  Admit Date:  11/9/2020  Precautions/Restrictions/WB Status/ Lines/ Wounds/ Oxygen: Fall risk, Bed/chair alarm, Lines -IV, [place] (hard of hearing), Telemetry, Continuous pulse oximetry and Isolation Precautions: Droplet Plus - COVID    Treatment Time: 1200 - 1231  Treatment Number:  1   Timed Code Treatment Minutes: 21 minutes  Total Treatment Minutes:  31  minutes    Patient Goals for Therapy: \" To go home \"          Discharge Recommendations: Home PRN assist and with home PT   DME needs for discharge: Needs Met       Therapy recommendation for EMS Transport: can transport by wheelchair    Therapy recommendations for staff:   Stand by assist with use of No AD for all transfers and ambulation to/from bathroom  within room    History of Present Illness: (H & P taken from [facility] Mina Galan, PA's note dated 83/0/2204)  Rosalee Fletcher is a 80 y.o. female past medical history of coronary artery disease, diabetes, hypertension, hyperlipidemia, pulmonary tuberculosis in 2010, anticoagulated on Eliquis who presents via EMS from her home by herself for myalgias. Onset was 1 week ago. Duration has been the last 7 days. Context includes patient notes that she woke up about a week ago and everything hurt. She notes she has generalized body aches and pains ranging from her back arms and legs into her chest and belly. She was seen and evaluated last week and had an extensive work-up which was allegedly negative. She notes she was told to take Tylenol for pain which is not helping. She also takes a little blue pill for pain she is not sure what it is but it has not been helping.   She notes that she has had decreased appetite for the last week however her last bowel movement was today and she urinated multiple times today. She endorses decreased sense of taste and smell. She endorses mild nonproductive cough. She endorses dry scratchy throat. She endorses intermittent headaches which have been chronic but have been going on for the last week. She denies worst headache of her life or vision changes. She endorses low-grade fevers T-max 99 which have been intermittent for the last several months. She denies any specific abdominal pain or point tenderness, she denies any dysuria frequency or urgency. She denies any recent falls or injuries. FINAL IMPRESSION       1. Myalgia        Home Health S4 Level Recommendation:  Level 1 Standard  AM-PAC Mobility Score    AM-PAC Inpatient Mobility Raw Score : 22  AM-PAC Inpatient Mobility without Stair Climbing Raw Score : 19    Preadmission Environment    Pt. Lives Alone  Home environment:    apartment   Steps to enter first floor:   No steps      Bathroom:       [address] [place] [address] [place] owned:      [address] [place] [address] and [facility] ([address] [place] [address])      Preadmission Status / PLOF:  History of falls             No  Pt. Able to drive          Yes  Pt Fully independent with ADL's         Yes  Pt. Required assistance from family for: Independent PTA    Pt. Fully independent for transfers and gait and walked with: without AD, occassionally used walker/cane    Pain   Yes  Location: bilateral LEs  Rating: mild /10  Pain Medicine Status: RN notified    Cognition    A&O x4   Able to follow 2 step commands, patient gets distracted easily and needed redirection to given task. Patient was also hard oh hearing and did not have her hearing aids so question needed to be repeated frequently. Subjective  Patient lying supine in bed with no family present. Pt agreeable to this PT eval & tx. Upper Extremity ROM/Strength  Please see OT evaluation.       Lower Extremity ROM / Strength   AROM WFL: Yes  ROM limitations: N/A    Strength Assessment (measured on a 0-5 scale):  R LE   Quad   5/5   Ant Tib  5/5   Hamstring 5/5   Iliopsoas 5/5  L LE  Quad   5/5   Ant Tib  5/5   Hamstring 5/5   Iliopsoas 5/5    Lower Extremity Sensation    WFL    Lower Extremity Proprioception:   WFL    Coordination and Tone  WFL    Balance  Sitting:  Normal; Modified Independent  Comments:     Standing: Good - ; SBA  Comments: ~7 min    Bed Mobility   Supine to Sit:    Independent  Sit to Supine:   Independent  Rolling:   Not Tested  Scooting in sitting: Independent  Scooting in supine:  Independent    Transfer Training     Sit to stand:   Supervision  Stand to sit:   Supervision  Bed to Chair:   SBA with use of No AD and gait belt    Gait gait completed as indicated below  Distance:      80 ft  Deviations (firm surface/linoleum):  decreased little, increased FREEDOM, decreased step length bilaterally and decreased stance time bilaterally  Assistive Device Used:    No AD and gait belt  Level of Assist:    SBA  Comment:     Stair Training deferred, pt does not have stairs in home environment    Activity Tolerance   Pt completed therapy session with No adverse symptoms noted w/activity   SpO2: maintained above 95% throughout the activities. Positioning Needs   Pt up in chair, alarm set, positioned in proper neutral alignment and pressure relief provided. Call light provided and all needs within reach    Exercises Initiated  all completed bilaterally unless indicated  Ankle Pumps x 10 reps  LAQ x 10 reps    Other  None. Patient/Family Education   Pt educated on role of inpatient PT, POC, importance of continued activity, DC recommendations, safety awareness, transfer techniques, pursed lip breathing, energy conservation, pacing activity and calling for assist with mobility. Assessment  Pt seen for Physical Therapy evaluation in acute care setting. Patient's daughter called during the session and was given updated with patient's verbal consent regarding her performance in PT.  As per Daughter patient was independent in prior level of function and daughter is available to assist if needed. Pt demonstrated decreased Activity tolerance, Balance, Safety and Strength as well as decreased independence with Ambulation and Transfers. Recommending Home PRN assist and with home PT upon discharge as patient functioning close to baseline level and would benefit from continued therapy services    Goals : To be met in 3 visits:  1). Independent with LE Ex x 10 reps    To be met in 6 visits:  1). Supine to/from sit: N/A  2). Sit to/from stand: Independent  3). Bed to chair: Independent  4). Gait: Ambulate 150 ft.  with  Modified Independent and use of LRAD (least restrictive assistive device)  5). Tolerate B LE exercises 3 sets of 10-15 reps    Rehabilitation Potential: Good  Strengths for achieving goals include:   Pt motivated, Family Support and Pt cooperative   Barriers to achieving goals include:    No Barriers    Plan    To be seen 2-3 x / week  while in acute care setting for therapeutic exercises, bed mobility, transfers, progressive gait training, balance training, and family/patient education. Signature: Mere Cisneros MS PT, # [id number]      If patient discharges from this facility prior to next visit, this note will serve as the Discharge Summary.

## 2020-11-10 NOTE — PROGRESS NOTES
Speech Language Pathology  Facility/Department: [address] MEDICAL-SURGICAL   CLINICAL BEDSIDE SWALLOW EVALUATION    SLP recommends the patient consume regular solids and thin liquids. Check oral cavity post PO to ensure clearance of oral stasis. Given oral stage dysphagia and reports of coughing with intake, SLP to follow. Hold PO and contact SLP if s/s of aspiration develop and/or if mentation/respiratory status changes. NAME: Estefanía John  : [id number]  MRN: [id number]    ADMISSION DATE: 2020  ADMITTING DIAGNOSIS: has Contusion of shoulder region; Incomplete tear of left rotator cuff; Hip strain, right, initial encounter; Contusion of hip; Closed fracture of head of radius; Contusion of elbow; Carpal tunnel syndrome; Enthesopathy of hip; Adhesive capsulitis of shoulder; Chest discomfort; HTN (hypertension); HLD (hyperlipidemia); GERD (gastroesophageal reflux disease); Anxiety; Syncope and collapse; Angina pectoris ([address] Retrocalcaneal bursitis; Tendonitis, Achilles, right; Right Achilles tendinitis; Cancer of cheek ([address] Chest pain; Trochanteric bursitis of left hip; Osteoporosis; Sprain of left hip; 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  ONSET DATE: The patient was admitted to [facility] on 2020    Recent Chest Xray/CT of Chest: (11/10/2020 )  Impression    Worsening interstitial markings in the right lung base        Date of Eval: 11/10/2020  Evaluating Therapist: Silas De Anda    Current Diet level:  Current Diet : NPO  Current Liquid Diet : NPO      Primary Complaint  Patient Complaint: Makenna Perez is a 80 y.o. female past medical history of coronary stage dysphagia  Dysphagia Impression : The patient is seen in room with RN permission. The patient is on RA with SPO2% of 97. The patient reports globus sensation, reflux, and occasional coughing when eating. Can not elaborate if coughing occures with solids or liquids. Takes Nexium daily. SLP assessing the patient with regular, minced and moist solids, and puree. Functional mastication observed with regular and soft solids. Impaired A-P transit suspected via significant oral stasis noted to lingual surface post swallow. Positive swallow initiation noted with all trials via digital palpation to anterior neck. No clinical s/s of aspiration noted with solids or thin liquids/ice chips this date. OME completed with no gross abnormalities observed. SLP recommends the patient consume regular solids and thin liquids. Check oral cavity post PO to ensure clearance of oral stasis. Given oral stage dysphagia and reports of coughing with intake, SLP to follow. Hold PO and contact SLP if s/s of aspiration develop and/or if mentation/respiratory status changes. Dysphagia Outcome Severity Scale: Level 5: Mild dysphagia- Distant supervision. May need one diet consistency restricted     Treatment Plan  Requires SLP Intervention: Yes  Duration/Frequency of Treatment: 2-4x/wk  D/C Recommendations: To be determined       Recommended Diet and Intervention  Diet Solids Recommendation: Regular  Liquid Consistency Recommendation:  Thin  Recommended Form of Meds: PO  Recommendations: Dysphagia treatment  Therapeutic Interventions: Oral care;Diet tolerance monitoring;Patient/Family education    Compensatory Swallowing Strategies  Compensatory Swallowing Strategies: Upright as possible for all oral intake;Remain upright for 30-45 minutes after meals    Treatment/Goals  Short-term Goals  Timeframe for Short-term Goals: 4 days (11/14/2020)  Goal 1: The patient will tolerate recommended diet with no clinical s/s of aspiration 5/5  Goal 2: The patient/caregiver will demonstrate understanding of recommended compensatory strategies for improved swallow safety  Long-term Goals  Timeframe for Long-term Goals: 7 days (11/17/2020)  Goal 1: The patient will tolerate LRD with no clinical s/s of aspiration for optimal nutrition and hydration    General  Chart Reviewed: Yes  Comments: Chart reviewed for completion of assessment  Subjective  Subjective: The patient is seen in room at bedside with RN permission in contact droplet plus precaution  Behavior/Cognition: Alert; Cooperative;Pleasant mood;Confused  Respiratory Status: Room air  Breath Sounds: Clear  O2 Device: None (Room air)  Communication Observation: Functional  Follows Directions: Simple  Dentition: Dentures bottom; Dentures top  Patient Positioning: Upright in chair  Baseline Vocal Quality: Normal  Volitional Cough: Strong  Prior Dysphagia History: No prior dysphagia hx per chart  Consistencies Administered: Reg solid; Dysphagia Pureed (Dysphagia I); Dysphagia Minced and Moist (Dysphagia II); Ice Chips; Thin - cup           Vision/Hearing  Vision  Vision: Within Functional Limits  Hearing  Hearing: Exceptions to [facility]  Hearing Exceptions: Hard of hearing/hearing concerns    Oral Motor Deficits  Oral/Motor  Oral Motor: Within functional limits    Oral Phase Dysfunction  Oral Phase  Oral Phase: Exceptions  Oral Phase Dysfunction  Lingual/Palatal Residue: Reg solid     Indicators of Pharyngeal Phase Dysfunction   Pharyngeal Phase  Pharyngeal Phase: WFL    Prognosis  Prognosis  Prognosis for safe diet advancement: good  Barriers to reach goals: age  Individuals consulted  Consulted and agree with results and recommendations: Patient;RN    Education  Patient Education: SLP re: Role of SLPchapincito for completion of assessment, results, and POC  Patient Education Response: Verbalizes understanding;Needs reinforcement  Safety Devices in place: Yes  Type of devices: All fall risk precautions in place; Left in chair;Call light within reach; Chair alarm in place       Therapy Time  SLP Individual Minutes  Time In: 7160  Time Out: 6706  Minutes: [address] [place], [place]  11/10/2020 3:43 PM  Philip Chase M.A., [address] [facility] #[id number]  Speech-Language Pathologist  Phone: [phone]

## 2020-11-10 NOTE — PLAN OF CARE
Problem: Nutrition  Intervention: Swallowing evaluation  Note: SLP completed evaluation. Please refer to notes in EMR. Intervention: Aspiration precautions  Note: SLP completed evaluation. Please refer to notes in EMR.     Nancy Arellano M.A. [facility] #[id number]  Speech-Language Pathologist

## 2020-11-10 NOTE — PROGRESS NOTES
Inpatient Occupational Therapy  Evaluation and Treatment    Unit: 2 [place]  Date:  11/10/2020  Patient Name:    Jin Chen  Admitting diagnosis:  Community acquired pneumonia of right lower lobe of lung [J18.9]  Community acquired pneumonia of right lower lobe of lung [J18.9]  Admit Date:  11/9/2020  Precautions/Restrictions/WB Status/ Lines/ Wounds/ Oxygen: Fall risk, Bed/chair alarm, Lines -IV, [place] (hard of hearing), Telemetry, Continuous pulse oximetry and Isolation Precautions: Droplet Plus - COVID    Treatment Time: 12:00-12:30  Treatment Number: 1     Billable Treatment Time: 15 minutes   Total Treatment Time:   30   minutes    Patient Goals for Therapy:  \" to go home \"      Discharge Recommendations: Home with PRN assist and home therapy  DME needs for discharge: Needs Met       Therapy recommendations for staff:   Assist of 1 with use of No AD for all ambulation to/from bathroom    History of Present Illness: (H & P taken from RATNA Thomas's note dated 54/1/2329)  Tianna Cabello a 80 y. o. female past medical history of coronary artery disease, diabetes, hypertension, hyperlipidemia, pulmonary tuberculosis in 2010, anticoagulated on Eliquis who presents via EMS from her home by herself for myalgias. Onset was 1 week ago. Duration has been the last 7 days.  Context includes patient notes that she woke up about a week ago and everything hurt.  She notes she has generalized body aches and pains ranging from her back arms and legs into her chest and belly.  She was seen and evaluated last week and had an extensive work-up which was allegedly negative.  She notes she was told to take Tylenol for pain which is not helping.  She also takes a little blue pill for pain she is not sure what it is but it has not been helping.  She notes that she has had decreased appetite for the last week however her last bowel movement was today and she urinated multiple times today. Shana Nazario endorses decreased sense of taste and smell.  She endorses mild nonproductive cough.  She endorses dry scratchy throat.  She endorses intermittent headaches which have been chronic but have been going on for the last week.  She denies worst headache of her life or vision changes.  She endorses low-grade fevers T-max 99 which have been intermittent for the last several months.  She denies any specific abdominal pain or point tenderness, she denies any dysuria frequency or urgency.  She denies any recent falls or injuries. Home Health S4 Level Recommendation:  Level 1 Standard  AM-PAC Score: AM-PAC Inpatient Daily Activity Raw Score: 21    Preadmission Environment    Pt. Lives Alone  Home environment:  apartment   Steps to enter first floor:   No steps    Bathroom:  [facility] owned:  [facility] and [facility] ([address] [place] [address])     Preadmission Status / PLOF:  History of falls   No  Pt. Able to drive   Yes  Pt Fully independent with ADL's  Yes  Pt. Required assistance from family for: Independent PTA    Pt. Fully independent for transfers and gait and walked with: No Device    Pain  Yes  Rating:mild  Location: B LEs  Pain Medicine Status: Denies need      Cognition    A&O x4   Able to follow 2 step commands    Subjective  Patient lying supine in bed with no family present - no visitors present due to COVID-19 restrictions  Pt agreeable to this OT eval & tx. Upper Extremity ROM:    WFL,  pt able to perform all bed mobility, transfers, and gait without ROM limitation. Upper Extremity Strength:    Strength Assessment (measured on a 0-5 scale):    B UEs 4+/5    Upper Extremity Sensation    WFL    Upper Extremity Proprioception:  WFL    Coordination and Tone  Diminished    Balance  Functional Sitting Balance:  WFL  Functional Standing Balance:Diminished    Bed mobility:    Supine to sit:    Independent  Sit to supine:   Not Tested  Rolling:    Not Tested  Scooting in sitting:  Independent  Scooting to head of bed:   Not Tested    Bridging:   Not Tested    Transfers:    Sit to stand:  Supervision  Stand to sit:  Supervision  Bed to chair:   SBA  Standard toilet: Not Tested  Bed to [facility]:  Not Tested    Dressing:      UE:   Not Tested  LE:    Not Tested    Bathing:    UE:  Not Tested  LE:  Not Tested    Eating:   Not Tested    Toileting:  Not Tested    Activity Tolerance   Pt completed therapy session with No adverse symptoms noted w/activity  SpO2: maintained above 95% throughout the activities. Positioning Needs:   Up in chair, call light and needs in reach. Alarm Set    Exercise / Activities Initiated:   N/A    Patient/Family Education:   Role of OT  Recommendations for DC    Assessment of Deficits: Pt seen for Occupational therapy evaluation in acute care setting. Pt demonstrated decreased Activity tolerance, ADLs, IADLs, Balance , Bathing, Bed mobility, Dressing, ROM, Safety Awareness, Strength, Transfers, Cognition and Coping Skills. Pt functioning below baseline and will likely benefit from skilled occupational therapy services to maximize safety and independence. Goal(s) : To be met in 3 Visits:  1). Bed to toilet/BSC: Independent    To be met in 5 Visits:  1). Supine to/from Sit:  Independent  2). Upper Body Bathing:   Independent  3). Lower Body Bathing:   Independent  4). Upper Body Dressing:  Independent  5). Lower Body Dressing:  Independent  6). Pt to demonstrate UE exs x 15 reps with minimal cues    Rehabilitation Potential:  Good for goals listed above. Strengths for achieving goals include: Pt motivated, PLOF, Family Support and Pt cooperative  Barriers to achieving goals include:  Complexity of condition     Plan: To be seen 2-3 x/wk  while in acute care setting for therapeutic exercises, bed mobility, transfers, dressing, bathing, family/patient education, ADL/IADL retraining, energy conservation training.        Jose Alberto Jefferson OTR/L #[id number]        If patient discharges from this facility prior to next visit, this note will serve as the Discharge Summary

## 2020-11-10 NOTE — PLAN OF CARE
Problem: Falls - Risk of:  Goal: Will remain free from falls  Description: Will remain free from falls  Outcome: Ongoing     Problem: Falls - Risk of:  Goal: Absence of physical injury  Description: Absence of physical injury  Outcome: Ongoing     Problem: Gas Exchange - Impaired  Goal: Promote optimal lung function  Outcome: Ongoing

## 2020-11-10 NOTE — PROGRESS NOTES
Progress Note    Admit Date:  11/9/2020    80year-old female admitted with COVID-19 infection. Subjective:  Ms. Sintia Sepulveda seen. She is in droplet plus precautions. No distress  Afebrile. Oxygen saturation stable on room air today. Objective:   /64   Pulse 78   Temp 97.4 °F (36.3 °C) (Oral)   Resp 16   Ht 5' 2\" (1.575 m)   Wt 134 lb 11.2 oz (61.1 kg)   SpO2 95%   BMI 24.64 kg/m²       Intake/Output Summary (Last 24 hours) at 11/10/2020 1625  Last data filed at 11/9/2020 2248  Gross per 24 hour   Intake 750 ml   Output --   Net 750 ml         Physical Exam:  General:  Awake, alert, NAD  Skin:  Warm and dry  Neck:  JVD absent. Neck supple  Chest:  Clear to auscultation, respiration easy. No wheezes, rales or rhonchi. Cardiovascular:  RRR ,S1S2 normal  Abdomen:  Soft, non tender, non distended, BS +  Extremities:  No edema. Intact peripheral pulses. Brisk cap refill, < 2 secs  Neuro: non focal      Medications:   Scheduled Meds:   pantoprazole  20 mg Oral Daily    dexamethasone  6 mg Intravenous Q24H    apixaban  2.5 mg Oral BID    therapeutic multivitamin-minerals  1 tablet Oral Daily    magnesium oxide   Oral Daily    vitamin B-12  1,000 mcg Oral Daily    sodium chloride flush  10 mL Intravenous 2 times per day    insulin lispro  0-6 Units Subcutaneous Q6H       Continuous Infusions:   sodium chloride 75 mL/hr at 11/10/20 1549    dextrose         Data:  CBC:   Recent Labs     11/09/20  1810 11/10/20  0653   WBC 3.5* 2.9*   RBC 4.11 3.76*   HGB 13.9 12.9   HCT 40.7 37.6   MCV 99.1 99.8   RDW 11.6* 11.5*   PLT 70* 66*     BMP:   Recent Labs     11/09/20  1810 11/10/20  0653    139   K 4.7 4.2    106   CO2 23 22   BUN 20 14   CREATININE 0.7 0.7     BNP: No results for input(s): BNP in the last 72 hours. PT/INR: No results for input(s): PROTIME, INR in the last 72 hours. APTT: No results for input(s): APTT in the last 72 hours.   CARDIAC ENZYMES:   Recent Labs 11/09/20 1810   TROPONINI <0.01     FASTING LIPID PANEL:  Lab Results   Component Value Date    CHOL 123 07/05/2018    HDL 36 (L) 07/05/2018    TRIG 176 (H) 07/05/2018     LIVER PROFILE:   Recent Labs     11/09/20  1810 11/10/20  0653   * 95*   * 86*   BILITOT 0.3 0.4   ALKPHOS 60 52        Radiology  XR CHEST PORTABLE   Final Result   Worsening interstitial markings in the right lung base               Assessment:  Active Problems:    Community acquired pneumonia of right lower lobe of lung    Pneumonia due to COVID-19 virus  Resolved Problems:    * No resolved hospital problems.  *      Plan:  #COVID-19 infection  -Pulmonology consult  -Monitor oxygen saturation  -Started on Decadron 6 mg daily,  D #1    #Elevated LFTs  -Monitor  -Hold statins    #Leukopenia  -Monitor white blood count    #Diabetes mellitus type 2  -Sliding scale insulin    #History of mild dementia  -Symptomatic management  -Family concerned that patient is unable to care for herself at home    # History of lung cancer with previous right lower lobe lobectomy    # Patient on chronic anticoagulation with Eliquis, etiology unclear         Full Code    Catalina Driver MD 11/10/2020 4:25 PM

## 2020-11-10 NOTE — PROGRESS NOTES
I saw note from Lizeth Choudhary RN. The consult was non-stat and so I was not notified until after 7 am this morning. Call back was specifically not requested, per answering service.

## 2020-11-10 NOTE — PROGRESS NOTES
Patient admitted to room 224 from [facility] ER. Patient oriented to room, call light, bed rails, phone, lights and bathroom. Patient instructed about the schedule of the day including: vital sign frequency, lab draws, possible tests, frequency of MD and staff rounds, daily weights, I &O's and prescribed diet. Bed alarm on. Telemetry box in place, patient aware of placement and reason. Bed locked, in lowest position, side rails up 2/4, call light within reach. Recliner Assessment:     Patient is able to demonstrated the ability to move from a reclining position to an upright position within the recliner. 4 Eyes Skin Assessment:     The patient is being assess for   Admission    I agree that an RN has performed a thorough Head to Toe Skin Assessment on the patient. ALL assessment sites listed below have been assessed. Areas assessed by both nurses:   [x]   Head, Face, and Ears   [x]   Shoulders, Back, and Chest, Abdomen  [x]   Arms, Elbows, and Hands   [x]   Coccyx, Sacrum, and Ischium  [x]   Legs, Feet, and Heels        Scattered bruises bilat arms and legs    Does the Patient have Skin Breakdown?   No          Jatin Prevention initiated:  NA   Wound Care Orders initiated:  No      WOC nurse consulted for Pressure Injury (Stage 3,4, Unstageable, DTI, NWPT, Complex wounds)and New or Established Ostomies:  No      Primary Nurse eSignature: Electronically signed by Fabienne Acevedo RN on 11/10/20 at 1:40 AM EST

## 2020-11-10 NOTE — H&P
Hospital Medicine History & Physical      PCP: Myriam Stiles PA-C    Date of Service: Pt seen/examined on 11/9/20 and admitted on 11/9/20 to Inpatient    Chief Complaint   Patient presents with    Other     Pt presents to ED via [facility] from home with complaints of skin burning all over X 1 week. Pt. is alert and oriented X 3. History Of Present Illness: The patient is a 80 y.o. female with PMH below, presents with \"skin burning\", diffuse myalgias, decreased sense of taste and smell, scratchy throat, non-productive cough. She reports that she has had these sx which have been progressive over the last week. She is very hard of hearing and it is difficult to collect hx from her although she appears oriented. Daughter reportedly told ER that she was concerned about pt's ability to care for herself at home. Pt lives alone. 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  Authorizing Provider   ELIQUIS 2.5 MG TABS tablet  10/21/19  Yes Historical Provider, MD   fenofibrate (TRICOR) 145 MG tablet fenofibrate nanocrystallized 145 mg tablet   Yes Historical Provider, MD   atorvastatin (LIPITOR) 20 MG tablet Take 1 tablet by mouth nightly 7/5/18  Yes Nik Acevedo MD   LORazepam (ATIVAN) 0.5 MG tablet Take 0.5 mg by mouth 2 times daily. .   Yes Historical Provider, MD   glimepiride (AMARYL) 4 MG tablet Take 4 mg by mouth daily as needed Take before breakfast for blood sugar greater than 200. Yes Historical Provider, MD   esomeprazole Magnesium (NEXIUM) 20 MG PACK Take 20 mg by mouth daily   Yes Historical Provider, MD   Multiple Vitamins-Minerals (THERAPEUTIC MULTIVITAMIN-MINERALS) tablet Take 1 tablet by mouth daily   Yes Historical Provider, MD   B Complex Vitamins (B COMPLEX 100 PO) Take by mouth   Yes Historical Provider, MD   MAG-G 500 (27 MG) MG TABS tablet  8/4/15  Yes Historical Provider, MD   KLOR-CON 10 10 MEQ tablet  7/14/15  Yes Historical Provider, MD   vitamin B-12 (CYANOCOBALAMIN) 1000 MCG tablet Take 1,000 mcg by mouth daily. Yes Historical Provider, MD   diclofenac sodium (VOLTAREN) 1 % GEL APPLY 4 G TOPICALLY 4 TIMES DAILY 7/2/20 8/1/20  Keila Brar, DO   Influenza Vac Split High-Dose (FLUZONE HIGH-DOSE) 0.5 ML ROSIBEL injection Fluzone High-Dose 5062-0009 (PF) 180 mcg/0.5 mL intramuscular syringe   TO BE ADMINISTERED BY PHARMACIST FOR IMMUNIZATION    Historical Provider, MD   TRUETEST TEST strip  7/29/15   Historical Provider, MD       Allergies: Other; Penicillins; Polysporin [bacitracin-polymyxin b]; and Adhesive tape    Social History:    TOBACCO:   reports that she has never smoked. She has never used smokeless tobacco.  ETOH:   reports no history of alcohol use. Family History:  Reviewed in detail and negative for DM, Early CAD, Cancer (except as below). Positive as follows:    History reviewed. No pertinent family history.     REVIEW OF SYSTEMS:   Pertinent positives/negatives as follows: \"skin burning\", diffuse myalgias, decreased sense of taste and smell, scratchy throat, non-productive cough, and as discussed in HPI, otherwise a complete ROS performed Multiplanar reformatted images are provided for review. Dose modulation, iterative reconstruction, and/or weight based adjustment of the mA/kV was utilized to reduce the radiation dose to as low as reasonably achievable. COMPARISON: Chest radiograph today. CT abdomen and pelvis 05/12/2019. chest CT 07/04/2018 HISTORY: ORDERING SYSTEM PROVIDED HISTORY: Severe back pain in the midthoracic area (possible dissection versus pulmonary etiology TECHNOLOGIST PROVIDED HISTORY: Reason for exam:->Severe back pain in the midthoracic area (possible dissection versus pulmonary etiology Reason for Exam: Severe thoracic back pain Acuity: Acute Type of Exam: Initial; ORDERING SYSTEM PROVIDED HISTORY: abd pain TECHNOLOGIST PROVIDED HISTORY: Additional Contrast?->IV Reason for exam:->abd pain Reason for Exam: Severe thoracic back pain Acuity: Acute Type of Exam: Initial FINDINGS: CHEST CT: Pulmonary Arteries: Pulmonary arteries are adequately opacified for evaluation. No evidence of intraluminal filling defect to suggest pulmonary embolism. Main pulmonary artery is normal in caliber. Mediastinum: Sequela of old granulomatous disease. No enlarged or suspicious-appearing lymph nodes. The heart and pericardium demonstrate no acute abnormality. There is no acute abnormality of the thoracic aorta. Lungs/pleura: Sequela of old granulomatous disease. Few small noncalcified nodules are present as well measuring up to 6 mm in the right upper lobe on axial image 74, stable since at least 2018 compatible with a benign process. Mild emphysematous disease. No consolidation, evidence for edema or effusion. The central airway is patent. Soft Tissues/Bones: Old bilateral rib fractures. No acute fracture identified. No soft tissue hematoma. ABDOMEN PELVIS: Organs: Status post cholecystectomy. The liver, pancreas, spleen, adrenals and kidneys reveal no acute findings. GI/Bowel: There is no bowel dilatation or wall thickening identified. Diverticulosis. Pelvis: No acute findings. Trace amount of intraluminal gas within the bladder, suggestive of recent instrumentation. Peritoneum/Retroperitoneum: No free air or free fluid. The aorta is normal in caliber. The visceral branches are patent. No lymphadenopathy. Bones/Soft Tissues: No abnormality identified. 1.  No evidence for acute pulmonary embolism. Chronic findings in the chest including sequela of old granulomatous disease. Old bilateral rib fractures. 2.  No acute abnormality identified in the abdomen or pelvis. Xr Chest Portable    Result Date: 11/9/2020  EXAMINATION: ONE XRAY VIEW OF THE CHEST 11/9/2020 5:54 pm COMPARISON: None. HISTORY: ORDERING SYSTEM PROVIDED HISTORY: cough TECHNOLOGIST PROVIDED HISTORY: Reason for exam:->cough Reason for Exam: cough Acuity: Acute Type of Exam: Initial FINDINGS: Persistent blunting of right costophrenic angle with some increase interstitial markings lung bases. Cardiac silhouette within normal limits. Worsening interstitial markings in the right lung base     Ct Chest Pulmonary Embolism W Contrast    Result Date: 11/6/2020  EXAMINATION: CTA OF THE CHEST; CT OF THE ABDOMEN AND PELVIS WITH CONTRAST 11/6/2020 4:35 pm TECHNIQUE: CTA of the chest was performed after the administration of intravenous contrast.  Multiplanar reformatted images are provided for review. MIP images are provided for review. Dose modulation, iterative reconstruction, and/or weight based adjustment of the mA/kV was utilized to reduce the radiation dose to as low as reasonably achievable.; CT of the abdomen and pelvis was performed with the administration of intravenous contrast. Multiplanar reformatted images are provided for review. Dose modulation, iterative reconstruction, and/or weight based adjustment of the mA/kV was utilized to reduce the radiation dose to as low as reasonably achievable. COMPARISON: Chest radiograph today. CT abdomen and pelvis 05/12/2019.  chest abnormality identified. 1.  No evidence for acute pulmonary embolism. Chronic findings in the chest including sequela of old granulomatous disease. Old bilateral rib fractures. 2.  No acute abnormality identified in the abdomen or pelvis. EKG 12 Lead [[id number]]      Collected: 11/09/20 1915     Updated: 11/09/20 1918      Ventricular Rate  71  BPM     Atrial Rate  71  BPM     P-R Interval  204  ms     QRS Duration  128  ms     Q-T Interval  432  ms     QTc Calculation (Bazett)  469  ms     P Axis  74  degrees     R Axis  -47  degrees     T Axis  112  degrees     Diagnosis  Normal sinus rhythm with sinus arrhythmiaLeft axis deviationLeft bundle branch blockAbnormal ECGNo previous ECGs available      CBC:  Recent Labs     11/09/20 1810   WBC 3.5*   HGB 13.9   HCT 40.7   PLT 70*        RENAL  Recent Labs     11/09/20  1810      K 4.7      CO2 23   BUN 20   CREATININE 0.7   GLUCOSE 169*       Hemoglobin a1c:  Lab Results   Component Value Date    LABA1C 7.4 07/05/2018    LABA1C 7.4 10/11/2016       LFT'S:  Recent Labs     11/09/20  1810   *   *   BILITOT 0.3   ALKPHOS 60     CARDIAC ENZYMES:   Recent Labs     11/09/20  1810   TROPONINI <0.01     Lab Results   Component Value Date    PROBNP 240 11/09/2020    PROBNP 177 11/06/2020    PROBNP 440 10/10/2016     LACTIC ACID:  Recent Labs     11/09/20  1810   LACTA 2.0     U/A:  Recent Labs     11/09/20  1850   LEUKOCYTESUR TRACE*   BACTERIA Rare*   WBCUA 3-5   COLORU Yellow   RBCUA None seen   MUCUS Rare*   CLARITYU Clear   SPECGRAV 1.025   BLOODU Negative   GLUCOSEU Negative     PHYSICIAN CERTIFICATION  I certify that Federico Buenrostro is expected to be hospitalized for 2 midnights based on the following assessment and plan:    ASSESSMENT/PLAN:  1. PNA, RLL, IV ceftriaxone and azithro, chk resp cx. Aspiration? SLP eval.  NPO. Pulm c/s.    2. Myalgias x 1 week, viral illness? COVID r/o.    3. Elevated LFT, mild to moderate - see above. Hep panel pending. Defer to DD for GI c/s if needed. Hold statin and fenofibrate. Monitor. 4. Hypomagnesemia, 1.6, 2g MgSO4 ordered. Replacement protocol. 5. Leukopenia, 3.5, monitor. 6. DM2, hold oral Rx, chk A1c, add Low SSI. 7. COVID r/o, added Rapid, f/u PCR/labs. Droplet +.  8. Hx mild dementia, family expressed concern about pt being able to care for self at home (lives alone). PT/OT, SW c/s.   9. Hx partial RLL lobectomy, remote, 2/2 lung cancer. Reason for appearance of RLL on CXR? DVT Prophylaxis: Eliquis  Diet: NPO pending SLP recs. Code Status: Full Code  PT/OT Eval Status: Will order if needed and as patient condition allows  Dispo - Admit to inpatient 2w    Noe Brooks MD    Thank you Avila Lainez PA-C for the opportunity to be involved in this patient's care. If you have any questions or concerns please feel free to contact me via the [employer] at [phone]. This chart was generated using the [address] [place] [address] [address]  Play[employer]ation system. I created this record but it may contain dictation errors given the limitations of this technology.

## 2020-11-10 NOTE — ED PROVIDER NOTES
Emergency Department Provider Note     Location: [place] [address] [place]  11/9/2020    I independently performed a history and physical on Holli Moore. All diagnostic, treatment, and disposition decisions were made by myself in conjunction with the mid-level provider. Briefly, this is a 80 y.o. female here for body aches/burning, occasional cough. ED Triage Vitals [11/09/20 1640]   BP Temp Temp Source Pulse Resp SpO2 Height Weight   127/70 98.4 °F (36.9 °C) Oral 74 16 97 % 5' 4\" (1.626 m) 135 lb (61.2 kg)   Exam:  no acute distress, A&Ox4, heart RRR, no M/G/R, lungs with bibasilar crackles, right worse than left, resp. Nonlabored, no abd tenderness, no peritoneal signs/no rebound/no guarding, very anxious, slight nonpitting of right lower extremity, patient and daughter reports his chronic, very fast pressured speech, occasionally takes a deep breath, and cannot recall what she was trying to say, but states this is typical for her      Patient seen and examined. Xr Chest Portable    Result Date: 11/9/2020  EXAMINATION: ONE XRAY VIEW OF THE CHEST 11/9/2020 5:54 pm COMPARISON: None. HISTORY: ORDERING SYSTEM PROVIDED HISTORY: cough TECHNOLOGIST PROVIDED HISTORY: Reason for exam:->cough Reason for Exam: cough Acuity: Acute Type of Exam: Initial FINDINGS: Persistent blunting of right costophrenic angle with some increase interstitial markings lung bases. Cardiac silhouette within normal limits.      Worsening interstitial markings in the right lung base       Results for orders placed or performed during the hospital encounter of 11/09/20   Rapid influenza A/B antigens    Specimen: Nasopharyngeal   Result Value Ref Range    Rapid Influenza A Ag Negative Negative    Rapid Influenza B Ag Negative Negative   CBC Auto Differential   Result Value Ref Range    WBC 3.5 (L) 4.0 - 11.0 K/uL    RBC 4.11 4.00 - 5.20 M/uL    Hemoglobin 13.9 12.0 - 16.0 g/dL    Hematocrit 40.7 36.0 - 48.0 %    MCV 99.1 80.0 - 100.0 fL    MCH 33.8 26.0 - 34.0 pg    MCHC 34.1 31.0 - 36.0 g/dL    RDW 11.6 (L) 12.4 - 15.4 %    Platelets 70 (L) 179 - 450 K/uL    MPV 11.6 (H) 5.0 - 10.5 fL    Neutrophils % 59.6 %    Lymphocytes % 32.7 %    Monocytes % 6.4 %    Eosinophils % 0.7 %    Basophils % 0.6 %    Neutrophils Absolute 2.1 1.7 - 7.7 K/uL    Lymphocytes Absolute 1.2 1.0 - 5.1 K/uL    Monocytes Absolute 0.2 0.0 - 1.3 K/uL    Eosinophils Absolute 0.0 0.0 - 0.6 K/uL    Basophils Absolute 0.0 0.0 - 0.2 K/uL   Comprehensive Metabolic Panel w/ Reflex to MG   Result Value Ref Range    Sodium 139 136 - 145 mmol/L    Potassium reflex Magnesium 4.7 3.5 - 5.1 mmol/L    Chloride 105 99 - 110 mmol/L    CO2 23 21 - 32 mmol/L    Anion Gap 11 3 - 16    Glucose 169 (H) 70 - 99 mg/dL    BUN 20 7 - 20 mg/dL    CREATININE 0.7 0.6 - 1.2 mg/dL    GFR Non-African American >60 >60    GFR African American >60 >60    Calcium 9.2 8.3 - 10.6 mg/dL    Total Protein 7.1 6.4 - 8.2 g/dL    Alb 3.9 3.4 - 5.0 g/dL    Albumin/Globulin Ratio 1.2 1.1 - 2.2    Total Bilirubin 0.3 0.0 - 1.0 mg/dL    Alkaline Phosphatase 60 40 - 129 U/L     (H) 10 - 40 U/L     (H) 15 - 37 U/L    Globulin 3.2 g/dL   Lactic Acid, Plasma   Result Value Ref Range    Lactic Acid 2.0 0.4 - 2.0 mmol/L   Urinalysis   Result Value Ref Range    Color, UA Yellow Straw/Yellow    Clarity, UA Clear Clear    Glucose, Ur Negative Negative mg/dL    Bilirubin Urine Negative Negative    Ketones, Urine Negative Negative mg/dL    Specific Gravity, UA 1.025 1.005 - 1.030    Blood, Urine Negative Negative    pH, UA 5.0 5.0 - 8.0    Protein, UA Negative Negative mg/dL    Urobilinogen, Urine 0.2 <2.0 E.U./dL    Nitrite, Urine Negative Negative    Leukocyte Esterase, Urine TRACE (A) Negative    Microscopic Examination YES     Urine Type NotGiven    Lipase   Result Value Ref Range    Lipase 61.0 (H) 13.0 - 60.0 U/L   CK   Result Value Ref Range    Total CK 41 26 - 192 U/L   Troponin   Result Value Ref Range unclear etiology for the elevated LFTs at this time, sent further labs, I was also concerned for the possibility the statin could be contributing to her myalgias, she had some pain improvement with Norco, declined need for anything else but her nighttime Ativan \"little blue pill\", I spoke to Dr. Imani Allan hospitalist at [facility] who agreed to accept for admission. Orders Placed This Encounter   Procedures    Culture, Urine    Rapid influenza A/B antigens    Culture, Blood 2    Culture, Blood 1    XR CHEST PORTABLE    CBC Auto Differential    Comprehensive Metabolic Panel w/ Reflex to MG    Lactic Acid, Plasma    Urinalysis    Lipase    CK    COVID-19    Troponin    Brain Natriuretic Peptide    Uric Acid    Hepatitis panel, acute    Microscopic Urinalysis    Magnesium    TSH with Reflex    Acetaminophen Level    Procalcitonin    Inpatient consult to Hospitalist    EKG 12 Lead     Orders Placed This Encounter   Medications    HYDROcodone-acetaminophen (NORCO) 5-325 MG per tablet 1 tablet    0.9 % sodium chloride bolus    magnesium sulfate 2 g in 50 mL IVPB premix    cefTRIAXone (ROCEPHIN) 1 g in sterile water 10 mL IV syringe     Order Specific Question:   Antimicrobial Indications     Answer:   Pneumonia (CAP)    azithromycin (ZITHROMAX) 500 mg in D5W 250ml addavial     Order Specific Question:   Antimicrobial Indications     Answer:   Pneumonia (CAP)    LORazepam (ATIVAN) tablet 1 mg    ondansetron (ZOFRAN-ODT) disintegrating tablet 4 mg     ED Course as of Nov 10 0834   [facility] Nov 09, 2020 1903 Improved from prior. Low concern for sepsis. Lactic Acid: 2.0 [CS]   1903 Around prior. Gout on differential but there is no focal arthritis point.     Uric Acid, Serum(!): 6.4 [CS]   1905 WBC(!): 3.5 [CS]   1955 EKG interpretation by me: Normal sinus rhythm with sinus arrhythmia, left axis deviation, left bundle branch block, rate 71, QTc 469, no ST segment or T wave changes indicative of acute ischemia, the left bundle branch block was there compared on 11/6/2020 EKG   EKG 12 Lead [SY]   2000 PT ambulated but notes feeling unsteady. Gait nonantalgic. O2 sat after ambulation was 97%. [CS]      ED Course User Index  [CS] Sharorn Brooks  [SY] Smith Jeffery DO       Clinical Impression:  1. Myalgia    2. Hypomagnesemia    3. Community acquired pneumonia, unspecified laterality    4. Anxiety state    5. Encounter for laboratory testing for COVID-19 virus    6. Elevated blood uric acid level    7. Elevated LFTs      Disposition:  Patient admitted to the hospital, sent to [facility] in stable condition. This chart was generated in part by using Dragon Dictation system and may contain errors related to that system including errors in grammar, punctuation, and spelling, as well as words and phrases that may be inappropriate. If there are any questions or concerns please feel free to contact the dictating provider for clarification.           Smith Jeffery DO  11/10/20 5587

## 2020-11-11 VITALS
WEIGHT: 135 LBS | SYSTOLIC BLOOD PRESSURE: 129 MMHG | BODY MASS INDEX: 24.84 KG/M2 | OXYGEN SATURATION: 90 % | DIASTOLIC BLOOD PRESSURE: 87 MMHG | TEMPERATURE: 98 F | HEART RATE: 58 BPM | RESPIRATION RATE: 17 BRPM | HEIGHT: 62 IN

## 2020-11-11 LAB
GLUCOSE BLD-MCNC: 192 MG/DL (ref 70–99)
GLUCOSE BLD-MCNC: 194 MG/DL (ref 70–99)
GLUCOSE BLD-MCNC: 322 MG/DL (ref 70–99)
HAV IGM SER IA-ACNC: NORMAL
HEPATITIS B CORE IGM ANTIBODY: NORMAL
HEPATITIS B SURFACE ANTIGEN INTERPRETATION: NORMAL
HEPATITIS C ANTIBODY INTERPRETATION: NORMAL
PERFORMED ON: ABNORMAL

## 2020-11-11 PROCEDURE — 99232 SBSQ HOSP IP/OBS MODERATE 35: CPT | Performed by: INTERNAL MEDICINE

## 2020-11-11 PROCEDURE — 6360000002 HC RX W HCPCS: Performed by: INTERNAL MEDICINE

## 2020-11-11 PROCEDURE — 2580000003 HC RX 258: Performed by: INTERNAL MEDICINE

## 2020-11-11 PROCEDURE — 99238 HOSP IP/OBS DSCHRG MGMT 30/<: CPT | Performed by: INTERNAL MEDICINE

## 2020-11-11 PROCEDURE — 6370000000 HC RX 637 (ALT 250 FOR IP): Performed by: INTERNAL MEDICINE

## 2020-11-11 RX ORDER — DEXAMETHASONE 4 MG/1
4 TABLET ORAL
Qty: 8 TABLET | Refills: 0 | Status: SHIPPED | OUTPATIENT
Start: 2020-11-11 | End: 2020-11-19

## 2020-11-11 RX ADMIN — CYANOCOBALAMIN TAB 500 MCG 1000 MCG: 500 TAB at 08:53

## 2020-11-11 RX ADMIN — PANTOPRAZOLE SODIUM 20 MG: 20 TABLET, DELAYED RELEASE ORAL at 08:53

## 2020-11-11 RX ADMIN — Medication 10 ML: at 08:55

## 2020-11-11 RX ADMIN — INSULIN LISPRO 1 UNITS: 100 INJECTION, SOLUTION INTRAVENOUS; SUBCUTANEOUS at 06:35

## 2020-11-11 RX ADMIN — DEXAMETHASONE SODIUM PHOSPHATE 6 MG: 10 INJECTION, SOLUTION INTRAMUSCULAR; INTRAVENOUS at 08:54

## 2020-11-11 RX ADMIN — MAGNESIUM GLUCONATE 500 MG ORAL TABLET 400 MG: 500 TABLET ORAL at 08:53

## 2020-11-11 RX ADMIN — MULTIPLE VITAMINS W/ MINERALS TAB 1 TABLET: TAB at 08:53

## 2020-11-11 RX ADMIN — INSULIN LISPRO 4 UNITS: 100 INJECTION, SOLUTION INTRAVENOUS; SUBCUTANEOUS at 12:41

## 2020-11-11 RX ADMIN — APIXABAN 2.5 MG: 5 TABLET, FILM COATED ORAL at 08:53

## 2020-11-11 NOTE — PLAN OF CARE
Problem: Falls - Risk of:  Goal: Will remain free from falls  Description: Will remain free from falls  Outcome: Ongoing  Goal: Absence of physical injury  Description: Absence of physical injury  Outcome: Ongoing     Problem: Airway Clearance - Ineffective  Goal: Achieve or maintain patent airway  Outcome: Ongoing     Problem: Gas Exchange - Impaired  Goal: Absence of hypoxia  Outcome: Ongoing  Goal: Promote optimal lung function  Outcome: Ongoing     Problem: Breathing Pattern - Ineffective  Goal: Ability to achieve and maintain a regular respiratory rate  Outcome: Ongoing     Problem:  Body Temperature -  Risk of, Imbalanced  Goal: Ability to maintain a body temperature within defined limits  Outcome: Ongoing  Goal: Will regain or maintain usual level of consciousness  Outcome: Ongoing  Goal: Complications related to the disease process, condition or treatment will be avoided or minimized  Outcome: Ongoing     Problem: Isolation Precautions - Risk of Spread of Infection  Goal: Prevent transmission of infection  Outcome: Ongoing     Problem: Nutrition Deficits  Goal: Optimize nutrtional status  Outcome: Ongoing     Problem: Risk for Fluid Volume Deficit  Goal: Maintain normal heart rhythm  Outcome: Ongoing  Goal: Maintain absence of muscle cramping  Outcome: Ongoing  Goal: Maintain normal serum potassium, sodium, calcium, phosphorus, and pH  Outcome: Ongoing     Problem: Loneliness or Risk for Loneliness  Goal: Demonstrate positive use of time alone when socialization is not possible  Outcome: Ongoing     Problem: Fatigue  Goal: Verbalize increase energy and improved vitality  Outcome: Ongoing     Problem: Patient Education: Go to Patient Education Activity  Goal: Patient/Family Education  Outcome: Ongoing     Problem: Infection:  Goal: Will remain free from infection  Description: Will remain free from infection  Outcome: Ongoing     Problem: Safety:  Goal: Free from accidental physical injury  Description: Free from accidental physical injury  Outcome: Ongoing  Goal: Free from intentional harm  Description: Free from intentional harm  Outcome: Ongoing     Problem: Daily Care:  Goal: Daily care needs are met  Description: Daily care needs are met  Outcome: Ongoing     Problem: Pain:  Goal: Patient's pain/discomfort is manageable  Description: Patient's pain/discomfort is manageable  Outcome: Ongoing     Problem: Skin Integrity:  Goal: Skin integrity will stabilize  Description: Skin integrity will stabilize  Outcome: Ongoing     Problem: Discharge Planning:  Goal: Patients continuum of care needs are met  Description: Patients continuum of care needs are met  Outcome: Ongoing     Problem: Nutrition  Intervention: Swallowing evaluation  11/10/2020 1545 by JOSE Junior  Note: SLP completed evaluation. Please refer to notes in EMR. Intervention: Aspiration precautions  11/10/2020 1545 by JOSE Junior  Note: SLP completed evaluation. Please refer to notes in EMR.

## 2020-11-11 NOTE — CARE COORDINATION
DISCHARGE ORDER  Date/Time 2020 4:43 PM  Completed by: Lyndsey Heart, Case Management    Patient Name: Good Louise      : 1930  Admitting Diagnosis: Community acquired pneumonia of right lower lobe of lung [J18.9]  Community acquired pneumonia of right lower lobe of lung [J18.9]      Admit order Date and Status: 20        Noted discharge order. If applicable PT/OT recommendation at Discharge: home PT/OT  DME recommendation by PT/OT:n/a  Confirmed discharge plan: Yes  with Pt  If pt confirmed DC plan does family need to be contacted by CM No  Discharge Plan: home alone. Pt repeatedly declining [facility] despite this RN CM offering and explaining services. Pt states that she has 61 family members including grand children that will help her and a lot of them are healthcare professionals. Pt currently dressed in room awaiting nurse to remove IV and states that he daughter is picking her up. Reviewed chart. Role of discharge planner explained and patient verbalized understanding. Discharge order is noted.     Has Home O2 in place on admit:  No  Informed of need to bring portable home O2 tank on day of discharge for nursing to connect prior to leaving:   No  Verbalized agreement/Understanding:   Not Indicated

## 2020-11-11 NOTE — PROGRESS NOTES
pelvis. ASSESSMENT:  · COVID-19 infection with COVID-19 pneumonia  · Mild transaminitis  · Leukopenia  · Thrombocytopenia    PLAN:  · - COVID-19 isolation, droplet plus  · Decadron 6 mg daily, D#2/10   · Monitor oxygenation, if saturations consistently below 94% I would recommend returning to the emergency department    · DC ceftriaxone and azithromycin  · Continue outpatient Eliquis  · Okay for d/c planning.   F/U with PCP with repeat PA LAT in 4-6 weeks

## 2020-11-11 NOTE — CARE COORDINATION
INTERDISCIPLINARY PLAN OF CARE CONFERENCE    Date/Time: 11/11/2020 12:22 PM  Completed by: Marlen Carson, Case Management      Patient Name:  Edmundo Barnard  YOB: 1930  Admitting Diagnosis: Community acquired pneumonia of right lower lobe of lung [J18.9]  Community acquired pneumonia of right lower lobe of lung [J18.9]     Admit Date/Time:  11/9/2020  4:37 PM    Chart reviewed. Interdisciplinary team contacted or reviewed plan related to patient progress and discharge plans. Disciplines included Case Management, Nursing, and Dietitian. Current Status: Stable  PT/OT recommendation for discharge plan of care: Home prn assist and home PT    Expected D/C Disposition:  Home  Confirmed plan with patient and/or family Yes confirmed with: (name) Crystal Carvajal    Discharge Plan Comments: Reviewed chart and spoke with pt via phone D/T covid precautions. States cont plan to return  home at [place]. Spoke with crystalmode Carvajal via phone with pt permission. Per [facility] pt lives alone and still drives and cares for self at home. [facility] pt was checked by neurology approx 1 yr age and told she does not have dementia. [facility] pt can return home and she has no concerns of her being home. Discussed PT recommendations for home PT and pt declining. Writer will discuss with pt again prior to dc. Will cont plan for home at [place]. Noted pt is on RA. Will cont to follow.        Home O2 in place on admit: No  Pt informed of need to bring portable home O2 tank on day of discharge for nursing to connect prior to leaving:  Not Indicated  Verbalized agreement/Understanding:  Not Indicated

## 2020-11-11 NOTE — PROGRESS NOTES
Patient discharged to home with daughter. Instructed to isolate for 10 more days per Doctor Jitendra Cagle. Patient in stable condition. Patient left with one prescription in hand.

## 2020-11-11 NOTE — DISCHARGE INSTR - COC
Continuity of Care Form    Patient Name: Maria Elena Phan   :  1930  MRN:  [id number]    [facility] date:  2020  Discharge date:  2020    Code Status Order: Full Code   Advance Directives:   [facility] Documentation       Date/Time Healthcare Directive Type of Healthcare Directive Copy in [address] Agent's Name Healthcare Agent's Phone Number    11/10/20 0041  Yes, patient has an advance directive for healthcare treatment  Durable power of  for health care  No, copy requested from family  Adult [address]  --            Admitting Physician:  Reymundo Rodrigues MD  PCP: Mariola Hernandez PA-C    Discharging Nurse: [facility] Unit/Room#: 0224/0224-01  Discharging Unit Phone Number: [phone]    Emergency Contact:   Extended Emergency Contact Information  Primary Emergency Contact: [address] [facility] Phone: [phone]  Mobile Phone: [phone]  Relation: Child  Secondary Emergency Contact: Moira Lara  Address: [address] [facility], [address] [facility] Phone: [phone]  Mobile Phone: [phone]  Relation: Child    Past Surgical History:  Past Surgical History:   Procedure Laterality Date    CARDIAC CATHETERIZATION      x 4 all normal    CHOLECYSTECTOMY, LAPAROSCOPIC N/A 13    LIVER BIOPSY; EXCISION OF NECROTIC LYMP NODE OF CROQUET; UMBILICAL HERNIA REPAIR    LUNG SURGERY      partial right lower lobectomy    SHOULDER SURGERY      THROAT SURGERY      vocal cord polyp       Immunization History: There is no immunization history for the selected administration types on file for this patient.     Active Problems:  Patient Active Problem List   Diagnosis Code    Contusion of shoulder region S40.019A    Incomplete tear of left rotator cuff M75.112    Hip strain, right, initial encounter S76.011A    Contusion of hip S70.00XA    Closed fracture of head of radius S52.123A    Contusion of elbow S50. 00XA    Carpal tunnel syndrome G56.00    Enthesopathy of hip M76.899    Adhesive capsulitis of shoulder M75.00    Chest discomfort R07.89    HTN (hypertension) I10    HLD (hyperlipidemia) E78.5    GERD (gastroesophageal reflux disease) K21.9    Anxiety F41.9    Syncope and collapse R55    Angina pectoris ([facility]) I20.9    Retrocalcaneal bursitis M77.50    Tendonitis, Achilles, right M76.61    Right Achilles tendinitis M76.61    Cancer of cheek ([facility]) C76.0    Chest pain R07.9    Trochanteric bursitis of left hip M70.62    Osteoporosis M81.0    Sprain of left hip S73.102A    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 [address] E11.9    Transaminitis R74.01    CAD (coronary artery disease) I25.10    Visual hallucinations R44.1    Traumatic incomplete tear of left rotator cuff S46.012A    Community acquired pneumonia of right lower lobe of lung J18.9    Pneumonia due to COVID-19 virus U07.1, J12.89    COVID-19 U07.1    Elevated LFTs R79.89       Isolation/Infection:   Isolation            Droplet Plus          Patient Infection Status       Infection Onset Added Last Indicated Last Indicated By Review Planned Expiration Resolved Resolved By    COVID-19 11/10/20 11/10/20 11/10/20 COVID-19 11/17/20 11/24/20      Resolved    COVID-19 Rule Out 11/09/20 11/09/20 11/10/20 COVID-19 (Ordered)   11/10/20 Rule-Out Test Resulted            Nurse Assessment:  Last Vital Signs: /87   Pulse 58   Temp 98 °F (36.7 °C) (Oral)   Resp 17   Ht 5' 2\" (1.575 m)   Wt 135 lb (61.2 kg)   SpO2 90%   BMI 24.69 kg/m²     Last documented pain score (0-10 scale): Pain Level: 0  Last Weight:   Wt Readings from Last 1 Encounters:   11/11/20 135 lb (61.2 kg)     Mental Status:  oriented and alert    IV Access:  - None    Nursing Mobility/ADLs:  Walking   Assisted  Transfer  Independent  Bathing Independent  Dressing  Independent  Toileting  Independent  Feeding  Independent  Med Admin  Independent  Med Delivery   whole    Wound Care Documentation and Therapy:        Elimination:  Continence:   · Bowel: Yes  · Bladder: Yes  Urinary Catheter: None   Colostomy/Ileostomy/Ileal Conduit: No       Date of Last BM: 11/11/2020    Intake/Output Summary (Last 24 hours) at 11/11/2020 1421  Last data filed at 11/10/2020 2245  Gross per 24 hour   Intake 1436 ml   Output --   Net 1436 ml     I/O last 3 completed shifts: In: 2091 [I.V.:1436]  Out: -     Safety Concerns: At Risk for Falls    Impairments/Disabilities:      None    Nutrition Therapy:  Current Nutrition Therapy:   - Oral Diet:  General    Routes of Feeding: Oral  Liquids: Thin Liquids  Daily Fluid Restriction: no  Last Modified Barium Swallow with Video (Video Swallowing Test): not done    Treatments at the Time of Hospital Discharge:   Respiratory Treatments: none  Oxygen Therapy:  is not on home oxygen therapy.   Ventilator:    - No ventilator support    Rehab Therapies: Physical Therapy and Occupational Therapy  Weight Bearing Status/Restrictions: No weight bearing restirctions  Other Medical Equipment (for information only, NOT a DME order):  walker  Other Treatments:     Patient's personal belongings (please select all that are sent with patient):  Dentures upper    RN SIGNATURE:  Electronically signed by Karol Banks RN on 11/11/20 at 4:13 PM EST    CASE MANAGEMENT/SOCIAL WORK SECTION    Inpatient Status Date: ***    Readmission Risk Assessment Score:  Readmission Risk              Risk of Unplanned Readmission:        18           Discharging to Facility/ Agency   · Name:   · Address:  · Phone:  · Fax:    Dialysis Facility (if applicable)   · Name:  · Address:  · Dialysis Schedule:  · Phone:  · Fax:    / signature: {Esignature:[id number]:::0}    PHYSICIAN SECTION    Prognosis: Good    Condition at Discharge: Stable    Rehab Potential (if transferring to Rehab): Good    Recommended Labs or Other Treatments After Discharge:     Physician Certification: I certify the above information and transfer of Jin Chen  is necessary for the continuing treatment of the diagnosis listed and that she requires Home Care for less 30 days.      Update Admission H&P: No change in H&P    PHYSICIAN SIGNATURE:  Electronically signed by Christine Wild MD on 11/11/20 at 2:21 PM EST

## 2020-11-11 NOTE — PROGRESS NOTES
Given PRN ativan. Pt awake, anxious, tearful; removed IV. Complete linen change and pt back in bed and quiet. Will continue to monitor.

## 2020-11-12 LAB
ORGANISM: ABNORMAL
URINE CULTURE, ROUTINE: ABNORMAL

## 2020-11-13 LAB
BLOOD CULTURE, ROUTINE: NORMAL
CULTURE, BLOOD 2: NORMAL

## 2020-11-16 ENCOUNTER — TELEPHONE (OUTPATIENT)
Dept: OTHER | Facility: CLINIC | Age: 85
End: 2020-11-16

## 2020-11-16 ENCOUNTER — APPOINTMENT (OUTPATIENT)
Dept: GENERAL RADIOLOGY | Age: 85
End: 2020-11-16
Payer: MEDICARE

## 2020-11-16 ENCOUNTER — HOSPITAL ENCOUNTER (EMERGENCY)
Age: 85
Discharge: HOME | End: 2020-11-16
Attending: STUDENT IN AN ORGANIZED HEALTH CARE EDUCATION/TRAINING PROGRAM
Payer: MEDICARE

## 2020-11-16 VITALS
TEMPERATURE: 98 F | RESPIRATION RATE: 23 BRPM | OXYGEN SATURATION: 93 % | WEIGHT: 130 LBS | SYSTOLIC BLOOD PRESSURE: 156 MMHG | DIASTOLIC BLOOD PRESSURE: 76 MMHG | HEIGHT: 65 IN | BODY MASS INDEX: 21.66 KG/M2 | HEART RATE: 54 BPM

## 2020-11-16 LAB
A/G RATIO: 1.3 (ref 1.1–2.2)
ALBUMIN SERPL-MCNC: 3.9 G/DL (ref 3.4–5)
ALP BLD-CCNC: 66 U/L (ref 40–129)
ALT SERPL-CCNC: 95 U/L (ref 10–40)
ANION GAP SERPL CALCULATED.3IONS-SCNC: 12 MMOL/L (ref 3–16)
ANION GAP SERPL CALCULATED.3IONS-SCNC: 15 MMOL/L (ref 3–16)
AST SERPL-CCNC: 87 U/L (ref 15–37)
BASOPHILS ABSOLUTE: 0 K/UL (ref 0–0.2)
BASOPHILS RELATIVE PERCENT: 0.1 %
BILIRUB SERPL-MCNC: 0.8 MG/DL (ref 0–1)
BUN BLDV-MCNC: 32 MG/DL (ref 7–20)
BUN BLDV-MCNC: 35 MG/DL (ref 7–20)
CALCIUM SERPL-MCNC: 9.6 MG/DL (ref 8.3–10.6)
CALCIUM SERPL-MCNC: 9.8 MG/DL (ref 8.3–10.6)
CHLORIDE BLD-SCNC: 95 MMOL/L (ref 99–110)
CHLORIDE BLD-SCNC: 96 MMOL/L (ref 99–110)
CO2: 18 MMOL/L (ref 21–32)
CO2: 19 MMOL/L (ref 21–32)
CREAT SERPL-MCNC: 0.7 MG/DL (ref 0.6–1.2)
CREAT SERPL-MCNC: 0.8 MG/DL (ref 0.6–1.2)
EKG ATRIAL RATE: 57 BPM
EKG DIAGNOSIS: NORMAL
EKG P AXIS: 77 DEGREES
EKG P-R INTERVAL: 184 MS
EKG Q-T INTERVAL: 492 MS
EKG QRS DURATION: 136 MS
EKG QTC CALCULATION (BAZETT): 478 MS
EKG R AXIS: -37 DEGREES
EKG T AXIS: 109 DEGREES
EKG VENTRICULAR RATE: 57 BPM
EOSINOPHILS ABSOLUTE: 0 K/UL (ref 0–0.6)
EOSINOPHILS RELATIVE PERCENT: 0 %
GFR AFRICAN AMERICAN: >60
GFR AFRICAN AMERICAN: >60
GFR NON-AFRICAN AMERICAN: >60
GFR NON-AFRICAN AMERICAN: >60
GLOBULIN: 3 G/DL
GLUCOSE BLD-MCNC: 284 MG/DL (ref 70–99)
GLUCOSE BLD-MCNC: 385 MG/DL (ref 70–99)
GLUCOSE BLD-MCNC: 434 MG/DL (ref 70–99)
HCT VFR BLD CALC: 41.3 % (ref 36–48)
HEMOGLOBIN: 14.5 G/DL (ref 12–16)
LIPASE: 52 U/L (ref 13–60)
LYMPHOCYTES ABSOLUTE: 0.6 K/UL (ref 1–5.1)
LYMPHOCYTES RELATIVE PERCENT: 6 %
MCH RBC QN AUTO: 34 PG (ref 26–34)
MCHC RBC AUTO-ENTMCNC: 35 G/DL (ref 31–36)
MCV RBC AUTO: 97 FL (ref 80–100)
MONOCYTES ABSOLUTE: 0.3 K/UL (ref 0–1.3)
MONOCYTES RELATIVE PERCENT: 3.5 %
NEUTROPHILS ABSOLUTE: 8.4 K/UL (ref 1.7–7.7)
NEUTROPHILS RELATIVE PERCENT: 90.4 %
PDW BLD-RTO: 11.7 % (ref 12.4–15.4)
PERFORMED ON: ABNORMAL
PLATELET # BLD: 138 K/UL (ref 135–450)
PMV BLD AUTO: 11.5 FL (ref 5–10.5)
POTASSIUM REFLEX MAGNESIUM: 4.4 MMOL/L (ref 3.5–5.1)
POTASSIUM REFLEX MAGNESIUM: 4.5 MMOL/L (ref 3.5–5.1)
RBC # BLD: 4.26 M/UL (ref 4–5.2)
SODIUM BLD-SCNC: 127 MMOL/L (ref 136–145)
SODIUM BLD-SCNC: 128 MMOL/L (ref 136–145)
TOTAL PROTEIN: 6.9 G/DL (ref 6.4–8.2)
TROPONIN: <0.01 NG/ML
TROPONIN: <0.01 NG/ML
WBC # BLD: 9.2 K/UL (ref 4–11)

## 2020-11-16 PROCEDURE — 2580000003 HC RX 258: Performed by: STUDENT IN AN ORGANIZED HEALTH CARE EDUCATION/TRAINING PROGRAM

## 2020-11-16 PROCEDURE — 71045 X-RAY EXAM CHEST 1 VIEW: CPT

## 2020-11-16 PROCEDURE — 84484 ASSAY OF TROPONIN QUANT: CPT

## 2020-11-16 PROCEDURE — 99284 EMERGENCY DEPT VISIT MOD MDM: CPT

## 2020-11-16 PROCEDURE — 93005 ELECTROCARDIOGRAM TRACING: CPT | Performed by: STUDENT IN AN ORGANIZED HEALTH CARE EDUCATION/TRAINING PROGRAM

## 2020-11-16 PROCEDURE — 2500000003 HC RX 250 WO HCPCS: Performed by: STUDENT IN AN ORGANIZED HEALTH CARE EDUCATION/TRAINING PROGRAM

## 2020-11-16 PROCEDURE — 85025 COMPLETE CBC W/AUTO DIFF WBC: CPT

## 2020-11-16 PROCEDURE — 6370000000 HC RX 637 (ALT 250 FOR IP): Performed by: STUDENT IN AN ORGANIZED HEALTH CARE EDUCATION/TRAINING PROGRAM

## 2020-11-16 PROCEDURE — 80053 COMPREHEN METABOLIC PANEL: CPT

## 2020-11-16 PROCEDURE — 93010 ELECTROCARDIOGRAM REPORT: CPT | Performed by: INTERNAL MEDICINE

## 2020-11-16 PROCEDURE — 83690 ASSAY OF LIPASE: CPT

## 2020-11-16 PROCEDURE — 96374 THER/PROPH/DIAG INJ IV PUSH: CPT

## 2020-11-16 RX ORDER — 0.9 % SODIUM CHLORIDE 0.9 %
1000 INTRAVENOUS SOLUTION INTRAVENOUS ONCE
Status: COMPLETED | OUTPATIENT
Start: 2020-11-16 | End: 2020-11-16

## 2020-11-16 RX ADMIN — FAMOTIDINE 20 MG: 10 INJECTION INTRAVENOUS at 17:05

## 2020-11-16 RX ADMIN — SODIUM CHLORIDE 1000 ML: 9 INJECTION, SOLUTION INTRAVENOUS at 18:49

## 2020-11-16 RX ADMIN — INSULIN LISPRO 5 UNITS: 100 INJECTION, SOLUTION INTRAVENOUS; SUBCUTANEOUS at 18:48

## 2020-11-16 ASSESSMENT — PAIN DESCRIPTION - PAIN TYPE: TYPE: ACUTE PAIN

## 2020-11-16 ASSESSMENT — ENCOUNTER SYMPTOMS
BACK PAIN: 0
VOMITING: 0
PHOTOPHOBIA: 0
SHORTNESS OF BREATH: 0
SORE THROAT: 0
COUGH: 0
NAUSEA: 0
STRIDOR: 0
RHINORRHEA: 0
ABDOMINAL PAIN: 0

## 2020-11-16 ASSESSMENT — PAIN SCALES - GENERAL: PAINLEVEL_OUTOF10: 6

## 2020-11-16 ASSESSMENT — PAIN DESCRIPTION - ORIENTATION: ORIENTATION: RIGHT

## 2020-11-16 NOTE — ED PROVIDER NOTES
[address] ED  EMERGENCY DEPARTMENT ENCOUNTER      Pt Name: Federico Buenrostro  MRN: [id number]  [place] 1/7/1930  Date of evaluation: 11/16/2020  Provider: Too Farrar, [address] [place] [address]       Chief Complaint   Patient presents with    Chest Pain    Concern For COVID-19         HISTORY OF PRESENT ILLNESS   (Location/Symptom, Timing/Onset, Context/Setting, Quality, Duration, Modifying Factors, Severity)  Note limiting factors. Federico Buenrostro is a 80 y.o. female who presents to the emergency department complaining of burning type sensation in the upper abdomen and lower chest.  Chart review mentions CAD history. Patient unsure of her past medical history. Patient has had a burning type sensation for over a week according to her, known Covid infection, she is been positive for the last 2 weeks recently admitted and discharged on 11/11 for coronavirus, not requiring oxygen. She arrives afebrile, normal SPO2 on room air, denies shortness of breath, cough, fevers, chills, denies nausea vomiting constipation or diarrhea. Nursing Notes were reviewed.     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 7/16/2019    TB (pulmonary tuberculosis) 2010         SURGICAL HISTORY       Past Surgical History:   Procedure Laterality Date    CARDIAC CATHETERIZATION      x 4 all normal    CHOLECYSTECTOMY, LAPAROSCOPIC N/A 5/1/13    LIVER BIOPSY; EXCISION OF NECROTIC LYMP NODE OF CROQUET; UMBILICAL HERNIA REPAIR    LUNG SURGERY      partial right lower lobectomy    SHOULDER SURGERY      THROAT SURGERY      vocal cord polyp         CURRENT MEDICATIONS       Previous Medications    ATORVASTATIN (LIPITOR) 20 MG TABLET    Take 1 tablet by mouth nightly    B COMPLEX VITAMINS (B COMPLEX 100 PO)    Take by mouth    DEXAMETHASONE (DECADRON) 4 MG TABLET    Take 1 tablet by mouth daily (with breakfast) for 8 days    DICLOFENAC SODIUM (VOLTAREN) 1 % GEL    APPLY 4 G TOPICALLY 4 TIMES DAILY    ELIQUIS 2.5 MG TABS TABLET        ESOMEPRAZOLE MAGNESIUM (NEXIUM) 20 MG PACK    Take 20 mg by mouth daily    FENOFIBRATE (TRICOR) 145 MG TABLET    fenofibrate nanocrystallized 145 mg tablet    GLIMEPIRIDE (AMARYL) 4 MG TABLET    Take 4 mg by mouth daily as needed Take before breakfast for blood sugar greater than 200. INFLUENZA VAC SPLIT HIGH-DOSE (FLUZONE HIGH-DOSE) 0.5 ML ROSIBEL INJECTION    Fluzone High-Dose 0428-3776 (PF) 180 mcg/0.5 mL intramuscular syringe   TO BE ADMINISTERED BY PHARMACIST FOR IMMUNIZATION    KLOR-CON 10 10 MEQ TABLET        LORAZEPAM (ATIVAN) 0.5 MG TABLET    Take 0.5 mg by mouth 2 times daily. Kennedy Glow MAG-G 500 (27 MG) MG TABS TABLET        MULTIPLE VITAMINS-MINERALS (THERAPEUTIC MULTIVITAMIN-MINERALS) TABLET    Take 1 tablet by mouth daily    TRUETEST TEST STRIP        VITAMIN B-12 (CYANOCOBALAMIN) 1000 MCG TABLET    Take 1,000 mcg by mouth daily. ALLERGIES     Other; Penicillins; Polysporin [bacitracin-polymyxin b]; and Adhesive tape    FAMILY HISTORY     No family history on file. SOCIAL HISTORY       Social History     Socioeconomic History    Marital status:       Spouse name: Not on file    Number of children: Not on file    Years of education: Not on file    Highest education level: Not on file   Occupational History    Occupation: retired    Social Needs    Financial resource strain: Not on file    Food insecurity     Worry: Not on file     Inability: Not on file   [employer] needs     Medical: Not on file     Non-medical: Not on file   Tobacco Use    Smoking status: Never Smoker    Smokeless tobacco: Never Used   Substance and Sexual Activity    Alcohol use: No     Alcohol/week: 0.0 standard drinks    Drug use: No    Sexual activity: Not Currently     Partners: Male   Lifestyle    Physical activity     Days per week: Not on file     Minutes per session: Not on file    Stress: Not on file   Relationships    Social connections     Talks on phone: Not on file     Gets together: Not on file     Attends Buddhism service: Not on file     Active member of club or organization: Not on file     Attends meetings of clubs or organizations: Not on file     Relationship status: Not on file    Intimate partner violence     Fear of current or ex partner: Not on file     Emotionally abused: Not on file     Physically abused: Not on file     Forced sexual activity: Not on file   Other Topics Concern    Not on file   Social History Narrative    Not on file       SCREENINGS        Gisela Coma Scale  Eye Opening: Spontaneous  Best Verbal Response: Oriented  Best Motor Response: Obeys commands  [place] Coma Scale Score: 15                   REVIEW OF SYSTEMS    (2-9 systems for level 4, 10 or more for level 5)   Review of Systems   Constitutional: Negative for chills and fever. HENT: Negative for congestion, rhinorrhea and sore throat. Eyes: Negative for photophobia and visual disturbance. Respiratory: Negative for cough, shortness of breath and stridor. Cardiovascular: Positive for chest pain. Negative for palpitations and leg swelling. Gastrointestinal: Negative for abdominal pain, nausea and vomiting. Genitourinary: Negative for decreased urine volume. Musculoskeletal: Negative for back pain, neck pain and neck stiffness. Skin: Negative for rash. Allergic/Immunologic: Negative for environmental allergies. Hematological: Negative for adenopathy. Psychiatric/Behavioral: Negative for confusion.          PHYSICAL EXAM    (up to 7 for level 4, 8 or more for level 5)     ED Triage Vitals [11/16/20 1623]   BP Temp Temp Source Pulse Resp SpO2 Height Weight   -- 98 °F (36.7 °C) Oral 57 18 97 % 5' 5\" (1.651 m) 130 lb (59 kg)       Physical Exam  Constitutional:       General: She is not in acute distress. Appearance: She is not diaphoretic. HENT:      Head: Normocephalic and atraumatic. Eyes:      Pupils: Pupils are equal, round, and reactive to light. Neck:      Musculoskeletal: Normal range of motion and neck supple. Trachea: No tracheal deviation. Cardiovascular:      Rate and Rhythm: Normal rate and regular rhythm. Pulmonary:      Effort: Pulmonary effort is normal. No respiratory distress. Abdominal:      General: There is no distension. Palpations: Abdomen is soft. Musculoskeletal: Normal range of motion. Skin:     General: Skin is warm. Neurological:      Mental Status: She is oriented to person, place, and time. DIAGNOSTIC RESULTS     EKG: All EKG's are interpreted by the Emergency Department Physician who either signs or Co-signs this chart in the absence of a cardiologist.      The Ekg interpreted by me shows  sinus bradycardia, rate=57 with a rate of   Axis is   Left axis deviation  QTc is  normal  Intervals and Durations are unremarkable. ST Segments: no acute change    No significant change from prior EKG dated nov 9 2020        RADIOLOGY:   Non-plain film images such as CT, Ultrasound and MRI are read by the radiologist. Plain radiographic images are visualized and preliminarily interpreted by the emergency physician. Interpretation per the Radiologist below, if available at the time of this note:    XR CHEST PORTABLE   Final Result   Low lung volume portable chest x-ray with small areas of bibasilar   atelectasis versus pneumonia.                LABS:  Labs Reviewed   CBC WITH AUTO DIFFERENTIAL - Abnormal; Notable for the following components:       Result Value    RDW 11.7 (*)     MPV 11.5 (*)     Neutrophils Absolute 8.4 (*)     Lymphocytes Absolute 0.6 (*)     All other components within normal limits    Narrative:     Performed at:  [facility],  [place], [place] [facility]   Phone [phone]   COMPREHENSIVE METABOLIC PANEL W/ REFLEX TO MG FOR LOW K - Abnormal; Notable for the following components:    Sodium 128 (*)     Chloride 95 (*)     CO2 18 (*)     Glucose 434 (*)     BUN 35 (*)     ALT 95 (*)     AST 87 (*)     All other components within normal limits    Narrative:     Performed at:  [place] [address],  [place], [facility]   Phone [phone]   BASIC METABOLIC PANEL W/ REFLEX TO MG FOR LOW K - Abnormal; Notable for the following components:    Sodium 127 (*)     Chloride 96 (*)     CO2 19 (*)     Glucose 385 (*)     BUN 32 (*)     All other components within normal limits    Narrative:     Performed at:  [place] [address],  [place], [place] [facility]   Phone [phone]   POCT GLUCOSE - Abnormal; Notable for the following components:    POC Glucose 284 (*)     All other components within normal limits    Narrative:     Performed at:  [place] [address],  [place], [facility]   Phone [phone]   LIPASE    Narrative:     Performed at:  [place] [address],  [place], [place] [facility]   Phone [phone]   TROPONIN    Narrative:     Performed at:  [place] [address],  [place], [employer] [facility] [facility]   Phone [phone]   TROPONIN    Narrative:     Performed at:  [facility]) [place] [address],  [place], [employer] [facility] [facility]   Phone [phone]       All other labs were within normal range or not returned as of this dictation.     EMERGENCY DEPARTMENT COURSE and DIFFERENTIAL DIAGNOSIS/MDM:   Rosalee Fletcher is a 80 y.o. female who presents to the emergency department with the complaint of burning type sensation in the lower chest upper abdomen. Has been ongoing for the last week. Known Covid positive. Vital signs are stable here she is afebrile not requiring oxygen. Symptoms are unchanged patient is can no longer tolerated at home. Patient otherwise is a poor historian, denying history of GERD. Per chart review has a history of CAD last cardiac work-up was in 2018. Will check cardiac screening labs, EKG chest x-ray. Pseudohyponatremia with elevation in blood sugar in the mid 400s. Otherwise patient's lab work today are stable. We will plan to treat sugar with IV fluids, insulin, and recheck. Case was discussed with hospitalist service at this time as she is not requiring oxygen otherwise stable, hospitalist service recommending trending of troponins down here for possible discharge and outpatient follow-up. I do feel this is appropriate as patient is presenting with atypical type symptoms with known coronavirus infection. Blood sugar downtrending to less than 300, repeat troponin less than 0.01, no ischemic findings on EKG. Will discharge home             CRITICAL CARE TIME   Total Critical Care time was 0 minutes, excluding separately reportable procedures. There was a high probability of clinically significant/life threatening deterioration in the patient's condition which required my urgent intervention. Clinical concern   Intervention     CONSULTS:  IP CONSULT TO HOSPITALIST    PROCEDURES:  Unless otherwise noted below, none     Procedures        FINAL IMPRESSION      1. Fatigue, unspecified type    2. Chest pain, unspecified type    3. COVID-19    4.  Hyperglycemia          DISPOSITION/PLAN   DISPOSITION Decision To Discharge 11/16/2020 09:00:58 PM      PATIENT REFERRED TO:  [place] [facility] [place] [facility]  [address] [facility]  [phone]    If symptoms worsen    [address]   [phone]    Schedule an appointment as soon as possible for a visit in 2 days        DISCHARGE MEDICATIONS:  New Prescriptions    No medications on file     Controlled Substances Monitoring:     No flowsheet data found.     (Please note that portions of this note were completed with a voice recognition program.  Efforts were made to edit the dictations but occasionally words are mis-transcribed.)    Latrice Cueva DO (electronically signed)  Attending Emergency Physician            Latrice Cueva DO  11/16/20 3789

## 2020-11-16 NOTE — TELEPHONE ENCOUNTER
Writer contacted  ED provider to inform of 30 day readmission risk. Writer's attempt to contact ED provider was unsuccessful.

## 2020-11-16 NOTE — DISCHARGE SUMMARY
Name:  Manish Hickey  Room:  5113/2591-65  MRN:    [id number]    Discharge Summary      This discharge summary is in conjunction with a complete physical exam done on the day of discharge. Discharging Physician: Dr. Jade : 11/9/2020  Discharge:  11/11/2020    HPI taken from admission H&P:    The patient is a 80 y.o. female with PMH below, presents with \"skin burning\", diffuse myalgias, decreased sense of taste and smell, scratchy throat, non-productive cough. She reports that she has had these sx which have been progressive over the last week. She is very hard of hearing and it is difficult to collect hx from her although she appears oriented. Daughter reportedly told ER that she was concerned about pt's ability to care for herself at home. Pt lives alone. Diagnoses this Admission and Hospital Course     #COVID-19 infection  -Pulmonology consulted  -Monitored oxygen saturation- did not require supplemental O2   -Started on Decadron 6 mg D#2/10- complete on discharge      #Elevated LFTs  -Monitored  -Held statins     #Leukopenia  -Monitored white blood count     #Diabetes mellitus type 2  -Sliding scale insulin administered      #History of mild dementia  -Symptomatic management  - DC planner , discussed with family. They do not have any concerns . Pt is stable and lives independently .     # History of lung cancer with previous right lower lobe lobectomy     # Patient on chronic anticoagulation with Eliquis, etiology unclear     Procedures (Please Review Full Report for Details)  None     Consults    Pulmonology       Physical Exam at Discharge:    /87   Pulse 58   Temp 98 °F (36.7 °C) (Oral)   Resp 17   Ht 5' 2\" (1.575 m)   Wt 135 lb (61.2 kg)   SpO2 90%   BMI 24.69 kg/m²   General:  Awake, alert, NAD   oriented X3  Skin:  Warm and dry  Neck:  JVD absent. Neck supple  Chest:  Clear to auscultation, respiration easy. No wheezes, rales or rhonchi.    Cardiovascular:  RRR ,S1S2 normal  Abdomen:  Soft, non tender, non distended, BS +  Extremities:  No edema. Intact peripheral pulses. Brisk cap refill, < 2 secs  Neuro: non focal  Significant Diagnostic Studies:   Data:  CBC:        Recent Labs     11/09/20  1810 11/10/20  0653   WBC 3.5* 2.9*   RBC 4.11 3.76*   HGB 13.9 12.9   HCT 40.7 37.6   MCV 99.1 99.8   RDW 11.6* 11.5*   PLT 70* 66*      BMP:        Recent Labs     11/09/20  1810 11/10/20  0653    139   K 4.7 4.2    106   CO2 23 22   BUN 20 14   CREATININE 0.7 0.7      BNP: No results for input(s): BNP in the last 72 hours. PT/INR: No results for input(s): PROTIME, INR in the last 72 hours. APTT: No results for input(s): APTT in the last 72 hours.   CARDIAC ENZYMES:       Recent Labs     11/09/20 1810   TROPONINI <0.01      FASTING LIPID PANEL:        Lab Results   Component Value Date     CHOL 123 07/05/2018     HDL 36 (L) 07/05/2018     TRIG 176 (H) 07/05/2018      LIVER PROFILE:        Recent Labs     11/09/20  1810 11/10/20  0653   * 95*   * 86*   BILITOT 0.3 0.4   ALKPHOS 60 49            RADIOLOGY  XR CHEST PORTABLE   Final Result   Worsening interstitial markings in the right lung base             Discharge Medications     Medication List      START taking these medications    dexamethasone 4 MG tablet  Commonly known as:  Decadron  Take 1 tablet by mouth daily (with breakfast) for 8 days        CONTINUE taking these medications    atorvastatin 20 MG tablet  Commonly known as:  LIPITOR  Take 1 tablet by mouth nightly     B COMPLEX 100 PO     diclofenac sodium 1 % Gel  Commonly known as:  VOLTAREN  APPLY 4 G TOPICALLY 4 TIMES DAILY     Eliquis 2.5 MG Tabs tablet  Generic drug:  apixaban     esomeprazole Magnesium 20 MG Pack  Commonly known as:  NEXIUM     fenofibrate 145 MG tablet  Commonly known as:  TRICOR     Fluzone High-Dose 0.5 ML Adrienne injection  Generic drug:  Influenza Vac Split High-Dose     glimepiride 4 MG tablet  Commonly known as: AMARYL     Klor-Con 10 10 MEQ extended release tablet  Generic drug:  potassium chloride     LORazepam 0.5 MG tablet  Commonly known as:  ATIVAN     Mag-G 500 (27 Mg) MG Tabs tablet  Generic drug:  Magnesium Gluconate     therapeutic multivitamin-minerals tablet     TRUEtest Test strip  Generic drug:  blood glucose test strips     vitamin B-12 1000 MCG tablet  Commonly known as:  CYANOCOBALAMIN           Where to Get Your Medications      You can get these medications from any pharmacy    Bring a paper prescription for each of these medications  · dexamethasone 4 MG tablet           Discharged in stable condition to home  With Cj Viera    Follow Up:   Follow up with PCP in 1 week       Kevin Kelly MD  11/11/2020

## 2020-11-17 ENCOUNTER — TELEPHONE (OUTPATIENT)
Dept: OTHER | Facility: CLINIC | Age: 85
End: 2020-11-17

## 2020-11-17 ENCOUNTER — CARE COORDINATION (OUTPATIENT)
Dept: CARE COORDINATION | Age: 85
End: 2020-11-17

## 2020-11-17 ENCOUNTER — HOSPITAL ENCOUNTER (EMERGENCY)
Age: 85
Discharge: HOME | End: 2020-11-17
Attending: EMERGENCY MEDICINE
Payer: MEDICARE

## 2020-11-17 VITALS
SYSTOLIC BLOOD PRESSURE: 156 MMHG | HEART RATE: 53 BPM | HEIGHT: 65 IN | WEIGHT: 130 LBS | TEMPERATURE: 97.4 F | RESPIRATION RATE: 22 BRPM | BODY MASS INDEX: 21.66 KG/M2 | OXYGEN SATURATION: 95 % | DIASTOLIC BLOOD PRESSURE: 67 MMHG

## 2020-11-17 LAB
A/G RATIO: 1.1 (ref 1.1–2.2)
ALBUMIN SERPL-MCNC: 3.9 G/DL (ref 3.4–5)
ALP BLD-CCNC: 64 U/L (ref 40–129)
ALT SERPL-CCNC: 124 U/L (ref 10–40)
ANION GAP SERPL CALCULATED.3IONS-SCNC: 15 MMOL/L (ref 3–16)
AST SERPL-CCNC: 104 U/L (ref 15–37)
BASOPHILS ABSOLUTE: 0 K/UL (ref 0–0.2)
BASOPHILS RELATIVE PERCENT: 0.2 %
BILIRUB SERPL-MCNC: 0.7 MG/DL (ref 0–1)
BILIRUBIN URINE: NEGATIVE
BLOOD, URINE: NEGATIVE
BUN BLDV-MCNC: 34 MG/DL (ref 7–20)
CALCIUM SERPL-MCNC: 10 MG/DL (ref 8.3–10.6)
CHLORIDE BLD-SCNC: 93 MMOL/L (ref 99–110)
CLARITY: CLEAR
CO2: 21 MMOL/L (ref 21–32)
COLOR: YELLOW
CREAT SERPL-MCNC: 0.8 MG/DL (ref 0.6–1.2)
EOSINOPHILS ABSOLUTE: 0 K/UL (ref 0–0.6)
EOSINOPHILS RELATIVE PERCENT: 0 %
GFR AFRICAN AMERICAN: >60
GFR NON-AFRICAN AMERICAN: >60
GLOBULIN: 3.4 G/DL
GLUCOSE BLD-MCNC: 427 MG/DL (ref 70–99)
GLUCOSE URINE: >=1000 MG/DL
HCT VFR BLD CALC: 41.9 % (ref 36–48)
HEMOGLOBIN: 14.8 G/DL (ref 12–16)
KETONES, URINE: NEGATIVE MG/DL
LEUKOCYTE ESTERASE, URINE: NEGATIVE
LIPASE: 55 U/L (ref 13–60)
LYMPHOCYTES ABSOLUTE: 0.5 K/UL (ref 1–5.1)
LYMPHOCYTES RELATIVE PERCENT: 5.3 %
MCH RBC QN AUTO: 34.3 PG (ref 26–34)
MCHC RBC AUTO-ENTMCNC: 35.4 G/DL (ref 31–36)
MCV RBC AUTO: 96.9 FL (ref 80–100)
MICROSCOPIC EXAMINATION: ABNORMAL
MONOCYTES ABSOLUTE: 0.4 K/UL (ref 0–1.3)
MONOCYTES RELATIVE PERCENT: 3.9 %
NEUTROPHILS ABSOLUTE: 8.6 K/UL (ref 1.7–7.7)
NEUTROPHILS RELATIVE PERCENT: 90.6 %
NITRITE, URINE: NEGATIVE
PDW BLD-RTO: 11.4 % (ref 12.4–15.4)
PH UA: 5.5 (ref 5–8)
PLATELET # BLD: 135 K/UL (ref 135–450)
PMV BLD AUTO: 10.5 FL (ref 5–10.5)
POTASSIUM SERPL-SCNC: 4.6 MMOL/L (ref 3.5–5.1)
PRO-BNP: 641 PG/ML (ref 0–449)
PROTEIN UA: NEGATIVE MG/DL
RBC # BLD: 4.32 M/UL (ref 4–5.2)
SODIUM BLD-SCNC: 129 MMOL/L (ref 136–145)
SPECIFIC GRAVITY UA: 1.02 (ref 1–1.03)
TOTAL PROTEIN: 7.3 G/DL (ref 6.4–8.2)
TROPONIN: <0.01 NG/ML
URINE TYPE: ABNORMAL
UROBILINOGEN, URINE: 0.2 E.U./DL
WBC # BLD: 9.5 K/UL (ref 4–11)

## 2020-11-17 PROCEDURE — 81003 URINALYSIS AUTO W/O SCOPE: CPT

## 2020-11-17 PROCEDURE — 2580000003 HC RX 258: Performed by: PHYSICIAN ASSISTANT

## 2020-11-17 PROCEDURE — 85025 COMPLETE CBC W/AUTO DIFF WBC: CPT

## 2020-11-17 PROCEDURE — 80053 COMPREHEN METABOLIC PANEL: CPT

## 2020-11-17 PROCEDURE — 93005 ELECTROCARDIOGRAM TRACING: CPT | Performed by: EMERGENCY MEDICINE

## 2020-11-17 PROCEDURE — 99284 EMERGENCY DEPT VISIT MOD MDM: CPT

## 2020-11-17 PROCEDURE — 6370000000 HC RX 637 (ALT 250 FOR IP): Performed by: PHYSICIAN ASSISTANT

## 2020-11-17 PROCEDURE — 83690 ASSAY OF LIPASE: CPT

## 2020-11-17 PROCEDURE — 83880 ASSAY OF NATRIURETIC PEPTIDE: CPT

## 2020-11-17 PROCEDURE — 84484 ASSAY OF TROPONIN QUANT: CPT

## 2020-11-17 RX ORDER — 0.9 % SODIUM CHLORIDE 0.9 %
1000 INTRAVENOUS SOLUTION INTRAVENOUS ONCE
Status: COMPLETED | OUTPATIENT
Start: 2020-11-17 | End: 2020-11-17

## 2020-11-17 RX ORDER — FAMOTIDINE 10 MG
10 TABLET ORAL 2 TIMES DAILY
Qty: 60 TABLET | Refills: 0 | Status: SHIPPED | OUTPATIENT
Start: 2020-11-17 | End: 2022-03-03

## 2020-11-17 RX ADMIN — LIDOCAINE HYDROCHLORIDE: 20 SOLUTION ORAL; TOPICAL at 19:06

## 2020-11-17 RX ADMIN — SODIUM CHLORIDE 1000 ML: 9 INJECTION, SOLUTION INTRAVENOUS at 19:44

## 2020-11-17 ASSESSMENT — PAIN DESCRIPTION - PAIN TYPE: TYPE: ACUTE PAIN

## 2020-11-17 ASSESSMENT — PAIN SCALES - GENERAL: PAINLEVEL_OUTOF10: 7

## 2020-11-17 ASSESSMENT — PAIN DESCRIPTION - LOCATION: LOCATION: CHEST

## 2020-11-17 ASSESSMENT — PAIN DESCRIPTION - DESCRIPTORS: DESCRIPTORS: BURNING

## 2020-11-17 NOTE — CARE COORDINATION
Patient contacted regarding [id number] diagnosis\". Discussed COVID-19 related testing which was available at this time. Test results were positive. Patient informed of results, if available? Yes; 11/10/20    Care Transition Nurse/ Ambulatory Care Manager contacted the family by telephone to perform post discharge assessment. Call within 2 business days of discharge: Yes. Verified name and  with family as identifiers. Provided introduction to self, and explanation of the CTN/ACM role, and reason for call due to risk factors for infection and/or exposure to COVID-19. Spoke with her daughter/POA, Jimbo Payton. She was very displeased that her mom was not readmitted to the hospital yesterday. She states that she lives alone and is getting weaker. She states that she was c/o SOB yesterday and called her PCP, who then called 911. She wanted to go to [facility], but the squad would only take her to [place] because it was closer. Jimbo Payton states that she just got off the phone with her mom and that she was drinking an Ensure. Appetite is poor. She is going to call her mom after she gets off work to see how she is feeling. If things have not improved, so plans to drive her to [facility]. Symptoms reviewed with family who verbalized the following symptoms: fatigue and shortness of breath. Due to non- [facility] PCP encounter was not routed to provider for escalation. Discussed follow-up appointments.  If no appointment was previously scheduled, appointment scheduling offered: [facility] follow up appointment(s):   Future Appointments   Date Time Provider J Luis Allen   2021 11:10 AM DO JUANJO Hahn [facility] follow up appointment(s): PCP in [facility] provided:  Education of patient/family/caregiver/guardian to support self-management-supportive care     Advance Care Planning:   Does patient have an Advance Directive:  Durable POA is on file; no living

## 2020-11-17 NOTE — ED NOTES
Discharge instructions given, pt verbalized understanding. Discussed follow-up appointments and medications. No questions or concerns at this time. Pt taken out of ER via wheelchair. Pt discharged with no prescriptions.       Whit Sawyer RN  11/16/20 1585

## 2020-11-17 NOTE — ED PROVIDER NOTES
[facility] Emergency Department    CHIEF COMPLAINT  Chest Pain (patient reporting a positive covid test x 15 days ago. she presents with symptoms of \"burning in my chest\" patient was seen at [facility] yesterday and reports \"they sent me home\" patient states she lives alone and wondes why she was sent home. )      SHARED SERVICE VISIT  Evaluated by KELI. My supervising physician was available for consultation. EKG interpretation provided by attending physician. HISTORY OF PRESENT ILLNESS  Eva Gong is a 80 y.o. female who presents to the ED complaining of burning epigastric pain. Patient brought in by her daughter today for evaluation. Patient states that she was seen evaluated at local hospital yesterday for some chest discomfort and shortness of breath as well as burning in abdomen. Reports that she was checked out and then sent home. She reports ongoing symptoms. Her pain is burning in epigastric area associated with some slight nausea. She denies any vomiting diarrhea. She does feel short of breath. No pain with deep breaths. No cough or congestion. Did test positive for Covid over 15 days ago. She denies headache, lightheadedness, dizziness or confusion. No urinary symptoms. No other complaints, modifying factors or associated symptoms. Nursing notes reviewed.    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 Social History     Socioeconomic History    Marital status:       Spouse name: Not on file    Number of children: Not on file    Years of education: Not on file    Highest education level: Not on file   Occupational History    Occupation: retired    Social Needs    Financial resource strain: Not on file    Food insecurity     Worry: Not on file     Inability: Not on file   [employer] needs     Medical: Not on file     Non-medical: Not on file   Tobacco Use    Smoking status: Never Smoker    Smokeless tobacco: Never Used   Substance and Sexual Activity    Alcohol use: No     Alcohol/week: 0.0 standard drinks    Drug use: No    Sexual activity: Not Currently     Partners: Male   Lifestyle    Physical activity     Days per week: Not on file     Minutes per session: Not on file    Stress: Not on file   Relationships    Social connections     Talks on phone: Not on file     Gets together: Not on file     Attends Scientologist service: Not on file     Active member of club or organization: Not on file     Attends meetings of clubs or organizations: Not on file     Relationship status: Not on file    Intimate partner violence     Fear of current or ex partner: Not on file     Emotionally abused: Not on file     Physically abused: Not on file     Forced sexual activity: Not on file   Other Topics Concern    Not on file   Social History Narrative    Not on file     Current Facility-Administered Medications   Medication Dose Route Frequency Provider Last Rate Last Dose    aluminum & magnesium hydroxide-simethicone (MAALOX) 30 mL, lidocaine viscous hcl (XYLOCAINE) 5 mL (GI COCKTAIL)   Oral Once RATNA Rivera         Current Outpatient Medications   Medication Sig Dispense Refill    dexamethasone (DECADRON) 4 MG tablet Take 1 tablet by mouth daily (with breakfast) for 8 days 8 tablet 0    diclofenac sodium (VOLTAREN) 1 % GEL APPLY 4 G TOPICALLY 4 TIMES DAILY 500 g 1    ELIQUIS 2.5 MG TABS tablet       Influenza Vac Split High-Dose (FLUZONE HIGH-DOSE) 0.5 ML ROSIBEL injection Fluzone High-Dose 4807-0777 (PF) 180 mcg/0.5 mL intramuscular syringe   TO BE ADMINISTERED BY PHARMACIST FOR IMMUNIZATION      fenofibrate (TRICOR) 145 MG tablet fenofibrate nanocrystallized 145 mg tablet      atorvastatin (LIPITOR) 20 MG tablet Take 1 tablet by mouth nightly 30 tablet 1    LORazepam (ATIVAN) 0.5 MG tablet Take 0.5 mg by mouth 2 times daily. .      glimepiride (AMARYL) 4 MG tablet Take 4 mg by mouth daily as needed Take before breakfast for blood sugar greater than 200.  esomeprazole Magnesium (NEXIUM) 20 MG PACK Take 20 mg by mouth daily      Multiple Vitamins-Minerals (THERAPEUTIC MULTIVITAMIN-MINERALS) tablet Take 1 tablet by mouth daily      B Complex Vitamins (B COMPLEX 100 PO) Take by mouth      TRUETEST TEST strip   10    MAG-G 500 (27 MG) MG TABS tablet   6    KLOR-CON 10 10 MEQ tablet       vitamin B-12 (CYANOCOBALAMIN) 1000 MCG tablet Take 1,000 mcg by mouth daily. Allergies   Allergen Reactions    Other      Surgical tape-burned skin,   Surgical tape-burned skin,     Penicillins      Yeast infection    Polysporin [Bacitracin-Polymyxin B] Hives    Adhesive Tape Rash       REVIEW OF SYSTEMS  10 systems reviewed, pertinent positives per HPI otherwise noted to be negative    PHYSICAL EXAM  BP (!) 175/63   Pulse 56   Temp 97.4 °F (36.3 °C) (Oral)   Resp 20   Ht 5' 5\" (1.651 m)   Wt 130 lb (59 kg)   SpO2 95%   BMI 21.63 kg/m²   GENERAL APPEARANCE: Awake and alert. Cooperative. No acute distress. HEAD: Normocephalic. Atraumatic. EYES: PERRL. EOM's grossly intact. ENT: Mucous membranes are moistno JVD. NECK: Supple. No tracheal tenderness or deviation. No crepitus. Sachi Money HEART: RRR. No murmurs. No chest wall tenderness. LUNGS: Respirations unlabored. CTAB. Good air exchange. Speaking comfortably in full sentences. No wheezing, rhonchi, rales. ABDOMEN: Soft. Non-distended. Epigastric discomfort without rigidity, guarding or rebound. Negative Guzman's, McBurney's and Rovsing's. No fluid waves or ascites. No hernias or masses. Bowel sounds normal quadrants. No CVA tenderness. EXTREMITIES: No peripheral edema. No unilateral calf pain, redness or swelling. Moves all extremities equally. All extremities neurovascularly intact. SKIN: Warm and dry. No acute rashes. NEUROLOGICAL: Alert and oriented. CN's 2-12 intact. No gross facial drooping. Strength 5/5, sensation intact. PSYCHIATRIC: Normal mood and affect. RADIOLOGY  Xr Chest (2 Vw)    Result Date: 11/6/2020  EXAMINATION: TWO XRAY VIEWS OF THE CHEST 11/6/2020 3:03 pm COMPARISON: June 28, 2020 September 12, 2019 HISTORY: ORDERING SYSTEM PROVIDED HISTORY: PAIN TECHNOLOGIST PROVIDED HISTORY: Reason for exam:->PAIN Reason for Exam: Back pain Acuity: Acute Type of Exam: Initial FINDINGS: Stable left upper lobe nodule. Small bibasilar lung opacities are unchanged and likely scarring. No focal consolidation. No cardiomegaly. No pulmonary edema. No acute findings. No change. Ct Abdomen Pelvis W Iv Contrast Additional Contrast? Iv    Result Date: 11/6/2020  EXAMINATION: CTA OF THE CHEST; CT OF THE ABDOMEN AND PELVIS WITH CONTRAST 11/6/2020 4:35 pm TECHNIQUE: CTA of the chest was performed after the administration of intravenous contrast.  Multiplanar reformatted images are provided for review. MIP images are provided for review. Dose modulation, iterative reconstruction, and/or weight based adjustment of the mA/kV was utilized to reduce the radiation dose to as low as reasonably achievable.; CT of the abdomen and pelvis was performed with the administration of intravenous contrast. Multiplanar reformatted images are provided for review. Dose modulation, iterative reconstruction, and/or weight based adjustment of the mA/kV was utilized to reduce the radiation dose to as low as reasonably achievable. COMPARISON: Chest radiograph today. CT abdomen and pelvis 05/12/2019. chest CT 07/04/2018 HISTORY: ORDERING SYSTEM PROVIDED HISTORY: Severe back pain in the midthoracic area (possible dissection versus pulmonary etiology TECHNOLOGIST PROVIDED HISTORY: Reason for exam:->Severe back pain in the midthoracic area (possible dissection versus pulmonary etiology Reason for Exam: Severe thoracic back pain Acuity: Acute Type of Exam: Initial; ORDERING SYSTEM PROVIDED HISTORY: abd pain TECHNOLOGIST PROVIDED HISTORY: Additional Contrast?->IV Reason for exam:->abd pain Reason for Exam: Severe thoracic back pain Acuity: Acute Type of Exam: Initial FINDINGS: CHEST CT: Pulmonary Arteries: Pulmonary arteries are adequately opacified for evaluation. No evidence of intraluminal filling defect to suggest pulmonary embolism. Main pulmonary artery is normal in caliber. Mediastinum: Sequela of old granulomatous disease. No enlarged or suspicious-appearing lymph nodes. The heart and pericardium demonstrate no acute abnormality. There is no acute abnormality of the thoracic aorta. Lungs/pleura: Sequela of old granulomatous disease. Few small noncalcified nodules are present as well measuring up to 6 mm in the right upper lobe on axial image 74, stable since at least 2018 compatible with a benign process. Mild emphysematous disease. No consolidation, evidence for edema or effusion. The central airway is patent. Soft Tissues/Bones: Old bilateral rib fractures. No acute fracture identified. No soft tissue hematoma. ABDOMEN PELVIS: Organs: Status post cholecystectomy. The liver, pancreas, spleen, adrenals and kidneys reveal no acute findings. GI/Bowel: There is no bowel dilatation or wall thickening identified. Diverticulosis. Pelvis: No acute findings.   Trace amount of reconstruction, and/or weight based adjustment of the mA/kV was utilized to reduce the radiation dose to as low as reasonably achievable.; CT of the abdomen and pelvis was performed with the administration of intravenous contrast. Multiplanar reformatted images are provided for review. Dose modulation, iterative reconstruction, and/or weight based adjustment of the mA/kV was utilized to reduce the radiation dose to as low as reasonably achievable. COMPARISON: Chest radiograph today. CT abdomen and pelvis 05/12/2019. chest CT 07/04/2018 HISTORY: ORDERING SYSTEM PROVIDED HISTORY: Severe back pain in the midthoracic area (possible dissection versus pulmonary etiology TECHNOLOGIST PROVIDED HISTORY: Reason for exam:->Severe back pain in the midthoracic area (possible dissection versus pulmonary etiology Reason for Exam: Severe thoracic back pain Acuity: Acute Type of Exam: Initial; ORDERING SYSTEM PROVIDED HISTORY: abd pain TECHNOLOGIST PROVIDED HISTORY: Additional Contrast?->IV Reason for exam:->abd pain Reason for Exam: Severe thoracic back pain Acuity: Acute Type of Exam: Initial FINDINGS: CHEST CT: Pulmonary Arteries: Pulmonary arteries are adequately opacified for evaluation. No evidence of intraluminal filling defect to suggest pulmonary embolism. Main pulmonary artery is normal in caliber. Mediastinum: Sequela of old granulomatous disease. No enlarged or suspicious-appearing lymph nodes. The heart and pericardium demonstrate no acute abnormality. There is no acute abnormality of the thoracic aorta. Lungs/pleura: Sequela of old granulomatous disease. Few small noncalcified nodules are present as well measuring up to 6 mm in the right upper lobe on axial image 74, stable since at least 2018 compatible with a benign process. Mild emphysematous disease. No consolidation, evidence for edema or effusion. The central airway is patent. Soft Tissues/Bones: Old bilateral rib fractures.   No acute fracture identified. No soft tissue hematoma. ABDOMEN PELVIS: Organs: Status post cholecystectomy. The liver, pancreas, spleen, adrenals and kidneys reveal no acute findings. GI/Bowel: There is no bowel dilatation or wall thickening identified. Diverticulosis. Pelvis: No acute findings. Trace amount of intraluminal gas within the bladder, suggestive of recent instrumentation. Peritoneum/Retroperitoneum: No free air or free fluid. The aorta is normal in caliber. The visceral branches are patent. No lymphadenopathy. Bones/Soft Tissues: No abnormality identified. 1.  No evidence for acute pulmonary embolism. Chronic findings in the chest including sequela of old granulomatous disease. Old bilateral rib fractures. 2.  No acute abnormality identified in the abdomen or pelvis. ED COURSE  Patient received GI cocktail for pain, with good relief. Triage vitals stable. EKG sinus bradycardia at a rate of 57 without evidence for acute ischemic change. Given a 1 L IV fluid bolus. Urinalysis without evidence for infectious process or dehydration. CBC without leukocytosis or anemia. CMP consistent with some mild dehydration although the sodium may be falsely decreased secondary to slightly elevated blood sugar. There is no anion gap. Troponin less than 0.01. BNP was negative and lipase 55. On reevaluation patient reports that she is feeling better and would at this time like to go home. She is requesting that we call her daughter for a ride. Nursing discussed plan of care with family. Have discussed return precautions otherwise and patient in agreement and comfortable at discharge. A discussion was had with MsHaylee Randy Shine regarding burning epigastric discomfort, ED findings and recommendations for follow-up. Risk management discussed and shared decision making had with patient and/or surrogate. All questions were answered. Patient will follow up with PCP and GI within 1 week for further evaluation/treatment. All questions answered. Patient will return to ED for new/worsening symptoms. Patient was sent home with a prescription for Pepcid and informed to continue home medications as prescribed.     MDM  Results for orders placed or performed during the hospital encounter of 11/17/20   CBC auto differential   Result Value Ref Range    WBC 9.5 4.0 - 11.0 K/uL    RBC 4.32 4.00 - 5.20 M/uL    Hemoglobin 14.8 12.0 - 16.0 g/dL    Hematocrit 41.9 36.0 - 48.0 %    MCV 96.9 80.0 - 100.0 fL    MCH 34.3 (H) 26.0 - 34.0 pg    MCHC 35.4 31.0 - 36.0 g/dL    RDW 11.4 (L) 12.4 - 15.4 %    Platelets 187 964 - 589 K/uL    MPV 10.5 5.0 - 10.5 fL    Neutrophils % 90.6 %    Lymphocytes % 5.3 %    Monocytes % 3.9 %    Eosinophils % 0.0 %    Basophils % 0.2 %    Neutrophils Absolute 8.6 (H) 1.7 - 7.7 K/uL    Lymphocytes Absolute 0.5 (L) 1.0 - 5.1 K/uL    Monocytes Absolute 0.4 0.0 - 1.3 K/uL    Eosinophils Absolute 0.0 0.0 - 0.6 K/uL    Basophils Absolute 0.0 0.0 - 0.2 K/uL   Comprehensive metabolic panel   Result Value Ref Range    Sodium 129 (L) 136 - 145 mmol/L    Potassium 4.6 3.5 - 5.1 mmol/L    Chloride 93 (L) 99 - 110 mmol/L    CO2 21 21 - 32 mmol/L    Anion Gap 15 3 - 16    Glucose 427 (H) 70 - 99 mg/dL    BUN 34 (H) 7 - 20 mg/dL    CREATININE 0.8 0.6 - 1.2 mg/dL    GFR Non-African American >60 >60    GFR African American >60 >60    Calcium 10.0 8.3 - 10.6 mg/dL    Total Protein 7.3 6.4 - 8.2 g/dL    Alb 3.9 3.4 - 5.0 g/dL    Albumin/Globulin Ratio 1.1 1.1 - 2.2    Total Bilirubin 0.7 0.0 - 1.0 mg/dL    Alkaline Phosphatase 64 40 - 129 U/L     (H) 10 - 40 U/L     (H) 15 - 37 U/L    Globulin 3.4 g/dL   Troponin   Result Value Ref Range    Troponin <0.01 <0.01 ng/mL   Brain Natriuretic Peptide   Result Value Ref Range    Pro- (H) 0 - 449 pg/mL   Urinalysis   Result Value Ref Range    Color, UA Yellow Straw/Yellow    Clarity, UA Clear Clear    Glucose, Ur >=1000 (A) Negative mg/dL    Bilirubin Urine Negative Negative    Ketones, Urine Negative Negative mg/dL    Specific Gravity, UA 1.025 1.005 - 1.030    Blood, Urine Negative Negative    pH, UA 5.5 5.0 - 8.0    Protein, UA Negative Negative mg/dL    Urobilinogen, Urine 0.2 <2.0 E.U./dL    Nitrite, Urine Negative Negative    Leukocyte Esterase, Urine Negative Negative    Microscopic Examination Not Indicated     Urine Type NotGiven    Lipase   Result Value Ref Range    Lipase 55.0 13.0 - 60.0 U/L   EKG 12 Lead   Result Value Ref Range    Ventricular Rate 57 BPM    Atrial Rate 57 BPM    P-R Interval 182 ms    QRS Duration 134 ms    Q-T Interval 486 ms    QTc Calculation (Bazett) 473 ms    P Axis 78 degrees    R Axis -43 degrees    T Axis 114 degrees    Diagnosis       Sinus bradycardia with sinus arrhythmiaLeft axis deviationLeft bundle branch blockAbnormal ECGWhen compared with ECG of 16-NOV-2020 16:18,No significant change was found     I estimate there is LOW risk for ACUTE CORONARY SYNDROME, INTRACRANIAL HEMORRHAGE, MALIGNANT DYSRHYTHMIA or HYPERTENSION, PULMONARY EMBOLISM, SEPSIS, SUBARACHNOID HEMORRHAGE, SUBDURAL HEMATOMA, STROKE, or THORACIC AORTIC DISSECTION, thus I consider the discharge disposition reasonable. Estefanía John and I have discussed the diagnosis and risks, and we agree with discharging home to follow-up with their primary doctor. We also discussed returning to the Emergency Department immediately if new or worsening symptoms occur. We have discussed the symptoms which are most concerning (e.g., bloody sputum, fever, worsening pain or shortness of breath, vomiting, weakness) that necessitate immediate return. Final Impression  1. Abdominal pain, epigastric      Blood pressure (!) 156/67, pulse 53, temperature 97.4 °F (36.3 °C), temperature source Oral, resp. rate 22, height 5' 5\" (1.651 m), weight 130 lb (59 kg), SpO2 95 %, not currently breastfeeding. DISPOSITION  Patient was discharged to home in good condition.          Jade Montoya,

## 2020-11-18 ENCOUNTER — CARE COORDINATION (OUTPATIENT)
Dept: CASE MANAGEMENT | Age: 85
End: 2020-11-18

## 2020-11-18 ENCOUNTER — TELEPHONE (OUTPATIENT)
Dept: OTHER | Facility: CLINIC | Age: 85
End: 2020-11-18

## 2020-11-18 LAB
EKG ATRIAL RATE: 57 BPM
EKG DIAGNOSIS: NORMAL
EKG P AXIS: 78 DEGREES
EKG P-R INTERVAL: 182 MS
EKG Q-T INTERVAL: 486 MS
EKG QRS DURATION: 134 MS
EKG QTC CALCULATION (BAZETT): 473 MS
EKG R AXIS: -43 DEGREES
EKG T AXIS: 114 DEGREES
EKG VENTRICULAR RATE: 57 BPM

## 2020-11-18 PROCEDURE — 93010 ELECTROCARDIOGRAM REPORT: CPT | Performed by: INTERNAL MEDICINE

## 2020-11-18 NOTE — ED NOTES
Pt ambulated to bedside commode with steady gait, without use of assistive devices. Pt denies pain, lightheadedness or dizziness.       Bruce Lizama RN  11/17/20 2020

## 2020-11-18 NOTE — CARE COORDINATION
Ambulatory Care Manager contacted the patient by telephone to perform follow-up assessment. Verified name and  with patient as identifiers. Patient has following risk factors of: pneumonia, diabetes and HTN and Age. Attempted to contact patient's daughter for further f/u; left HIPPA compliant message and [facility] contact information to return call    Patient contacted regarding COVID-19 risk and screening. Discussed COVID-19 related testing which was available at this time. Patient tested positive for COVID-19 on 2020. Patient informed of results, if available? Patient verbalized awareness of positive result. Symptoms reviewed with patient who verbalized the following symptoms: fatigue, no new symptoms, no worsening symptoms and patient reports abdominal pain resolving and she is \"feeling much better today\". Due to no new or worsening symptoms encounter was not routed to provider for escalation. Education provided regarding infection prevention, and signs and symptoms of COVID-19 and when to seek medical attention with patient who verbalized understanding. Discussed exposure protocols and quarantine from [address] you at higher risk for severe illness  and given an opportunity for questions and concerns. The patient agrees to contact the PCP office for questions related to their healthcare. [facility] provided contact information for future reference. From CDC: Are you at higher risk for severe illness?  Wash your hands often.  Avoid close contact (6 feet, which is about two arm lengths) with people who are sick.  Put distance between yourself and other people if COVID-19 is spreading in your community.  Clean and disinfect frequently touched surfaces.  Avoid all cruise travel and non-essential air travel.  Call your healthcare professional if you have concerns about COVID-19 and your underlying condition or if you are sick.     For more information on steps you can take to protect yourself, see CDC's How to Protect Yourself      Pt will be further monitored by COVID Loop Team based on symptoms and risk factors.

## 2020-11-18 NOTE — TELEPHONE ENCOUNTER
RN access attempted to contact daughter Charles Sharma on behalf of patient to inform about ed follow up appt. Was unable to contact zaheer.      Left message with appt date and time    appt 11/25/20 @ 9005

## 2020-11-18 NOTE — ED NOTES
Pt resting comfortably in bed at this time with unlabored breathing. Pt denies any trouble breathing.       Tato Estrada RN  11/17/20 1920

## 2020-11-18 NOTE — TELEPHONE ENCOUNTER
RN access contacted [facility] office to schedule ed follow up appt    appt scheduled for 11/25/20 @ 7090    Will contact pt to inform or appt date and time

## 2020-11-18 NOTE — ED NOTES
Pt daughter updated that pt will be discharged home. Daughter okay to come and get pt.       Roman Lemus RN  11/17/20 2018

## 2020-11-18 NOTE — TELEPHONE ENCOUNTER
Writer contacted Dr. Aby Bishop to inform of 30 day readmission risk. There is no decision on disposition at this time.   has not meet with pt yet

## 2020-11-18 NOTE — ED NOTES
Pt scores as fall risk. Pt fall band on, bed alarm on, call light within reach, non skid socks, and fall sign outside door. Pt alert and oriented.       Keerthi Cruz RN  11/17/20 2065

## 2020-11-18 NOTE — TELEPHONE ENCOUNTER
RN access contacted pt to inform about ed follow up appt.     Patient gave permission to call daughter Sherley Forde to inform about the appt date and time

## 2020-11-20 ENCOUNTER — CARE COORDINATION (OUTPATIENT)
Dept: CARE COORDINATION | Age: 85
End: 2020-11-20

## 2020-11-20 NOTE — CARE COORDINATION
Attempted outreach call to her daughter/POA, Dagmar Brisenos; left a VM with ACM call-back information. Future Appointments   Date Time Provider J Luis Allen   2/9/2021 11:10 AM DO JUANJO Clark       [facility] MSN, RN  Ambulatory Care Manager  [phone]  [email or web address]

## 2020-11-24 ENCOUNTER — TELEPHONE (OUTPATIENT)
Dept: OTHER | Facility: CLINIC | Age: 85
End: 2020-11-24

## 2020-11-24 ENCOUNTER — APPOINTMENT (OUTPATIENT)
Dept: CT IMAGING | Age: 85
DRG: 177 | End: 2020-11-24
Payer: MEDICARE

## 2020-11-24 ENCOUNTER — HOSPITAL ENCOUNTER (INPATIENT)
Age: 85
LOS: 3 days | Discharge: NURSING, REHAB OR LONG-TERM CARE | DRG: 177 | End: 2020-11-27
Attending: EMERGENCY MEDICINE | Admitting: INTERNAL MEDICINE
Payer: MEDICARE

## 2020-11-24 ENCOUNTER — APPOINTMENT (OUTPATIENT)
Dept: GENERAL RADIOLOGY | Age: 85
DRG: 177 | End: 2020-11-24
Payer: MEDICARE

## 2020-11-24 ENCOUNTER — CARE COORDINATION (OUTPATIENT)
Dept: CARE COORDINATION | Age: 85
End: 2020-11-24

## 2020-11-24 PROBLEM — R62.7 FAILURE TO THRIVE IN ADULT: Status: ACTIVE | Noted: 2020-11-24

## 2020-11-24 LAB
A/G RATIO: 0.8 (ref 1.1–2.2)
ALBUMIN SERPL-MCNC: 3.2 G/DL (ref 3.4–5)
ALP BLD-CCNC: 66 U/L (ref 40–129)
ALT SERPL-CCNC: 105 U/L (ref 10–40)
ANION GAP SERPL CALCULATED.3IONS-SCNC: 11 MMOL/L (ref 3–16)
ANION GAP SERPL CALCULATED.3IONS-SCNC: 13 MMOL/L (ref 3–16)
AST SERPL-CCNC: 76 U/L (ref 15–37)
ATYPICAL LYMPHOCYTE RELATIVE PERCENT: 1 % (ref 0–6)
BACTERIA: ABNORMAL /HPF
BANDED NEUTROPHILS RELATIVE PERCENT: 1 % (ref 0–7)
BASE EXCESS VENOUS: -1.5 MMOL/L (ref -3–3)
BASOPHILS ABSOLUTE: 0 K/UL (ref 0–0.2)
BASOPHILS RELATIVE PERCENT: 0 %
BILIRUB SERPL-MCNC: 0.6 MG/DL (ref 0–1)
BILIRUBIN URINE: NEGATIVE
BLOOD, URINE: ABNORMAL
BUN BLDV-MCNC: 16 MG/DL (ref 7–20)
BUN BLDV-MCNC: 20 MG/DL (ref 7–20)
CALCIUM SERPL-MCNC: 8.8 MG/DL (ref 8.3–10.6)
CALCIUM SERPL-MCNC: 9.5 MG/DL (ref 8.3–10.6)
CARBOXYHEMOGLOBIN: 1.8 % (ref 0–1.5)
CHLORIDE BLD-SCNC: 101 MMOL/L (ref 99–110)
CHLORIDE BLD-SCNC: 102 MMOL/L (ref 99–110)
CLARITY: ABNORMAL
CO2: 21 MMOL/L (ref 21–32)
CO2: 22 MMOL/L (ref 21–32)
COLOR: YELLOW
CREAT SERPL-MCNC: 0.7 MG/DL (ref 0.6–1.2)
CREAT SERPL-MCNC: 0.7 MG/DL (ref 0.6–1.2)
EKG ATRIAL RATE: 91 BPM
EKG DIAGNOSIS: NORMAL
EKG P AXIS: 92 DEGREES
EKG P-R INTERVAL: 184 MS
EKG Q-T INTERVAL: 398 MS
EKG QRS DURATION: 134 MS
EKG QTC CALCULATION (BAZETT): 489 MS
EKG R AXIS: -48 DEGREES
EKG T AXIS: 132 DEGREES
EKG VENTRICULAR RATE: 91 BPM
EOSINOPHILS ABSOLUTE: 0.1 K/UL (ref 0–0.6)
EOSINOPHILS RELATIVE PERCENT: 1 %
EPITHELIAL CELLS, UA: ABNORMAL /HPF (ref 0–5)
GFR AFRICAN AMERICAN: >60
GFR AFRICAN AMERICAN: >60
GFR NON-AFRICAN AMERICAN: >60
GFR NON-AFRICAN AMERICAN: >60
GLOBULIN: 3.8 G/DL
GLUCOSE BLD-MCNC: 131 MG/DL (ref 70–99)
GLUCOSE BLD-MCNC: 135 MG/DL (ref 70–99)
GLUCOSE BLD-MCNC: 180 MG/DL (ref 70–99)
GLUCOSE URINE: 100 MG/DL
HCO3 VENOUS: 21.4 MMOL/L (ref 23–29)
HCT VFR BLD CALC: 43.3 % (ref 36–48)
HEMOGLOBIN: 14.8 G/DL (ref 12–16)
KETONES, URINE: NEGATIVE MG/DL
LEUKOCYTE ESTERASE, URINE: ABNORMAL
LYMPHOCYTES ABSOLUTE: 1.6 K/UL (ref 1–5.1)
LYMPHOCYTES RELATIVE PERCENT: 13 %
MCH RBC QN AUTO: 33.7 PG (ref 26–34)
MCHC RBC AUTO-ENTMCNC: 34.1 G/DL (ref 31–36)
MCV RBC AUTO: 98.7 FL (ref 80–100)
METHEMOGLOBIN VENOUS: 0.3 %
MICROSCOPIC EXAMINATION: YES
MONOCYTES ABSOLUTE: 0.7 K/UL (ref 0–1.3)
MONOCYTES RELATIVE PERCENT: 6 %
NEUTROPHILS ABSOLUTE: 8.8 K/UL (ref 1.7–7.7)
NEUTROPHILS RELATIVE PERCENT: 78 %
NITRITE, URINE: NEGATIVE
O2 CONTENT, VEN: 21 VOL %
O2 SAT, VEN: 99 %
O2 THERAPY: ABNORMAL
PCO2, VEN: 31.5 MMHG (ref 40–50)
PDW BLD-RTO: 12.2 % (ref 12.4–15.4)
PERFORMED ON: ABNORMAL
PH UA: 5 (ref 5–8)
PH VENOUS: 7.45 (ref 7.35–7.45)
PLATELET # BLD: 126 K/UL (ref 135–450)
PLATELET SLIDE REVIEW: ABNORMAL
PMV BLD AUTO: 11.3 FL (ref 5–10.5)
PO2, VEN: 167.4 MMHG (ref 25–40)
POTASSIUM REFLEX MAGNESIUM: 5.2 MMOL/L (ref 3.5–5.1)
POTASSIUM SERPL-SCNC: 4.1 MMOL/L (ref 3.5–5.1)
PRO-BNP: 271 PG/ML (ref 0–449)
PROTEIN UA: ABNORMAL MG/DL
RBC # BLD: 4.39 M/UL (ref 4–5.2)
RBC UA: ABNORMAL /HPF (ref 0–4)
SARS-COV-2, NAAT: NOT DETECTED
SLIDE REVIEW: ABNORMAL
SODIUM BLD-SCNC: 135 MMOL/L (ref 136–145)
SODIUM BLD-SCNC: 135 MMOL/L (ref 136–145)
SPECIFIC GRAVITY UA: >=1.03 (ref 1–1.03)
TCO2 CALC VENOUS: 22 MMOL/L
TOTAL PROTEIN: 7 G/DL (ref 6.4–8.2)
TROPONIN: <0.01 NG/ML
URINE REFLEX TO CULTURE: YES
URINE TYPE: ABNORMAL
UROBILINOGEN, URINE: 0.2 E.U./DL
WBC # BLD: 11.2 K/UL (ref 4–11)
WBC UA: >100 /HPF (ref 0–5)

## 2020-11-24 PROCEDURE — 81001 URINALYSIS AUTO W/SCOPE: CPT

## 2020-11-24 PROCEDURE — 85025 COMPLETE CBC W/AUTO DIFF WBC: CPT

## 2020-11-24 PROCEDURE — 2580000003 HC RX 258: Performed by: PHYSICIAN ASSISTANT

## 2020-11-24 PROCEDURE — 87186 SC STD MICRODIL/AGAR DIL: CPT

## 2020-11-24 PROCEDURE — 83880 ASSAY OF NATRIURETIC PEPTIDE: CPT

## 2020-11-24 PROCEDURE — 6370000000 HC RX 637 (ALT 250 FOR IP): Performed by: PHYSICIAN ASSISTANT

## 2020-11-24 PROCEDURE — U0003 INFECTIOUS AGENT DETECTION BY NUCLEIC ACID (DNA OR RNA); SEVERE ACUTE RESPIRATORY SYNDROME CORONAVIRUS 2 (SARS-COV-2) (CORONAVIRUS DISEASE [COVID-19]), AMPLIFIED PROBE TECHNIQUE, MAKING USE OF HIGH THROUGHPUT TECHNOLOGIES AS DESCRIBED BY CMS-2020-01-R: HCPCS

## 2020-11-24 PROCEDURE — 80053 COMPREHEN METABOLIC PANEL: CPT

## 2020-11-24 PROCEDURE — 71260 CT THORAX DX C+: CPT

## 2020-11-24 PROCEDURE — 93010 ELECTROCARDIOGRAM REPORT: CPT | Performed by: INTERNAL MEDICINE

## 2020-11-24 PROCEDURE — 87086 URINE CULTURE/COLONY COUNT: CPT

## 2020-11-24 PROCEDURE — 82803 BLOOD GASES ANY COMBINATION: CPT

## 2020-11-24 PROCEDURE — 2580000003 HC RX 258: Performed by: EMERGENCY MEDICINE

## 2020-11-24 PROCEDURE — 36415 COLL VENOUS BLD VENIPUNCTURE: CPT

## 2020-11-24 PROCEDURE — 96365 THER/PROPH/DIAG IV INF INIT: CPT

## 2020-11-24 PROCEDURE — U0002 COVID-19 LAB TEST NON-CDC: HCPCS

## 2020-11-24 PROCEDURE — 6360000004 HC RX CONTRAST MEDICATION: Performed by: PHYSICIAN ASSISTANT

## 2020-11-24 PROCEDURE — 87077 CULTURE AEROBIC IDENTIFY: CPT

## 2020-11-24 PROCEDURE — 96367 TX/PROPH/DG ADDL SEQ IV INF: CPT

## 2020-11-24 PROCEDURE — 99285 EMERGENCY DEPT VISIT HI MDM: CPT

## 2020-11-24 PROCEDURE — 6360000002 HC RX W HCPCS: Performed by: PHYSICIAN ASSISTANT

## 2020-11-24 PROCEDURE — 71045 X-RAY EXAM CHEST 1 VIEW: CPT

## 2020-11-24 PROCEDURE — 84484 ASSAY OF TROPONIN QUANT: CPT

## 2020-11-24 PROCEDURE — 93005 ELECTROCARDIOGRAM TRACING: CPT | Performed by: EMERGENCY MEDICINE

## 2020-11-24 PROCEDURE — 1200000000 HC SEMI PRIVATE

## 2020-11-24 RX ORDER — ACETAMINOPHEN 325 MG/1
650 TABLET ORAL EVERY 6 HOURS PRN
Status: DISCONTINUED | OUTPATIENT
Start: 2020-11-24 | End: 2020-11-27 | Stop reason: HOSPADM

## 2020-11-24 RX ORDER — SODIUM CHLORIDE 0.9 % (FLUSH) 0.9 %
10 SYRINGE (ML) INJECTION PRN
Status: DISCONTINUED | OUTPATIENT
Start: 2020-11-24 | End: 2020-11-27 | Stop reason: HOSPADM

## 2020-11-24 RX ORDER — FLECAINIDE ACETATE 50 MG/1
50 TABLET ORAL 2 TIMES DAILY
COMMUNITY

## 2020-11-24 RX ORDER — PROMETHAZINE HYDROCHLORIDE 25 MG/1
12.5 TABLET ORAL EVERY 6 HOURS PRN
Status: DISCONTINUED | OUTPATIENT
Start: 2020-11-24 | End: 2020-11-27 | Stop reason: HOSPADM

## 2020-11-24 RX ORDER — ONDANSETRON 2 MG/ML
4 INJECTION INTRAMUSCULAR; INTRAVENOUS EVERY 6 HOURS PRN
Status: DISCONTINUED | OUTPATIENT
Start: 2020-11-24 | End: 2020-11-27 | Stop reason: HOSPADM

## 2020-11-24 RX ORDER — LORAZEPAM 0.5 MG/1
0.5 TABLET ORAL 2 TIMES DAILY
Status: DISCONTINUED | OUTPATIENT
Start: 2020-11-24 | End: 2020-11-27 | Stop reason: HOSPADM

## 2020-11-24 RX ORDER — SODIUM CHLORIDE 9 MG/ML
INJECTION, SOLUTION INTRAVENOUS CONTINUOUS
Status: DISPENSED | OUTPATIENT
Start: 2020-11-24 | End: 2020-11-25

## 2020-11-24 RX ORDER — DEXTROSE MONOHYDRATE 25 G/50ML
12.5 INJECTION, SOLUTION INTRAVENOUS PRN
Status: DISCONTINUED | OUTPATIENT
Start: 2020-11-24 | End: 2020-11-27 | Stop reason: HOSPADM

## 2020-11-24 RX ORDER — DEXTROSE MONOHYDRATE 50 MG/ML
100 INJECTION, SOLUTION INTRAVENOUS PRN
Status: DISCONTINUED | OUTPATIENT
Start: 2020-11-24 | End: 2020-11-27 | Stop reason: HOSPADM

## 2020-11-24 RX ORDER — SODIUM CHLORIDE 0.9 % (FLUSH) 0.9 %
10 SYRINGE (ML) INJECTION EVERY 12 HOURS SCHEDULED
Status: DISCONTINUED | OUTPATIENT
Start: 2020-11-24 | End: 2020-11-27 | Stop reason: HOSPADM

## 2020-11-24 RX ORDER — POLYETHYLENE GLYCOL 3350 17 G/17G
17 POWDER, FOR SOLUTION ORAL DAILY PRN
Status: DISCONTINUED | OUTPATIENT
Start: 2020-11-24 | End: 2020-11-27 | Stop reason: HOSPADM

## 2020-11-24 RX ORDER — NICOTINE POLACRILEX 4 MG
15 LOZENGE BUCCAL PRN
Status: DISCONTINUED | OUTPATIENT
Start: 2020-11-24 | End: 2020-11-27 | Stop reason: HOSPADM

## 2020-11-24 RX ORDER — ACETAMINOPHEN 650 MG/1
650 SUPPOSITORY RECTAL EVERY 6 HOURS PRN
Status: DISCONTINUED | OUTPATIENT
Start: 2020-11-24 | End: 2020-11-27 | Stop reason: HOSPADM

## 2020-11-24 RX ORDER — FLECAINIDE ACETATE 100 MG/1
50 TABLET ORAL 2 TIMES DAILY
Status: DISCONTINUED | OUTPATIENT
Start: 2020-11-24 | End: 2020-11-27 | Stop reason: HOSPADM

## 2020-11-24 RX ORDER — 0.9 % SODIUM CHLORIDE 0.9 %
1000 INTRAVENOUS SOLUTION INTRAVENOUS ONCE
Status: COMPLETED | OUTPATIENT
Start: 2020-11-24 | End: 2020-11-24

## 2020-11-24 RX ADMIN — SODIUM CHLORIDE 1000 ML: 9 INJECTION, SOLUTION INTRAVENOUS at 16:19

## 2020-11-24 RX ADMIN — LORAZEPAM 0.5 MG: 0.5 TABLET ORAL at 21:49

## 2020-11-24 RX ADMIN — VANCOMYCIN HYDROCHLORIDE 1000 MG: 1 INJECTION, POWDER, LYOPHILIZED, FOR SOLUTION INTRAVENOUS at 16:17

## 2020-11-24 RX ADMIN — APIXABAN 2.5 MG: 5 TABLET, FILM COATED ORAL at 21:49

## 2020-11-24 RX ADMIN — CEFTRIAXONE SODIUM 1 G: 1 INJECTION, POWDER, FOR SOLUTION INTRAMUSCULAR; INTRAVENOUS at 21:50

## 2020-11-24 RX ADMIN — PIPERACILLIN SODIUM AND TAZOBACTAM SODIUM 4.5 G: 4; .5 INJECTION, POWDER, LYOPHILIZED, FOR SOLUTION INTRAVENOUS at 15:39

## 2020-11-24 RX ADMIN — FLECAINIDE ACETATE 50 MG: 100 TABLET ORAL at 21:49

## 2020-11-24 RX ADMIN — IOPAMIDOL 85 ML: 755 INJECTION, SOLUTION INTRAVENOUS at 15:56

## 2020-11-24 RX ADMIN — SODIUM CHLORIDE: 9 INJECTION, SOLUTION INTRAVENOUS at 21:50

## 2020-11-24 RX ADMIN — Medication 10 ML: at 21:50

## 2020-11-24 ASSESSMENT — ENCOUNTER SYMPTOMS
RESPIRATORY NEGATIVE: 1
GASTROINTESTINAL NEGATIVE: 1

## 2020-11-24 ASSESSMENT — PAIN DESCRIPTION - LOCATION: LOCATION: CHEST

## 2020-11-24 ASSESSMENT — PAIN DESCRIPTION - DESCRIPTORS: DESCRIPTORS: BURNING

## 2020-11-24 ASSESSMENT — PAIN SCALES - GENERAL: PAINLEVEL_OUTOF10: 8

## 2020-11-24 NOTE — CARE COORDINATION
Spoke to a RN at [facility]. ACM explained the situation and he took down the information.

## 2020-11-24 NOTE — CARE COORDINATION
Patient contacted regarding COVID-19 risk and screening. Discussed COVID-19 related testing which was not done at this time. Test results were not done. Patient informed of results, if available? N/A; was positive on 11/10 and was hospitalized. Care Transition Nurse/ Ambulatory Care Manager contacted the family by telephone to perform follow-up assessment. Verified name and  with family as identifiers. Patient has following risk factors of: diabetes. Spoke with her daughter, Antonio Love. She states that her mom is \"doing really bad\" and cannot take care of herself. She states that her mom is now requiring 24/7 care and cannot be left alone. Antonio Love and her spouse, as well as her siblings all work full time and cannot care for her. She is with her mom right now and states that she is more confused and has almost fallen many times. She states that her medications were \"thrown everywhere\" and that she is incontinent. She spoke to someone at the [facility] about getting her mom admitted there, but was told that the fastest way would be to get her to the ED and then try and transfer from there. She will also need a negative COVID test prior to being allowed to come to the facility. Antonio Love plans to get her mom to [facility] ASAP. ACM will call the ED to give them a heads up of the situation because they are not allowing visitors at this time. Symptoms reviewed with family who verbalized the following symptoms: confusion or unusual change in mental status. Due to non[facility] PCP encounter was not routed to provider for escalation. Education provided regarding infection prevention, and signs and symptoms of COVID-19 and when to seek medical attention with family who verbalized understanding. Discussed exposure protocols and quarantine from [address] you at higher risk for severe illness  and given an opportunity for questions and concerns.  The family agrees to contact the

## 2020-11-24 NOTE — ED PROVIDER NOTES
The Ekg interpreted by me shows  Sinus rhythm with left bundle branch block with a rate of 91  Axis is   left  QTc is prolonged  First-degree AV block  ST Segments: Nonspecific ST changes  No significant change from prior EKG dated November 17, 2020      I did not see or evaluate this patient.      Anamaria Mendoza MD  11/24/20 0716

## 2020-11-24 NOTE — ED NOTES
Pt calm and resting quietly with equal rise and fall of chest. Pt in NAD, RR even and unlabored. Side rails up, bed locked and in lowest position. Pt updated on plan of care. No needs at this time. Pt instructed on use of call light if needed.       Calderon Gutierrez RN  11/24/20 6072

## 2020-11-24 NOTE — ED PROVIDER NOTES
[address] ED  EMERGENCY DEPARTMENT ENCOUNTER        Pt Name: Carri Dennis  MRN: [id number]  [place] 1/7/1930  Date of evaluation: 11/24/2020  Provider: Lena Ceja PA-C  PCP: Aida Mejia PA-C     I have seen and evaluated this patient with my supervising physician Darling [address] [place] [address]       Chief Complaint   Patient presents with    Concern For COVID-19     also family states she needs NH placement for lack of ability to care for Pt at home       HISTORY OF PRESENT ILLNESS   (Location, Timing/Onset, Context/Setting, Quality, Duration, Modifying Factors, Severity, Associated Signs and Symptoms)  Note limiting factors. Carri Dennis is a 80 y.o. female with a past medical history of diabetes, hyperlipidemia, hypertension, CAD brought in today by daughter for concern for failure to thrive. Per daughter patient lives alone and has not been taking her medications and not eating or drinking. Daughter is concerned because she is losing weight. Daughter also would like her to be tested for COVID-19. Onset of symptoms have been over the past several weeks however progressively gotten worse. Duration symptoms have been persistent since onset. No aggravating or alleviating complaints. Context includes failure to thrive; inability to care for self. Patient denies any other complaints at this time. Denies fevers chills nausea vomiting or diarrhea. Denies any chest pain or shortness of breath. Denies any productive cough. Denies any sick contacts. Nursing Notes were all reviewed and agreed with or any disagreements were addressed in the HPI. REVIEW OF SYSTEMS    (2-9 systems for level 4, 10 or more for level 5)     Review of Systems   Constitutional: Negative. HENT: Negative. Respiratory: Negative. Cardiovascular: Negative. Gastrointestinal: Negative. Genitourinary: Negative. Neurological: Negative.         Positives and Pertinent negatives as per HPI. Except as noted above in the ROS, all other systems were reviewed and negative. 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    TB (pulmonary tuberculosis) 2010         SURGICAL HISTORY     Past Surgical History:   Procedure Laterality Date    CARDIAC CATHETERIZATION      x 4 all normal    CHOLECYSTECTOMY, LAPAROSCOPIC N/A 5/1/13    LIVER BIOPSY; EXCISION OF NECROTIC LYMP NODE OF CROQUET; UMBILICAL HERNIA REPAIR    LUNG SURGERY      partial right lower lobectomy    SHOULDER SURGERY      THROAT SURGERY      vocal cord polyp         CURRENTMEDICATIONS       Previous Medications    ATORVASTATIN (LIPITOR) 20 MG TABLET    Take 1 tablet by mouth nightly    B COMPLEX VITAMINS (B COMPLEX 100 PO)    Take by mouth    DICLOFENAC SODIUM (VOLTAREN) 1 % GEL    APPLY 4 G TOPICALLY 4 TIMES DAILY    ELIQUIS 2.5 MG TABS TABLET    2.5 mg 2 times daily     ESOMEPRAZOLE MAGNESIUM (NEXIUM) 20 MG PACK    Take 20 mg by mouth daily    FAMOTIDINE (PEPCID) 10 MG TABLET    Take 1 tablet by mouth 2 times daily    FENOFIBRATE (TRICOR) 145 MG TABLET    fenofibrate nanocrystallized 145 mg tablet    FLECAINIDE (TAMBOCOR) 50 MG TABLET    Take 50 mg by mouth 2 times daily Take 1/2 tablet twice daily    GLIMEPIRIDE (AMARYL) 4 MG TABLET    Take 4 mg by mouth daily as needed Take before breakfast for blood sugar greater than 200.     INFLUENZA VAC SPLIT HIGH-DOSE (FLUZONE HIGH-DOSE) 0.5 ML ROSIBEL INJECTION    Fluzone High-Dose 2170-2057 (PF) 180 mcg/0.5 mL intramuscular syringe   TO BE ADMINISTERED BY PHARMACIST FOR IMMUNIZATION    KLOR-CON 10 10 MEQ TABLET        [facility]  [place] [address],  [place] [employer]   Phone [phone]   URINE RT REFLEX TO CULTURE - Abnormal; Notable for the following components:    Clarity, UA CLOUDY (*)     Glucose, Ur 100 (*)     Blood, Urine MODERATE (*)     Protein, UA TRACE (*)     Leukocyte Esterase, Urine LARGE (*)     All other components within normal limits    Narrative:     Performed at:  [place] [address],  [place] [employer] [place]   Phone [phone]   COMPREHENSIVE METABOLIC PANEL W/ REFLEX TO MG FOR LOW K - Abnormal; Notable for the following components:    Sodium 135 (*)     Potassium reflex Magnesium 5.2 (*)     Glucose 180 (*)     Alb 3.2 (*)     Albumin/Globulin Ratio 0.8 (*)      (*)     AST 76 (*)     All other components within normal limits    Narrative:     Performed at:  [place] [address],  [place] [employer]   Phone [phone]   MICROSCOPIC URINALYSIS - Abnormal; Notable for the following components:    WBC, UA >100 (*)     RBC, UA 11-20 (*)     Epithelial Cells, UA 6-10 (*)     Bacteria, UA 3+ (*)     All other components within normal limits    Narrative:     Performed at:  [place] [address],  [place], [employer]   Phone [phone]   CULTURE, URINE   COVID-19    Narrative:     Performed at:  [place] [address],  [place] [place], [employer]   Phone [phone]   BRAIN NATRIURETIC PEPTIDE    Narrative:     Performed at:  [place] [address],  [place], [employer]   Phone [phone]   TROPONIN    Narrative:     Performed at:  [facility],  [place], [employer]   Phone [phone]       All other labs were within normal range or not returned as of this dictation. EKG:  All EKG's are interpreted by the Emergency Department Physician in the absence of a cardiologist.  Please see their note for interpretation of EKG. RADIOLOGY:   Non-plain film images such as CT, Ultrasound and MRI are read by the radiologist. Plain radiographic images are visualized and preliminarily interpreted by the ED Provider with the below findings:        Interpretation per the Radiologist below, if available at the time of this note:    CT CHEST PULMONARY EMBOLISM W CONTRAST   Final Result   1. Negative for pulmonary embolus. 2. Multifocal bilateral atypical pneumonia. XR CHEST PORTABLE   Final Result   Small to moderate amount of bibasilar pulmonary opacity worrisome for   pneumonia. If very small bilateral pleural effusions are present, bacterial   infection would be favored. RECOMMENDATION:   CT scan could be performed for more thorough evaluation of the pulmonary   disease. No results found. PROCEDURES   Unless otherwise noted below, none     Procedures    CRITICAL CARE TIME   N/A    CONSULTS:  IP CONSULT TO CASE MANAGEMENT  IP CONSULT TO HOSPITALIST      EMERGENCY DEPARTMENT COURSE and DIFFERENTIAL DIAGNOSIS/MDM:   Vitals:    Vitals:    11/24/20 1353 11/24/20 1408 11/24/20 1506 11/24/20 1619   BP: 137/83 110/60 (!) 99/53 (!) 122/56   Pulse: 77 86 82 84   Resp: 17 16 17 20   Temp:       TempSrc:       SpO2: 94% 93% 98% 96%   Weight:       Height:           Patient was given the following medications:  Medications   vancomycin 1000 mg IVPB in 250 mL D5W addavial (1,000 mg Intravenous New Bag 11/24/20 1617)   piperacillin-tazobactam (ZOSYN) 4.5 g in sodium chloride 0.9 % 100 mL IVPB (mini-bag) (0 g Intravenous Stopped 11/24/20 1609)   0.9 % sodium chloride bolus (1,000 mLs Intravenous New Bag 11/24/20 1619)   iopamidol (ISOVUE-370) 76 % injection 85 mL (85 mLs Intravenous Given 11/24/20 1556)           Patient brought in today by daughter for concern for failure to thrive.   On exam alert oriented afebrile breathing on room air satting at 93%. Nontoxic no acute respiratory distress. Old labs and records reviewed. Patient seen by myself as well as my attending, Dr. Angel Luis Winston.     Urine reveals large amount of leukocytes with over 100 WBCs. No acute electrolyte abnormalities. Kidney function unremarkable. BNP unremarkable. Troponin is less than 0.01. CBC reveals a leukocytosis of 11.2. Hemoglobin of 14.8. VBG unremarkable. COVID-19 is negative. EKG reviewed by my attending see note for dictation. Chest x-ray reveals small to moderate amount of bibasilar pulmonary opacity worrisome for pneumonia. If very small bilateral pleural effusions are present, bacterial infection would be favored. Recommend CT scan for further evaluation of pulmonary disease. Patient given vancomycin and Zosyn at this time. We will also obtain a CT PE study for further evaluation. CT PE study is negative for PE. Multifocal bilateral atypical pneumonia noted. Patient given vancomycin and Zosyn as well as fluids. Plan at this time will be to admit for multifocal pneumonia, urinary tract infection as well as failure to thrive. I spoke to [facility] the physician assistant with the hospitalist service who did agree to accept this admission. FINAL IMPRESSION      1. Multifocal pneumonia    2. Acute cystitis with hematuria          DISPOSITION/PLAN   DISPOSITION Admitted 11/24/2020 05:32:50 PM      PATIENT REFERREDTO:  No follow-up provider specified.     DISCHARGE MEDICATIONS:  New Prescriptions    No medications on file       DISCONTINUED MEDICATIONS:  Discontinued Medications    No medications on file              (Please note that portions of this note were completed with a voice recognition program.  Efforts were made to edit the dictations but occasionally words are mis-transcribed.)    Rosaline Osler, PA-C (electronically signed)            Rosaline Osler, PA-C  11/24/20 4321

## 2020-11-24 NOTE — PLAN OF CARE
Admit to Med Surg    Failure to Thrive - needs placement  UTI    Recent admission for COVID 19 (11/09 - 11/11), rapid covid test negative, will continue droplet + precautions for now & allow provider examining pt to assess if continued need for precautions

## 2020-11-24 NOTE — PROGRESS NOTES
Upon entering patient's room per CT scan, patient had removed iv; RN made aware of new iv placement needed 20 ga or larger in the [facility] [place] operated by [facility] to perform CTPA.

## 2020-11-24 NOTE — ED NOTES
RN updated pt daughter, Julisa James, on plan of care and results. Questions answered. Requested to be contacted with further information in plan of care.       Lisa Treviño RN  11/24/20 8852

## 2020-11-25 LAB
ANION GAP SERPL CALCULATED.3IONS-SCNC: 11 MMOL/L (ref 3–16)
BASOPHILS ABSOLUTE: 0.1 K/UL (ref 0–0.2)
BASOPHILS RELATIVE PERCENT: 1.1 %
BUN BLDV-MCNC: 13 MG/DL (ref 7–20)
CALCIUM SERPL-MCNC: 8.5 MG/DL (ref 8.3–10.6)
CHLORIDE BLD-SCNC: 105 MMOL/L (ref 99–110)
CO2: 21 MMOL/L (ref 21–32)
CREAT SERPL-MCNC: 0.7 MG/DL (ref 0.6–1.2)
EOSINOPHILS ABSOLUTE: 0.1 K/UL (ref 0–0.6)
EOSINOPHILS RELATIVE PERCENT: 1.9 %
GFR AFRICAN AMERICAN: >60
GFR NON-AFRICAN AMERICAN: >60
GLUCOSE BLD-MCNC: 102 MG/DL (ref 70–99)
GLUCOSE BLD-MCNC: 116 MG/DL (ref 70–99)
GLUCOSE BLD-MCNC: 150 MG/DL (ref 70–99)
GLUCOSE BLD-MCNC: 231 MG/DL (ref 70–99)
GLUCOSE BLD-MCNC: 247 MG/DL (ref 70–99)
HCT VFR BLD CALC: 40.1 % (ref 36–48)
HEMOGLOBIN: 13.7 G/DL (ref 12–16)
LYMPHOCYTES ABSOLUTE: 1 K/UL (ref 1–5.1)
LYMPHOCYTES RELATIVE PERCENT: 13.6 %
MCH RBC QN AUTO: 34.4 PG (ref 26–34)
MCHC RBC AUTO-ENTMCNC: 34.3 G/DL (ref 31–36)
MCV RBC AUTO: 100.4 FL (ref 80–100)
MONOCYTES ABSOLUTE: 0.6 K/UL (ref 0–1.3)
MONOCYTES RELATIVE PERCENT: 8.4 %
NEUTROPHILS ABSOLUTE: 5.3 K/UL (ref 1.7–7.7)
NEUTROPHILS RELATIVE PERCENT: 75 %
PDW BLD-RTO: 12.2 % (ref 12.4–15.4)
PERFORMED ON: ABNORMAL
PLATELET # BLD: 100 K/UL (ref 135–450)
PMV BLD AUTO: 10 FL (ref 5–10.5)
POTASSIUM REFLEX MAGNESIUM: 3.9 MMOL/L (ref 3.5–5.1)
RBC # BLD: 3.99 M/UL (ref 4–5.2)
SODIUM BLD-SCNC: 137 MMOL/L (ref 136–145)
WBC # BLD: 7.1 K/UL (ref 4–11)

## 2020-11-25 PROCEDURE — 6360000002 HC RX W HCPCS: Performed by: PHYSICIAN ASSISTANT

## 2020-11-25 PROCEDURE — 97530 THERAPEUTIC ACTIVITIES: CPT

## 2020-11-25 PROCEDURE — 97535 SELF CARE MNGMENT TRAINING: CPT

## 2020-11-25 PROCEDURE — 80048 BASIC METABOLIC PNL TOTAL CA: CPT

## 2020-11-25 PROCEDURE — 85025 COMPLETE CBC W/AUTO DIFF WBC: CPT

## 2020-11-25 PROCEDURE — 1200000000 HC SEMI PRIVATE

## 2020-11-25 PROCEDURE — 97161 PT EVAL LOW COMPLEX 20 MIN: CPT

## 2020-11-25 PROCEDURE — 36415 COLL VENOUS BLD VENIPUNCTURE: CPT

## 2020-11-25 PROCEDURE — 2580000003 HC RX 258: Performed by: PHYSICIAN ASSISTANT

## 2020-11-25 PROCEDURE — 97166 OT EVAL MOD COMPLEX 45 MIN: CPT

## 2020-11-25 PROCEDURE — 97116 GAIT TRAINING THERAPY: CPT

## 2020-11-25 PROCEDURE — 6370000000 HC RX 637 (ALT 250 FOR IP): Performed by: PHYSICIAN ASSISTANT

## 2020-11-25 RX ADMIN — APIXABAN 2.5 MG: 5 TABLET, FILM COATED ORAL at 11:23

## 2020-11-25 RX ADMIN — INSULIN LISPRO 1 UNITS: 100 INJECTION, SOLUTION INTRAVENOUS; SUBCUTANEOUS at 22:03

## 2020-11-25 RX ADMIN — Medication 10 ML: at 22:03

## 2020-11-25 RX ADMIN — LORAZEPAM 0.5 MG: 0.5 TABLET ORAL at 11:22

## 2020-11-25 RX ADMIN — LORAZEPAM 0.5 MG: 0.5 TABLET ORAL at 22:01

## 2020-11-25 RX ADMIN — FLECAINIDE ACETATE 50 MG: 100 TABLET ORAL at 11:22

## 2020-11-25 RX ADMIN — FLECAINIDE ACETATE 50 MG: 100 TABLET ORAL at 22:01

## 2020-11-25 RX ADMIN — APIXABAN 2.5 MG: 5 TABLET, FILM COATED ORAL at 22:02

## 2020-11-25 RX ADMIN — CEFTRIAXONE SODIUM 1 G: 1 INJECTION, POWDER, FOR SOLUTION INTRAMUSCULAR; INTRAVENOUS at 22:22

## 2020-11-25 NOTE — PROGRESS NOTES
4 Eyes Skin Assessment     The patient is being assess for   Admission    I agree that 2 RN's have performed a thorough Head to Toe Skin Assessment on the patient. ALL assessment sites listed below have been assessed. Areas assessed by both nurses:   [x]   Head, Face, and Ears   [x]   Shoulders, Back, and Chest, Abdomen  [x]   Arms, Elbows, and Hands   [x]   Coccyx, Sacrum, and Ischium  [x]   Legs, Feet, and Heels        Skin in tact, bruising scattered    **SHARE this note so that the co-signing nurse is able to place an eSignature**    Co-signer eSignature: {Esignature:[id number]}    Does the Patient have Skin Breakdown?   No          Jatin Prevention initiated:  No   Wound Care Orders initiated:  No      [facility] nurse consulted for Pressure Injury (Stage 3,4, Unstageable, DTI, NWPT, Complex wounds)and New or Established Ostomies:  No      Primary Nurse eSignature: Electronically signed by Anahi Carney RN on 11/25/20 at 3:39 AM EST

## 2020-11-25 NOTE — ED NOTES
RN updated Essence Miller, daughter, on plan of care and admission to hospital. In compliance with plan     Tiffany Yang RN  11/24/20 1931

## 2020-11-25 NOTE — PROGRESS NOTES
Inpatient Physical Therapy Evaluation and Treatment    Unit: [facility]  Date:  11/25/2020  Patient Name:    Jin Chen  Admitting diagnosis:  Failure to thrive in adult [R62.7]  Admit Date:  11/24/2020  Precautions/Restrictions/WB Status/ Lines/ Wounds/ Oxygen: Fall risk, Bed/chair alarm, Confusion, Telemetry, Continuous pulse oximetry and Isolation Precautions: Droplet Plus - COVID, [place]    Treatment Time:  14:05-14:38  Treatment Number:  1   Timed Code Treatment Minutes: 23 minutes  Total Treatment Minutes:  33  minutes    Patient Goals for Therapy: \" go home today \"          Discharge Recommendations: Home 24 hr assist  and Home PT  DME needs for discharge: Needs Met       Therapy recommendation for EMS Transport: can transport by wheelchair    Therapy recommendations for staff:   Assist of 1 with use of gait belt and hand held assist for all transfers and ambulation to/from bathroom    History of Present Illness: (Per ED provider note on 11/25/20 by Chris Varela PA-C)  80 y.o. female with a past medical history of diabetes, hyperlipidemia, hypertension, CAD brought in today by daughter for concern for failure to thrive. Per daughter patient lives alone and has not been taking her medications and not eating or drinking. Daughter is concerned because she is losing weight. Daughter also would like her to be tested for COVID-19. Onset of symptoms have been over the past several weeks however progressively gotten worse. Duration symptoms have been persistent since onset. No aggravating or alleviating complaints. Context includes failure to thrive; inability to care for self. Patient denies any other complaints at this time. Denies fevers chills nausea vomiting or diarrhea. Denies any chest pain or shortness of breath. Denies any productive cough. Denies any sick contacts. Per chart pt was COVID positive 2 weeks ago (recent admission 11/9-11/11).  New COVID PCR ordered on 11/24/20, continue droplet unless results are negative. Home Health S4 Level Recommendation:  Level 1 Standard  AM-PAC Mobility Score            Preadmission Environment  Pt is a questionable historian. Information pulled from previous PT/OT eval on 11/10/20 and reviewed with patient. Pt. [address] [place] [address] environment:    apartment   Steps to enter first floor:   No [address] and [address] (RW)      Preadmission Status / PLOF:  History of falls             No  Pt. Able to drive          Yes  Pt Fully independent with ADL's         Yes  Pt. Required assistance from family for:  Independent PTA     Pt does sponge bathes  Pt. Fully independent for transfers and gait and walked with: without AD, occassionally used walker/cane    Pain   No    Cognition    A&O Person, Time (month, tomorrow is Thanksgiving), pt initially states that she is at home - when told she isn't at home she states \"then I must be at the hospital\"  Pt requires reorientation to where she is throughout session   Able to follow 1 step commands inconsistently    Subjective  Patient lying supine in bed with no family present. Pt agreeable to this PT eval & tx. Pt sleeping but rouses easily    Upper Extremity ROM/Strength  Please see OT evaluation. Lower Extremity ROM / Strength   AROM WFL: Yes    Formal strength testing deferred due to poor command following. and BLE strength WFL, but not formally assessed with MMT. B LEs grossly at least 3/5 as observed through functional mobility.     Lower Extremity Sensation    NT    Lower Extremity Proprioception:   NT    Coordination and Tone  NT    Balance  Sitting:  Good - ; Supervision  Comments: 5 minutes seated EOB with mild sway, intermittent use of UE support    Standing: Fair +; CGA  Comments: 1 + 1 + 2 minutes static standing and washing hands, mild sway initially and pt uses good reaching technique with UEs to steady herself    Bed Mobility   Supine to Sit: assistance family can provide at home. Will contact CM to discuss. Recommending Home 24 hr assist and with home PT upon discharge as patient functioning close to baseline level and would benefit from continued therapy services    Goals : To be met in 3 visits:  1). Independent with LE Ex x 10 reps    To be met in 6 visits:  1). Supine to/from sit: Independent  2). Sit to/from stand: Independent  3). Bed to chair: Independent  4). Gait: Ambulate 50 ft.  with  Supervision and use of LRAD (least restrictive assistive device)  5). Tolerate B LE exercises 3 sets of 10-15 reps    Rehabilitation Potential: Good  Strengths for achieving goals include:   Pt motivated, PLOF, Family Support and Pt cooperative   Barriers to achieving goals include:    Confusion    Plan    To be seen 2-3 x / week  while in acute care setting for therapeutic exercises, bed mobility, transfers, progressive gait training, balance training, and family/patient education. Signature: Leeroy Bautista, PT, DPT #[id number]    If patient discharges from this facility prior to next visit, this note will serve as the Discharge Summary.

## 2020-11-25 NOTE — PROGRESS NOTES
Inpatient Occupational Therapy  Evaluation and Treatment    Unit: [address] [place]  Date:  11/25/2020  Patient Name:    Maria Elena Phan  Admitting diagnosis:  Failure to thrive in adult [R62.7]  Admit Date:  11/24/2020  Precautions/Restrictions/WB Status/ Lines/ Wounds/ Oxygen: Fall risk, Bed/chair alarm, Confusion and Telemetry    Treatment Time:  1405  Treatment Number: 1   Timed code treatment minutes 35 minutes   Total Treatment minutes:   45   minutes    Patient Goals for Therapy:  \" pt wants to go home \"      Discharge Recommendations: Home 24 hr assist  and Home OT  DME needs for discharge: Needs Met       Therapy recommendations for staff:   Assist of 1 with use of No AD for all ambulation to/from bathroom    History of Present Illness: H&P    80 y.o. female with a past medical history of diabetes, hyperlipidemia, hypertension, CAD brought in today by daughter for concern for failure to thrive. Per daughter patient lives alone and has not been taking her medications and not eating or drinking. Daughter is concerned because she is losing weight. Daughter also would like her to be tested for COVID-19. Onset of symptoms have been over the past several weeks however progressively gotten worse. Duration symptoms have been persistent since onset. No aggravating or alleviating complaints. Context includes failure to thrive; inability to care for self. Home Health S4 Level Recommendation:  Level one   AM-PAC Score:  19    Preadmission Environment    Pt is a questionable historian. Information pulled from previous PT/OT eval on 11/10/20 and reviewed with patient. Pt. [address] [place] [address] environment:    apartment   Steps to enter first floor:   No [address] and [address] (RW)      Preadmission Status / PLOF:  History of falls             No  Pt. Able to drive          Yes  Pt Fully independent with ADL's         Yes  Pt.  Required assistance from family for:  Independent PTA               Pt does sponge bathes  Pt. Fully independent for transfers and gait and walked with: without AD, occassionally used walker/cane  Pain  No      Cognition    A&O Person, Time (month, tomorrow is Thanksgiving), pt initially states that she is at home - when told she isn't at home she states \"then I must be at the hospital\"  Pt requires reorientation to where she is throughout session   Able to follow 1 step commands inconsistently    Subjective  Patient lying supine in bed with no family present. Pt agreeable to this OT eval & tx. Upper Extremity ROM:    WFL    Upper Extremity Strength:      WFL    Upper Extremity Sensation    WFL    Upper Extremity Proprioception:  WFL    Coordination and Tone  WFL    Balance  Functional Sitting Balance:  WFL  Functional Standing Balance:Diminished    Bed mobility:    Supine to sit:   Supervision  Sit to supine:   Modified Independent  Rolling:    Modified Independent  Scooting in sitting:  Modified Independent  Scooting to head of bed:   Not Tested    Bridging:   Not Tested    Transfers:    Sit to stand:  Supervision  Stand to sit:  Supervision  Bed to chair:   Not Tested  Standard toilet: Supervision  Bed to [facility]:  Not Tested    Dressing:      UE:   Not Tested  LE:    Not Tested    Bathing:    UE:  Not Tested  LE:  Not Tested    Eating:   Not Tested    Toileting:  Supervision with VC    Activity Tolerance   Pt completed therapy session with decreased balance decreased cognition   SPO2 90-93%  BP sitting  145/70   HR 84   Positioning Needs:   Pt in bed, alarm set, positioned in proper neutral alignment and pressure relief provided. Exercise / Activities Initiated:   N/A    Patient/Family Education:   Role of OT    Assessment of Deficits: Pt seen for Occupational therapy evaluation in acute care setting.   Pt demonstrated decreased Activity tolerance, balance Pt functioning below baseline and will likely benefit from skilled occupational

## 2020-11-25 NOTE — PROGRESS NOTES
Pt is confused to situation she is stating she doesn't need to be here and rambling. reoriented to situation with little learning noted. BA in place. No tellesitter available clinical called and is aware of need.

## 2020-11-25 NOTE — ED NOTES
Neurological Neurological - Orientation Level: Oriented to place; Oriented to time; Disoriented to situation; Oriented to person  Cognition: Short term memory loss;  Impulsive; Poor safety awareness; Poor judgement   Gisela Coma Scale - Eye Opening: Spontaneous  Best Verbal Response: Confused  Best Motor Response: Obeys commands  [place] Coma Scale Score: [address] [facility], [address] [place] [address]  11/24/20 1993

## 2020-11-25 NOTE — ED NOTES
Pt taken off unit via stretcher in NAD, RR even and unlabored. Pt report given to Yvonne Wong RN 2W face to face. Questions answered, care transferred.       Naomi Taylor RN  11/24/20 2013

## 2020-11-25 NOTE — PROGRESS NOTES
Per RATNA Moreno note \"Recent admission for COVID 19 (11/09 - 11/11), rapid covid test negative, will continue droplet + precautions for now & allow provider examining pt to assess if continued need for precautions. \"    Lab did not receive a PCR, no order seen at this time in chart. Reviewed with nocturnist and waiting for response if PCR/precautions to continue. Dez Durbin Clinical       Nocturnist ordered COVID PCR, due to less than 20 days since positive result. Dr Humphrey updated as well and ok to discontinue precautions if PCR is negative.     Dez Durbin Clinical

## 2020-11-25 NOTE — CARE COORDINATION
Case Management Assessment  Initial Evaluation      Patient Name: Jackie Senior  YOB: 1930  Diagnosis: Failure to thrive in adult [R62.7]  Date / Time: 11/24/2020 11:28 AM    Admission status/Date:INPT 11/24/2020  Chart Reviewed: Yes      Patient Interviewed: No   Family Interviewed:  Yes - pt dtr/poa: Aguilar Dc      Hospitalization in the last 30 days:  No      Health Care Decision Maker :   Dtr/poa [facility]: [phone]  (CM - must 1st enter selection under Navigator - emergency contact- [place] Relationship and pick relationship)   Who do you trust or have selected to make healthcare decisions for you      Met with: -  Interview conducted  (bedside/phone): Obie schmidt, via telephone    Current PCP: [address] required for SNF : Y, N          3 night stay required - Y, N    ADLS  Support Systems/Care Needs:    Transportation: self    Meal Preparation: self    Housing  Living Arrangements: home alone  Steps: n/a  Intent for return to present living arrangements: SNF  Identified Issues: no help at home.     Home Care Information  Active with Home Health Care : No Agency:(Services)     Passport/Waiver : No  :                      Phone Number:    Passport/Waiver Services: n/a          Durable Medical Equiptment   DME Provider: n/a  Equipment: n/a  Walker___Cane___RTS___ BSC___Shower Chair___Hospital Bed___W/C____Other________  02 at ____Liter(s)---wears(frequency)_______ HHN ___ CPAP___ BiPap___   N/A____      Home O2 Use :  No    If No for home O2---if presently on O2 during hospitalization:  No  if yes CM to follow for potential DC O2 need  Informed of need for care provider to bring portable home O2 tank on day of discharge for nursing to connect prior to leaving:   Not Indicated  Verbalized agreement/Understanding:   Not Indicated    Community Service Affiliation  Dialysis:  No    · Agency:  · Location:  · Dialysis Schedule:  · Phone:   · Fax: Other Community Services: (ex:PT/OT,Mental Health,Wound Clinic, Cardio/Pul [address])    DISCHARGE PLAN: Explained Case Management role/services. Reviewed chart and spoke with pt dtr/poa, Rebeca Cameron, via telephone. Noted PT/OT recommending home with 24/7 care and home therapy. Rebeca Cameron states that pt \"cannot return home\" at this time due to pt needed more assistance and family works and cannot care for her 24/7. Pt dtr states that she would like referral to [facility]. Referral called and faxed to Gisel Frausto at [facility] who states that she will review pt information. Will follow.

## 2020-11-26 LAB
ANION GAP SERPL CALCULATED.3IONS-SCNC: 9 MMOL/L (ref 3–16)
BASOPHILS ABSOLUTE: 0.1 K/UL (ref 0–0.2)
BASOPHILS RELATIVE PERCENT: 1.5 %
BUN BLDV-MCNC: 10 MG/DL (ref 7–20)
CALCIUM SERPL-MCNC: 8.3 MG/DL (ref 8.3–10.6)
CHLORIDE BLD-SCNC: 100 MMOL/L (ref 99–110)
CO2: 23 MMOL/L (ref 21–32)
CREAT SERPL-MCNC: 0.6 MG/DL (ref 0.6–1.2)
EOSINOPHILS ABSOLUTE: 0.2 K/UL (ref 0–0.6)
EOSINOPHILS RELATIVE PERCENT: 2.6 %
GFR AFRICAN AMERICAN: >60
GFR NON-AFRICAN AMERICAN: >60
GLUCOSE BLD-MCNC: 117 MG/DL (ref 70–99)
GLUCOSE BLD-MCNC: 169 MG/DL (ref 70–99)
GLUCOSE BLD-MCNC: 172 MG/DL (ref 70–99)
GLUCOSE BLD-MCNC: 185 MG/DL (ref 70–99)
GLUCOSE BLD-MCNC: 215 MG/DL (ref 70–99)
HCT VFR BLD CALC: 38.9 % (ref 36–48)
HEMOGLOBIN: 13.4 G/DL (ref 12–16)
LYMPHOCYTES ABSOLUTE: 0.8 K/UL (ref 1–5.1)
LYMPHOCYTES RELATIVE PERCENT: 13.3 %
MCH RBC QN AUTO: 34 PG (ref 26–34)
MCHC RBC AUTO-ENTMCNC: 34.4 G/DL (ref 31–36)
MCV RBC AUTO: 98.8 FL (ref 80–100)
MONOCYTES ABSOLUTE: 0.6 K/UL (ref 0–1.3)
MONOCYTES RELATIVE PERCENT: 9.4 %
NEUTROPHILS ABSOLUTE: 4.6 K/UL (ref 1.7–7.7)
NEUTROPHILS RELATIVE PERCENT: 73.2 %
ORGANISM: ABNORMAL
PDW BLD-RTO: 12 % (ref 12.4–15.4)
PERFORMED ON: ABNORMAL
PLATELET # BLD: 104 K/UL (ref 135–450)
PMV BLD AUTO: 10.1 FL (ref 5–10.5)
POTASSIUM SERPL-SCNC: 3.5 MMOL/L (ref 3.5–5.1)
RBC # BLD: 3.94 M/UL (ref 4–5.2)
SARS-COV-2, PCR: DETECTED
SODIUM BLD-SCNC: 132 MMOL/L (ref 136–145)
URINE CULTURE, ROUTINE: ABNORMAL
URINE CULTURE, ROUTINE: ABNORMAL
WBC # BLD: 6.3 K/UL (ref 4–11)

## 2020-11-26 PROCEDURE — 6370000000 HC RX 637 (ALT 250 FOR IP): Performed by: PHYSICIAN ASSISTANT

## 2020-11-26 PROCEDURE — 80048 BASIC METABOLIC PNL TOTAL CA: CPT

## 2020-11-26 PROCEDURE — 36415 COLL VENOUS BLD VENIPUNCTURE: CPT

## 2020-11-26 PROCEDURE — 1200000000 HC SEMI PRIVATE

## 2020-11-26 PROCEDURE — 2580000003 HC RX 258: Performed by: PHYSICIAN ASSISTANT

## 2020-11-26 PROCEDURE — 85025 COMPLETE CBC W/AUTO DIFF WBC: CPT

## 2020-11-26 RX ADMIN — FLECAINIDE ACETATE 50 MG: 100 TABLET ORAL at 08:43

## 2020-11-26 RX ADMIN — APIXABAN 2.5 MG: 5 TABLET, FILM COATED ORAL at 21:24

## 2020-11-26 RX ADMIN — FLECAINIDE ACETATE 50 MG: 100 TABLET ORAL at 21:23

## 2020-11-26 RX ADMIN — Medication 10 ML: at 21:24

## 2020-11-26 RX ADMIN — LORAZEPAM 0.5 MG: 0.5 TABLET ORAL at 08:43

## 2020-11-26 RX ADMIN — Medication 10 ML: at 08:44

## 2020-11-26 RX ADMIN — LORAZEPAM 0.5 MG: 0.5 TABLET ORAL at 21:23

## 2020-11-26 RX ADMIN — APIXABAN 2.5 MG: 5 TABLET, FILM COATED ORAL at 08:43

## 2020-11-26 RX ADMIN — INSULIN LISPRO 1 UNITS: 100 INJECTION, SOLUTION INTRAVENOUS; SUBCUTANEOUS at 21:28

## 2020-11-26 NOTE — FLOWSHEET NOTE
11/25/20 1959   Vital Signs   Temp 97.7 °F (36.5 °C)   Temp Source Oral   Pulse 83   Heart Rate Source Monitor   Resp 18   /72   BP Location Left lower arm   BP Upper/Lower Lower   Patient Position Semi fowlers   Oxygen Therapy   SpO2 94 %   O2 Device None (Room air)   PM medications given as ordered, including scheduled ativan which patient was requesting to sleep. Pt confused, unaware of where she is or why she is here. RN updated patient. Bed alarm set. Call light within reach. Patient offered new pair of hospital [facility] pants, declined. PIV intact to  Right FA, with coban. No needs at this time. Will continue to monitor.

## 2020-11-26 NOTE — FLOWSHEET NOTE
11/26/20 0757   Vital Signs   Temp 97.6 °F (36.4 °C)   Temp Source Oral   Pulse 82   Heart Rate Source Monitor   Resp 16   /70   BP Location Left Arm   BP Upper/Lower Upper   Patient Position Semi fowlers   Level of Consciousness 0   MEWS Score 1   Oxygen Therapy   SpO2 96 %   O2 Device None (Room air)   Pt sitting in bed at this time, alert and oriented to self and place. Confusion and forgetfulness noted. Received AM medications per order without issues. Resp e/e, no s/s of distress noted. SOB observed while ambulating to the bathroom but resolved quickly once returning to bed. Assessment complete; see flow sheet. Denies needs at this time. Call light within reach.

## 2020-11-26 NOTE — PROGRESS NOTES
Hospitalist Progress Note      PCP: Olivia Mauricio PA-C    Date of Admission: 11/24/2020    Subjective: pt still upset about not being at home    Medications:  Reviewed    Infusion Medications    dextrose       Scheduled Medications    apixaban  2.5 mg Oral BID    flecainide  50 mg Oral BID    LORazepam  0.5 mg Oral BID    insulin lispro  0-6 Units Subcutaneous TID WC    insulin lispro  0-3 Units Subcutaneous Nightly    cefTRIAXone (ROCEPHIN) IV  1 g Intravenous Q24H    sodium chloride flush  10 mL Intravenous 2 times per day     PRN Meds: glucose, dextrose, glucagon (rDNA), dextrose, sodium chloride flush, acetaminophen **OR** acetaminophen, polyethylene glycol, promethazine **OR** ondansetron      Intake/Output Summary (Last 24 hours) at 11/26/2020 1819  Last data filed at 11/26/2020 1526  Gross per 24 hour   Intake 490 ml   Output --   Net 490 ml       Physical Exam Performed:    /69   Pulse 78   Temp 99.2 °F (37.3 °C) (Oral)   Resp 16   Ht 5' 5\" (1.651 m)   Wt 124 lb 12.5 oz (56.6 kg)   SpO2 96%   BMI 20.76 kg/m²     General appearance: NAD  Lungs: Clear to auscultation, bilaterally without Rales/Wheezes/Rhonchi with good respiratory effort. Heart: Regular rate and rhythm with Normal S1/S2   Abdomen: Soft, non-tender or non-distended without rigidity or guarding and positive bowel sounds   Extremities: No clubbing, cyanosis, or edema bilaterally. Full range of motion without deformity and normal gait intact. Skin: Skin color, texture, turgor normal.  No rashes or lesions. Neurologic: Alert and oriented to self, neurovascularly intact with sensory/motor intact upper extremities/lower extremities, bilaterally.   Cranial nerves: II-XII intact, grossly non-focal.  Mental status: Alert, awake  Capillary Refill: Acceptable  < 3 seconds  Peripheral Pulses: +3 Easily felt, not easily obliterated with pressure       Labs:   Recent Labs     11/24/20  1300 11/25/20  0626 11/26/20  0543   WBC 11.2* 7.1 6.3   HGB 14.8 13.7 13.4   HCT 43.3 40.1 38.9   * 100* 104*     Recent Labs     11/24/20  2208 11/25/20  0626 11/26/20  0546   * 137 132*   K 4.1 3.9 3.5    105 100   CO2 22 21 23   BUN 16 13 10   CREATININE 0.7 0.7 0.6   CALCIUM 8.8 8.5 8.3     Recent Labs     11/24/20  1353   AST 76*   *   BILITOT 0.6   ALKPHOS 66     No results for input(s): INR in the last 72 hours. Recent Labs     11/24/20  1353   TROPONINI <0.01       Urinalysis:      Lab Results   Component Value Date    NITRU Negative 11/24/2020    WBCUA >100 11/24/2020    BACTERIA 3+ 11/24/2020    RBCUA 11-20 11/24/2020    BLOODU MODERATE 11/24/2020    SPECGRAV >=1.030 11/24/2020    GLUCOSEU 100 11/24/2020       Radiology:  CT CHEST PULMONARY EMBOLISM W CONTRAST   Final Result   1. Negative for pulmonary embolus. 2. Multifocal bilateral atypical pneumonia. XR CHEST PORTABLE   Final Result   Small to moderate amount of bibasilar pulmonary opacity worrisome for   pneumonia. If very small bilateral pleural effusions are present, bacterial   infection would be favored. RECOMMENDATION:   CT scan could be performed for more thorough evaluation of the pulmonary   disease. Assessment/Plan:    Active Hospital Problems    Diagnosis    Failure to thrive in adult [R62.7]         Multifocal PNA - seen on CXR/CT chest, started on IV abx.  Check urine ag legionella/strep ag  COVID 19 positive, but not hypoxic     Acute metabolic encephalopathy - suspect early dementia, worsened with infection     UTI - reviewed urine cx, cont IV abx     Par a fib - cont flecainide/eliquis        DVT Prophylaxis: eliquis  Diet: DIET CARB CONTROL;  Code Status: Full Code    PT/OT Eval Status: ordered, daughter interested in placement    George Hernandez MD

## 2020-11-26 NOTE — PROGRESS NOTES
Clinical RN notified of patient in droplet plus precautions in computer but not in place in patients room. Confirmed in MD notes patient to remain in droplet plus until PCR covid come back negative, as she was positive 11/10. Clinical RN notified patient was not in precautions when this RN arrived on shift and while this RN was providing care to patient prior to this note.  Droplet plus precautions now in place

## 2020-11-26 NOTE — H&P
0.5 mg by mouth 2 times daily. .   Yes Historical Provider, MD   glimepiride (AMARYL) 4 MG tablet Take 4 mg by mouth daily as needed Take before breakfast for blood sugar greater than 200. Yes Historical Provider, MD   KLOR-CON 10 10 MEQ tablet  7/14/15  Yes Historical Provider, MD   famotidine (PEPCID) 10 MG tablet Take 1 tablet by mouth 2 times daily 11/17/20 12/17/20  RATNA Johnson   diclofenac sodium (VOLTAREN) 1 % GEL APPLY 4 G TOPICALLY 4 TIMES DAILY 7/2/20 8/1/20  Teresa Brar DO   Influenza Vac Split High-Dose (FLUZONE HIGH-DOSE) 0.5 ML ROSIBEL injection Fluzone High-Dose [id number] (PF) 180 mcg/0.5 mL intramuscular syringe   TO BE ADMINISTERED BY PHARMACIST FOR IMMUNIZATION    Historical Provider, MD   fenofibrate (TRICOR) 145 MG tablet fenofibrate nanocrystallized 145 mg tablet    Historical Provider, MD   atorvastatin (LIPITOR) 20 MG tablet Take 1 tablet by mouth nightly 7/5/18   Sage Jaramillo MD   esomeprazole Magnesium (NEXIUM) 20 MG PACK Take 20 mg by mouth daily    Historical Provider, MD   Multiple Vitamins-Minerals (THERAPEUTIC MULTIVITAMIN-MINERALS) tablet Take 1 tablet by mouth daily    Historical Provider, MD   B Complex Vitamins (B COMPLEX 100 PO) Take by mouth    Historical Provider, MD   TRUETEST TEST strip  7/29/15   Historical Provider, MD   MAG-G 500 (27 MG) MG TABS tablet  8/4/15   Historical Provider, MD   vitamin B-12 (CYANOCOBALAMIN) 1000 MCG tablet Take 1,000 mcg by mouth daily. Historical Provider, MD       Allergies: Other; Penicillins; Polysporin [bacitracin-polymyxin b]; and Adhesive tape    Social History:  The patient currently lives at home    TOBACCO:   reports that she has never smoked. She has never used smokeless tobacco.  ETOH:   reports no history of alcohol use. Family History:  Reviewed in detail and negative for DM, Early CAD, Cancer, CVA. Positive as follows:    History reviewed. No pertinent family history.     REVIEW OF SYSTEMS:   Positive for fatigue and as noted in the HPI. All other systems reviewed and negative. PHYSICAL EXAM:    /69   Pulse 78   Temp 99.2 °F (37.3 °C) (Oral)   Resp 16   Ht 5' 5\" (1.651 m)   Wt 124 lb 12.5 oz (56.6 kg)   SpO2 96%   BMI 20.76 kg/m²     General appearance: No apparent distress appears stated age and cooperative. HEENT Normal cephalic, atraumatic without obvious deformity. Pupils equal, round, and reactive to light. Extra ocular muscles intact. Conjunctivae/corneas clear. Neck: Supple, No jugular venous distention/bruits. Trachea midline without thyromegaly or adenopathy with full range of motion. Lungs: Clear to auscultation, bilaterally without Rales/Wheezes/Rhonchi with good respiratory effort. Heart: Regular rate and rhythm with Normal S1/S2   Abdomen: Soft, non-tender or non-distended without rigidity or guarding and positive bowel sounds   Extremities: No clubbing, cyanosis, or edema bilaterally. Full range of motion without deformity and normal gait intact. Skin: Skin color, texture, turgor normal.  No rashes or lesions. Neurologic: Alert and oriented to self, neurovascularly intact with sensory/motor intact upper extremities/lower extremities, bilaterally.   Cranial nerves: II-XII intact, grossly non-focal.  Mental status: Alert, awake  Capillary Refill: Acceptable  < 3 seconds  Peripheral Pulses: +3 Easily felt, not easily obliterated with pressure      CXR:  I have reviewed the CXR with the following interpretation: b/l PNA    CBC   Recent Labs     11/24/20  1300 11/25/20  0626 11/26/20  0543   WBC 11.2* 7.1 6.3   HGB 14.8 13.7 13.4   HCT 43.3 40.1 38.9   * 100* 104*      RENAL  Recent Labs     11/24/20  2208 11/25/20  0626 11/26/20  0546   * 137 132*   K 4.1 3.9 3.5    105 100   CO2 22 21 23   BUN 16 13 10   CREATININE 0.7 0.7 0.6     LFT'S  Recent Labs     11/24/20  1353   AST 76*   *   BILITOT 0.6   ALKPHOS 66     COAG  No results for input(s): INR in the last 72 hours. CARDIAC ENZYMES  Recent Labs     11/24/20  1353   TROPONINI <0.01       U/A:    Lab Results   Component Value Date    COLORU Yellow 11/24/2020    WBCUA >100 11/24/2020    RBCUA 11-20 11/24/2020    MUCUS Rare 11/09/2020    BACTERIA 3+ 11/24/2020    CLARITYU CLOUDY 11/24/2020    SPECGRAV >=1.030 11/24/2020    LEUKOCYTESUR LARGE 11/24/2020    BLOODU MODERATE 11/24/2020    GLUCOSEU 100 11/24/2020       ABG  No results found for: YNG2GGI, BEART, O3TAKGSK, PHART, THGBART, BIU6DGV, PO2ART, RLA5CJM        Active Hospital Problems    Diagnosis Date Noted    Failure to thrive in adult [R62.7] 11/24/2020         PHYSICIANS CERTIFICATION:    I certify that Leslee Martinez is expected to be hospitalized for > than 2 midnights based on the following assessment and plan:      ASSESSMENT/PLAN:    Multifocal PNA - seen on CXR/CT chest, started on IV abx. Check urine ag legionella/strep ag  COVID 19 positive, but not hypoxic    Acute metabolic encephalopathy - suspect early dementia, worsened with infection    UTI - await urine cx, cont IV abx    Par a fib - cont flecainide/eliquis      DVT Prophylaxis: eliquis  Diet: DIET CARB CONTROL;  Code Status: Full Code  PT/OT Eval Status: ordered    Avery Fernandez MD    Thank you Nagi Bro PA-C for the opportunity to be involved in this patient's care. If you have any questions or concerns please feel free to contact me at [phone].

## 2020-11-26 NOTE — PROGRESS NOTES
Pt resting in bed at this time; continues to endorse c/o feeling weak. No s/s of distress noted. Call light within reach.

## 2020-11-27 VITALS
DIASTOLIC BLOOD PRESSURE: 69 MMHG | BODY MASS INDEX: 20.79 KG/M2 | TEMPERATURE: 98 F | HEART RATE: 89 BPM | HEIGHT: 65 IN | SYSTOLIC BLOOD PRESSURE: 115 MMHG | RESPIRATION RATE: 20 BRPM | OXYGEN SATURATION: 94 % | WEIGHT: 124.78 LBS

## 2020-11-27 LAB
ANION GAP SERPL CALCULATED.3IONS-SCNC: 9 MMOL/L (ref 3–16)
BASOPHILS ABSOLUTE: 0.1 K/UL (ref 0–0.2)
BASOPHILS RELATIVE PERCENT: 1.7 %
BUN BLDV-MCNC: 13 MG/DL (ref 7–20)
CALCIUM SERPL-MCNC: 8.6 MG/DL (ref 8.3–10.6)
CHLORIDE BLD-SCNC: 104 MMOL/L (ref 99–110)
CO2: 23 MMOL/L (ref 21–32)
CREAT SERPL-MCNC: 0.6 MG/DL (ref 0.6–1.2)
EOSINOPHILS ABSOLUTE: 0.1 K/UL (ref 0–0.6)
EOSINOPHILS RELATIVE PERCENT: 1.6 %
GFR AFRICAN AMERICAN: >60
GFR NON-AFRICAN AMERICAN: >60
GLUCOSE BLD-MCNC: 163 MG/DL (ref 70–99)
GLUCOSE BLD-MCNC: 177 MG/DL (ref 70–99)
GLUCOSE BLD-MCNC: 233 MG/DL (ref 70–99)
HCT VFR BLD CALC: 38.9 % (ref 36–48)
HEMOGLOBIN: 13.6 G/DL (ref 12–16)
LYMPHOCYTES ABSOLUTE: 1.1 K/UL (ref 1–5.1)
LYMPHOCYTES RELATIVE PERCENT: 15.8 %
MCH RBC QN AUTO: 34.4 PG (ref 26–34)
MCHC RBC AUTO-ENTMCNC: 34.9 G/DL (ref 31–36)
MCV RBC AUTO: 98.4 FL (ref 80–100)
MONOCYTES ABSOLUTE: 0.6 K/UL (ref 0–1.3)
MONOCYTES RELATIVE PERCENT: 9.1 %
NEUTROPHILS ABSOLUTE: 5.1 K/UL (ref 1.7–7.7)
NEUTROPHILS RELATIVE PERCENT: 71.8 %
PDW BLD-RTO: 11.8 % (ref 12.4–15.4)
PERFORMED ON: ABNORMAL
PERFORMED ON: ABNORMAL
PLATELET # BLD: 103 K/UL (ref 135–450)
PMV BLD AUTO: 10.8 FL (ref 5–10.5)
POTASSIUM SERPL-SCNC: 3.8 MMOL/L (ref 3.5–5.1)
RBC # BLD: 3.95 M/UL (ref 4–5.2)
SODIUM BLD-SCNC: 136 MMOL/L (ref 136–145)
WBC # BLD: 7.1 K/UL (ref 4–11)

## 2020-11-27 PROCEDURE — 80048 BASIC METABOLIC PNL TOTAL CA: CPT

## 2020-11-27 PROCEDURE — 85025 COMPLETE CBC W/AUTO DIFF WBC: CPT

## 2020-11-27 PROCEDURE — 6360000002 HC RX W HCPCS: Performed by: PHYSICIAN ASSISTANT

## 2020-11-27 PROCEDURE — 2580000003 HC RX 258: Performed by: PHYSICIAN ASSISTANT

## 2020-11-27 PROCEDURE — 36415 COLL VENOUS BLD VENIPUNCTURE: CPT

## 2020-11-27 PROCEDURE — 6370000000 HC RX 637 (ALT 250 FOR IP): Performed by: PHYSICIAN ASSISTANT

## 2020-11-27 RX ORDER — LORAZEPAM 0.5 MG/1
0.5 TABLET ORAL 2 TIMES DAILY
Qty: 6 TABLET | Refills: 0 | Status: SHIPPED | OUTPATIENT
Start: 2020-11-27 | End: 2020-11-30

## 2020-11-27 RX ORDER — CEFUROXIME AXETIL 500 MG/1
500 TABLET ORAL 2 TIMES DAILY
Qty: 10 TABLET | Refills: 0 | Status: SHIPPED | OUTPATIENT
Start: 2020-11-27 | End: 2020-12-02

## 2020-11-27 RX ADMIN — FLECAINIDE ACETATE 50 MG: 100 TABLET ORAL at 09:57

## 2020-11-27 RX ADMIN — APIXABAN 2.5 MG: 5 TABLET, FILM COATED ORAL at 09:56

## 2020-11-27 RX ADMIN — LORAZEPAM 0.5 MG: 0.5 TABLET ORAL at 09:56

## 2020-11-27 RX ADMIN — CEFTRIAXONE SODIUM 1 G: 1 INJECTION, POWDER, FOR SOLUTION INTRAMUSCULAR; INTRAVENOUS at 03:11

## 2020-11-27 RX ADMIN — Medication 10 ML: at 10:10

## 2020-11-27 ASSESSMENT — PAIN SCALES - GENERAL: PAINLEVEL_OUTOF10: 0

## 2020-11-27 NOTE — ED PROVIDER NOTES
and may contain errors related to that system including errors in grammar, punctuation, and spelling, as well as words and phrases that may be inappropriate. If there are any questions or concerns please feel free to contact the dictating provider for clarification.          Bud Hastings  11/27/20 0612

## 2020-11-27 NOTE — DISCHARGE INSTR - COC
Continuity of Care Form    Patient Name: Camille Amezcua   :  1930  MRN:  [id number]    Admit date:  2020  Discharge date:  2020    Code Status Order: Full Code   Advance Directives:   [facility] Documentation       Date/Time Healthcare Directive Type of Healthcare Directive Copy in [address] [facility] [address] Agent's Name Healthcare Agent's Phone Number    [phone]  Yes, patient has an advance directive for healthcare treatment  Durable power of  for health care  No, copy requested from family  Adult Gabriele Crocker  --            Admitting Physician:  Arnold Owens MD  PCP: Esteban Blake PA-C    Discharging Nurse: [facility] Unit/Room#: 0211/0211-02  Discharging Unit Phone Number: [phone]    Emergency Contact:   Extended Emergency Contact Information  Primary Emergency Contact: [address] [facility] Phone: [phone]  Mobile Phone: [phone]  Relation: Child  Secondary Emergency Contact: Suzanne Ibarra  Address: [address] [facility] [facility] Phone: [phone]  Mobile Phone: [phone]  Relation: Child    Past Surgical History:  Past Surgical History:   Procedure Laterality Date    CARDIAC CATHETERIZATION      x 4 all normal    CHOLECYSTECTOMY, LAPAROSCOPIC N/A 13    LIVER BIOPSY; EXCISION OF NECROTIC LYMP NODE OF CROQUET; UMBILICAL HERNIA REPAIR    LUNG SURGERY      partial right lower lobectomy    SHOULDER SURGERY      THROAT SURGERY      vocal cord polyp       Immunization History: There is no immunization history for the selected administration types on file for this patient.     Active Problems:  Patient Active Problem List   Diagnosis Code    Contusion of shoulder region S40.019A    Incomplete tear of left rotator cuff M75.112    Hip strain, right, initial encounter S76.011A    Contusion of hip S70.00XA    Closed fracture of head of radius S52.123A    Contusion of elbow S50.00XA    Carpal tunnel syndrome G56.00    Enthesopathy of hip M76.899    Adhesive capsulitis of shoulder M75.00    Chest discomfort R07.89    HTN (hypertension) I10    HLD (hyperlipidemia) E78.5    GERD (gastroesophageal reflux disease) K21.9    Anxiety F41.9    Syncope and collapse R55    Angina pectoris ([facility]) I20.9    Retrocalcaneal bursitis M77.50    Tendonitis, Achilles, right M76.61    Right Achilles tendinitis M76.61    Cancer of cheek ([facility]) C76.0    Chest pain R07.9    Trochanteric bursitis of left hip M70.62    Osteoporosis M81.0    Sprain of left hip S73.102A    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    Major neurocognitive disorder ([facility]) F01.50    Diabetes mellitus ([facility] [address] E11.9    Transaminitis R74.01    CAD (coronary artery disease) I25.10    Visual hallucinations R44.1    Traumatic incomplete tear of left rotator cuff S46.012A    Community acquired pneumonia of right lower lobe of lung J18.9    Pneumonia due to COVID-19 virus U07.1, J12.89    COVID-19 U07.1    Elevated LFTs R79.89    Failure to thrive in adult R62.7       Isolation/Infection:   Isolation            Droplet Plus          Patient Infection Status       Infection Onset Added Last Indicated Last Indicated By Review Planned Expiration Resolved Resolved By    COVID-19 11/10/20 11/10/20 11/24/20 COVID-19 12/03/20 12/08/20      Resolved    COVID-19 Rule Out 11/09/20 11/09/20 11/10/20 COVID-19 (Ordered)   11/10/20 Rule-Out Test Resulted            Nurse Assessment:  Last Vital Signs: /69   Pulse 89   Temp 98 °F (36.7 °C) (Oral)   Resp 20   Ht 5' 5\" (1.651 m)   Wt 124 lb 12.5 oz (56.6 kg)   SpO2 94%   BMI 20.76 kg/m²     Last documented pain score (0-10 scale): Pain Level: 0  Last Weight:   Wt Readings from Last 1 Encounters:   11/24/20 124 lb 12.5 oz (56.6 kg)     Mental Status:  thought processes intact    IV Access:  - None    Nursing · Name:  · Address:  · Dialysis Schedule:  · Phone:  · Fax:    / signature: Electronically signed by Senait Landry RN on 11/27/20 at 1:29 PM EST    PHYSICIAN SECTION    Prognosis: Fair    Condition at Discharge: Stable    Rehab Potential (if transferring to Rehab): Fair    Recommended Labs or Other Treatments After Discharge: NA    Physician Certification: I certify the above information and transfer of Arnulfo Galicia  is necessary for the continuing treatment of the diagnosis listed and that she requires [place] for less 30 days.      Update Admission H&P: Changes in H&P as follows - refer to dc summary    PHYSICIAN SIGNATURE:  Electronically signed by Sil Up MD on 11/27/20 at 1:30 PM EST

## 2020-11-27 NOTE — PROGRESS NOTES
Pt requesting her ativan; reminded that she received it earlier tonight. New IV placed. IV flushing without difficulty. Pt now resting in quiet room.

## 2020-11-27 NOTE — CARE COORDINATION
DISCHARGE ORDER  Date/Time 2020 2:01 PM  Completed by: Marco Gaitan, Case Management    Patient Name: Good Louise    : 1930      Admit order Date and Status:2020 inpt  Noted discharge order. (verify MD's last order for status of admission/Traditional Medicare 3 [place] Inpatient qualifying stay required for SNF)    Confirmed discharge plan with:              Patient:  Yes              When pt confirms DC plan does any support person need to be contacted by CM Yes if yes who_dtr _Robyn____                      Discharge to Facility: [address] [place] [address] phone number for staff giving report: [phone]   Pre-certification completed: waived   Hospital Exemption Notification (HENS) completed: HENS   Discharge orders and Continuity of Care faxed to facility:  yes      Transportation:               Medical Transport explained with choice list offered to pt/family. Choice:No(no preference)  Agency used: [address] [place] [address] Rd up time:   [phone]      Pt/family/Nursing/Facility aware of  time:  Yes Names: pt/dtr Robyn/Sadaf, RN/oJselin @ T  Ambulance form completed:  yes:      Comments:Chart reviewed. TC from Gisel Frausto @ [facility] who states they will accept the pt for STR based on her positive COVID test from 11/10, Gisel Frausto is aware of  time. Spoke to pt via telephone and she remains agreeable to STR @ [facility], pt is aware of  time. TC to dtr Rebeca Cameron and she is aware pf  time. Pt is being d/c'd to [facility] today. Pt's O2 sats are 94% on RA. Discharge timeout done with Sadaf. All discharge needs and concerns addressed. Discharging nurse to complete KAREN, reconcile AVS, and place final copy with patient's discharge packet. Discharging RN to ensure that written prescriptions for  Level II medications are sent with patient to the facility as per protocol.

## 2020-11-27 NOTE — PROGRESS NOTES
Handoff report and transfer of care given to [facility]. Pt in stable condition. Call light within reach. Bed locked in lowest position. Denies further needs at this time.

## 2021-01-04 NOTE — DISCHARGE SUMMARY
Hospital Medicine Discharge Summary    Patient ID: [facility]      Patient's PCP: Olivia Mauricio PA-C    Admit Date: 11/24/2020     Discharge Date: 11/27/2020      Admitting Physician: Margarita Mejia MD     Discharge Physician: Conchis Weinberg MD     Discharge Diagnoses: Active Hospital Problems    Diagnosis    Failure to thrive in adult [R62.7]       The patient was seen and examined on day of discharge and this discharge summary is in conjunction with any daily progress note from day of discharge. Hospital Course: The patient is a 80 y.o. female who presents to [facility] with concern for COVID. Pt appears pleasantly confused. Pt apparently brought in by daughter with concern for COVID since pt has been forgetting to take her meds. Daughter also interested in NH placement.       Multifocal PNA - seen on CXR/CT chest, started on IV abx-->PO on dc. Check urine ag legionella/strep ag  COVID 19 positive, but not hypoxic     Acute metabolic encephalopathy - suspect early dementia, worsened with infection     UTI - reviewed urine cx, cont IV abx-->PO ceftin on dc     Par a fib - cont flecainide/eliquis       Physical Exam Performed:     /69   Pulse 89   Temp 98 °F (36.7 °C) (Oral)   Resp 20   Ht 5' 5\" (1.651 m)   Wt 124 lb 12.5 oz (56.6 kg)   SpO2 94%   BMI 20.76 kg/m²     General appearance: NAD  Lungs: Clear to auscultation, bilaterally without Rales/Wheezes/Rhonchi with good respiratory effort. Heart: Regular rate and rhythm with Normal S1/S2   Abdomen: Soft, non-tender or non-distended without rigidity or guarding and positive bowel sounds   Extremities: No clubbing, cyanosis, or edema bilaterally.  Full range of motion without deformity and normal gait intact. Skin: Skin color, texture, turgor normal.  No rashes or lesions.   Neurologic: Alert and oriented to self, neurovascularly intact with sensory/motor intact upper extremities/lower extremities, bilaterally.  Cranial nerves: II-XII intact, grossly non-focal.  Mental status: Alert, awake  Capillary Refill: Acceptable  < 3 seconds  Peripheral Pulses: +3 Easily felt, not easily obliterated with pressure    Labs: For convenience and continuity at follow-up the following most recent labs are provided:      CBC:    Lab Results   Component Value Date    WBC 7.1 11/27/2020    HGB 13.6 11/27/2020    HCT 38.9 11/27/2020     11/27/2020       Renal:    Lab Results   Component Value Date     11/27/2020    K 3.8 11/27/2020    K 3.9 11/25/2020     11/27/2020    CO2 23 11/27/2020    BUN 13 11/27/2020    CREATININE 0.6 11/27/2020    CALCIUM 8.6 11/27/2020         Significant Diagnostic Studies    Radiology:   CT CHEST PULMONARY EMBOLISM W CONTRAST   Final Result   1. Negative for pulmonary embolus. 2. Multifocal bilateral atypical pneumonia. XR CHEST PORTABLE   Final Result   Small to moderate amount of bibasilar pulmonary opacity worrisome for   pneumonia. If very small bilateral pleural effusions are present, bacterial   infection would be favored. RECOMMENDATION:   CT scan could be performed for more thorough evaluation of the pulmonary   disease. Consults:     IP CONSULT TO CASE MANAGEMENT  IP CONSULT TO HOSPITALIST    Disposition:  home     Condition at Discharge: Stable    Discharge Instructions/Follow-up:  PCP in 1 week    Code Status:  Prior     Activity: activity as tolerated    Diet: regular diet      Discharge Medications:     Discharge Medication List as of 11/27/2020  2:03 PM           Details   cefUROXime (CEFTIN) 500 MG tablet Take 1 tablet by mouth 2 times daily for 5 days, Disp-10 tablet,R-0Normal      !! LORazepam (ATIVAN) 0.5 MG tablet Take 1 tablet by mouth 2 times daily for 3 days. , Disp-6 tablet,R-0Print       !! - Potential duplicate medications found. Please discuss with provider.            Details   flecainide (TAMBOCOR) 50 MG tablet Take 50 mg by mouth 2 times daily Take 1/2 tablet twice dailyHistorical Med      metFORMIN (GLUCOPHAGE) 500 MG tablet Take 500 mg by mouth 2 times daily (with meals)Historical Med      famotidine (PEPCID) 10 MG tablet Take 1 tablet by mouth 2 times daily, Disp-60 tablet,R-0Print      diclofenac sodium (VOLTAREN) 1 % GEL APPLY 4 G TOPICALLY 4 TIMES DAILY, Topical, 4 TIMES DAILY Starting u 7/2/2020, Until Sat 8/1/2020, For 30 days, Disp-500 g,R-1, Normal      ELIQUIS 2.5 MG TABS tablet 2.5 mg 2 times daily , DAWHistorical Med      fenofibrate (TRICOR) 145 MG tablet fenofibrate nanocrystallized 145 mg tabletHistorical Med      atorvastatin (LIPITOR) 20 MG tablet Take 1 tablet by mouth nightly, Disp-30 tablet, R-1Normal      !! LORazepam (ATIVAN) 0.5 MG tablet Take 0.5 mg by mouth 2 times daily. Hyacinth Vicente Historical Med      glimepiride (AMARYL) 4 MG tablet Take 4 mg by mouth daily as needed Take before breakfast for blood sugar greater than 200. Historical Med      esomeprazole Magnesium (NEXIUM) 20 MG PACK Take 20 mg by mouth dailyHistorical Med      Multiple Vitamins-Minerals (THERAPEUTIC MULTIVITAMIN-MINERALS) tablet Take 1 tablet by mouth dailyHistorical Med      B Complex Vitamins (B COMPLEX 100 PO) Take by mouthHistorical Med      TRUETEST TEST strip Starting 7/29/2015, Until Discontinued, R-10, Historical Med      MAG-G 500 (27 MG) MG TABS tablet , R-6      KLOR-CON 10 10 MEQ tablet       vitamin B-12 (CYANOCOBALAMIN) 1000 MCG tablet Take 1,000 mcg by mouth daily. !! - Potential duplicate medications found. Please discuss with provider. Time Spent on discharge is more than 30 minutes in the examination, evaluation, counseling and review of medications and discharge plan. Signed:    Rogelio Payne MD   1/4/2021      Thank you Shivani Chapman PA-C for the opportunity to be involved in this patient's care. If you have any questions or concerns please feel free to contact me at [phone].

## 2021-01-28 ENCOUNTER — OFFICE VISIT (OUTPATIENT)
Dept: ORTHOPEDIC SURGERY | Age: 86
End: 2021-01-28
Payer: COMMERCIAL

## 2021-01-28 VITALS — BODY MASS INDEX: 20.66 KG/M2 | HEIGHT: 65 IN | WEIGHT: 124 LBS

## 2021-01-28 DIAGNOSIS — M70.62 TROCHANTERIC BURSITIS OF LEFT HIP: Primary | ICD-10-CM

## 2021-01-28 DIAGNOSIS — M19.212 SECONDARY OSTEOARTHRITIS OF LEFT SHOULDER DUE TO ROTATOR CUFF ARTHROPATHY: ICD-10-CM

## 2021-01-28 PROCEDURE — 99213 OFFICE O/P EST LOW 20 MIN: CPT | Performed by: ORTHOPAEDIC SURGERY

## 2021-01-28 NOTE — PROGRESS NOTES
Chief Complaint:  Hip Pain ([facility] LT HIP)      SUBJECTIVE:  Imani Berry is a 80 y.o. female who returns today for evaluation of left hip and left shoulder, she has been on long-standing patient of Dr. Guerita Chin and has been under his care for workman's comp injury that occurred in , and his long-term she is being referred to me to continue treatment. Treatment to date includes Voltaren cream which seems to alleviate her symptoms. Patient is also well known to me as I treated her in the past for lower extremity weakness, and also treated her   for knee osteoarthritis. Today she has 1 out of 10 pain in the hip and shoulder she has significant limitations with using the left shoulder secondary to pseudoparalysis  She has a new chair that help stretch out the hip which has significantly improved her symptoms. Is unable to reach her arm behind her back, is able to reach her hand to her mouth but not the top of her head. Patient now resides at a nursing facility, she does not have any assistive devices with her today. Pain Assessment:         OBJECTIVE:  Vital Signs:  Vitals:    21 1011   Weight: 124 lb (56.2 kg)   Height: 5' 5\" (1.651 m)       Appearance: alert, well appearing, and in no distress, oriented to person, place, and time and normal appearing weight. Physical exam:   Her physical exam remains unchanged, continues to have approximately 45 degrees of active forward flexion, she is able to reach her mouth and on the top of her head. She has no internal rotation to reach behind her back. Distal neurovascular exam is intact. 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 No orders of the defined types were placed in this encounter. Treatment Plan:  continue with the conservative treatment. She'll follow up with me every 6 months for reevaluation. Patient agrees with this plan, all of their questions were answered best of our ability and to their satisfaction.       Lona Ma

## 2022-03-03 ENCOUNTER — HOSPITAL ENCOUNTER (EMERGENCY)
Age: 87
Discharge: HOME | End: 2022-03-03
Attending: EMERGENCY MEDICINE
Payer: MEDICARE

## 2022-03-03 ENCOUNTER — APPOINTMENT (OUTPATIENT)
Dept: GENERAL RADIOLOGY | Age: 87
End: 2022-03-03
Payer: MEDICARE

## 2022-03-03 VITALS
DIASTOLIC BLOOD PRESSURE: 56 MMHG | TEMPERATURE: 98.2 F | HEART RATE: 65 BPM | RESPIRATION RATE: 16 BRPM | BODY MASS INDEX: 20.63 KG/M2 | SYSTOLIC BLOOD PRESSURE: 159 MMHG | WEIGHT: 124 LBS | OXYGEN SATURATION: 97 %

## 2022-03-03 DIAGNOSIS — R07.89 ATYPICAL CHEST PAIN: Primary | ICD-10-CM

## 2022-03-03 LAB
A/G RATIO: 1.6 (ref 1.1–2.2)
ALBUMIN SERPL-MCNC: 4.1 G/DL (ref 3.4–5)
ALP BLD-CCNC: 37 U/L (ref 40–129)
ALT SERPL-CCNC: 12 U/L (ref 10–40)
ANION GAP SERPL CALCULATED.3IONS-SCNC: 14 MMOL/L (ref 3–16)
AST SERPL-CCNC: 26 U/L (ref 15–37)
BASOPHILS ABSOLUTE: 0.1 K/UL (ref 0–0.2)
BASOPHILS RELATIVE PERCENT: 1.7 %
BILIRUB SERPL-MCNC: 0.6 MG/DL (ref 0–1)
BUN BLDV-MCNC: 20 MG/DL (ref 7–20)
CALCIUM SERPL-MCNC: 9.9 MG/DL (ref 8.3–10.6)
CHLORIDE BLD-SCNC: 107 MMOL/L (ref 99–110)
CO2: 20 MMOL/L (ref 21–32)
CREAT SERPL-MCNC: 1 MG/DL (ref 0.6–1.2)
EKG ATRIAL RATE: 55 BPM
EKG DIAGNOSIS: NORMAL
EKG Q-T INTERVAL: 432 MS
EKG QRS DURATION: 146 MS
EKG QTC CALCULATION (BAZETT): 479 MS
EKG R AXIS: -36 DEGREES
EKG T AXIS: 112 DEGREES
EKG VENTRICULAR RATE: 74 BPM
EOSINOPHILS ABSOLUTE: 0.2 K/UL (ref 0–0.6)
EOSINOPHILS RELATIVE PERCENT: 3.2 %
GFR AFRICAN AMERICAN: >60
GFR NON-AFRICAN AMERICAN: 52
GLUCOSE BLD-MCNC: 208 MG/DL (ref 70–99)
HCT VFR BLD CALC: 36.4 % (ref 36–48)
HEMOGLOBIN: 12.6 G/DL (ref 12–16)
LYMPHOCYTES ABSOLUTE: 2 K/UL (ref 1–5.1)
LYMPHOCYTES RELATIVE PERCENT: 38 %
MCH RBC QN AUTO: 33.5 PG (ref 26–34)
MCHC RBC AUTO-ENTMCNC: 34.6 G/DL (ref 31–36)
MCV RBC AUTO: 96.8 FL (ref 80–100)
MONOCYTES ABSOLUTE: 0.4 K/UL (ref 0–1.3)
MONOCYTES RELATIVE PERCENT: 7.9 %
NEUTROPHILS ABSOLUTE: 2.5 K/UL (ref 1.7–7.7)
NEUTROPHILS RELATIVE PERCENT: 49.2 %
PDW BLD-RTO: 12.2 % (ref 12.4–15.4)
PLATELET # BLD: 112 K/UL (ref 135–450)
PMV BLD AUTO: 10.7 FL (ref 5–10.5)
POTASSIUM SERPL-SCNC: 4 MMOL/L (ref 3.5–5.1)
RBC # BLD: 3.76 M/UL (ref 4–5.2)
SODIUM BLD-SCNC: 141 MMOL/L (ref 136–145)
TOTAL PROTEIN: 6.6 G/DL (ref 6.4–8.2)
TROPONIN: <0.01 NG/ML
WBC # BLD: 5.1 K/UL (ref 4–11)

## 2022-03-03 PROCEDURE — 80053 COMPREHEN METABOLIC PANEL: CPT

## 2022-03-03 PROCEDURE — 85025 COMPLETE CBC W/AUTO DIFF WBC: CPT

## 2022-03-03 PROCEDURE — 93010 ELECTROCARDIOGRAM REPORT: CPT | Performed by: INTERNAL MEDICINE

## 2022-03-03 PROCEDURE — 93005 ELECTROCARDIOGRAM TRACING: CPT | Performed by: EMERGENCY MEDICINE

## 2022-03-03 PROCEDURE — 36415 COLL VENOUS BLD VENIPUNCTURE: CPT

## 2022-03-03 PROCEDURE — 99285 EMERGENCY DEPT VISIT HI MDM: CPT

## 2022-03-03 PROCEDURE — 71045 X-RAY EXAM CHEST 1 VIEW: CPT

## 2022-03-03 PROCEDURE — 84484 ASSAY OF TROPONIN QUANT: CPT

## 2022-03-03 RX ORDER — BRIMONIDINE TARTRATE 2 MG/ML
1 SOLUTION/ DROPS OPHTHALMIC 2 TIMES DAILY
COMMUNITY

## 2022-03-03 RX ORDER — ACETAMINOPHEN 325 MG/1
650 TABLET ORAL EVERY 6 HOURS PRN
COMMUNITY

## 2022-03-03 RX ORDER — DIGOXIN 125 MCG
125 TABLET ORAL
COMMUNITY

## 2022-03-03 RX ORDER — BISACODYL 10 MG
10 SUPPOSITORY, RECTAL RECTAL DAILY
COMMUNITY

## 2022-03-03 RX ORDER — DORZOLAMIDE HCL 20 MG/ML
1 SOLUTION/ DROPS OPHTHALMIC 2 TIMES DAILY
COMMUNITY

## 2022-03-03 RX ORDER — BUSPIRONE HYDROCHLORIDE 5 MG/1
5 TABLET ORAL 2 TIMES DAILY
COMMUNITY

## 2022-03-03 RX ORDER — LATANOPROST 50 UG/ML
1 SOLUTION/ DROPS OPHTHALMIC DAILY
COMMUNITY

## 2022-03-03 ASSESSMENT — ENCOUNTER SYMPTOMS
DIARRHEA: 0
CHEST TIGHTNESS: 0
SHORTNESS OF BREATH: 0
CONSTIPATION: 0
COUGH: 0
ABDOMINAL PAIN: 0
CHOKING: 0
RECTAL PAIN: 0
NAUSEA: 0
WHEEZING: 0
STRIDOR: 0

## 2022-03-03 ASSESSMENT — PAIN DESCRIPTION - PAIN TYPE: TYPE: ACUTE PAIN

## 2022-03-03 ASSESSMENT — PAIN SCALES - GENERAL: PAINLEVEL_OUTOF10: 9

## 2022-03-03 ASSESSMENT — PAIN - FUNCTIONAL ASSESSMENT: PAIN_FUNCTIONAL_ASSESSMENT: 0-10

## 2022-03-03 NOTE — ED NOTES
AVS provided and reviewed with the patient and her daughter. The patient and her daughter verbalized understanding of care at home, follow up care, and emergent symptoms to return for. No questions or concerns verbalized at this time. The patient's daughter states that she will transport the patient back to the ECF. The patient is alert, oriented, and assisted out of the department.      Xuan Lacy RN  03/03/22 [id number]

## 2022-03-03 NOTE — ED PROVIDER NOTES
[facility]      Pt Name: Marian Francis  MRN: [id number]  [place] 1/7/1930  Date of evaluation: 3/3/2022  Provider: Markus Nielsen MD    CHIEF COMPLAINT       Chief Complaint   Patient presents with    Chest Pain     Pt from the [facility] for chest pain for about 1 week worse today. HISTORY OF PRESENT ILLNESS   (Location/Symptom, Timing/Onset, Context/Setting, Quality, Duration, Modifying Factors, Severity)  Note limiting factors. Marian Francis is a 80 y.o. female who presents to the emergency department     This patient presents emergency department from an extended care facility  Patient has very significant dementia she is pleasantly confused. Patient does ramble on she is talking fluently without signs of distress she apparently has had some intermittent chest discomfort for 1 week but apparently was worse today  We do know that she has normal coronary arteries by heart cath. She is had a work-up before for pulmonary embolism which has been negative. She has no signs of a complete dysphagia or esophageal obstruction she is swallowing her saliva well. She has no calf tenderness or swelling  Her vital signs are reviewed and are stable  Her description of her pain is unreliable and not indicative of anything acutely going on monitor reveals sinus rhythm  Stat EKG shows a left bundle which she has had before  There is no scar Bosa's criteria  Patient at this point we will check some cardiac enzymes EKG which shows the left bundle as mentioned but no other acute changes we will also proceed with chest x-ray and cardiac enzymes    We do know that the patient has been on flecainide, digoxin, and Eliquis so we suspect that she may have a history of underlying atrial fib in the past    The history is provided by the patient. Nursing Notes were reviewed.     REVIEW OF SYSTEMS    (2-9 systems for level 4, 10 or more for level 5)     Review of Systems Constitutional: Positive for activity change and appetite change. HENT: Negative for congestion and hearing loss. Eyes: Negative for visual disturbance. Respiratory: Negative for cough, choking, chest tightness, shortness of breath, wheezing and stridor. Cardiovascular: Positive for chest pain. Negative for palpitations and leg swelling. Gastrointestinal: Negative for abdominal pain, constipation, diarrhea, nausea and rectal pain. Genitourinary: Negative for difficulty urinating. Allergic/Immunologic: Negative for immunocompromised state. Neurological: Negative for light-headedness and numbness. All other systems reviewed and are negative. Except as noted above the remainder of the review of systems was reviewed and negative.        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 7/16/2019    TB (pulmonary tuberculosis) 2010         SURGICAL HISTORY       Past Surgical History:   Procedure Laterality Date    CARDIAC CATHETERIZATION      x 4 all normal    CHOLECYSTECTOMY, LAPAROSCOPIC N/A 5/1/13    LIVER BIOPSY; EXCISION OF NECROTIC LYMP NODE OF CROQUET; UMBILICAL HERNIA REPAIR    LUNG SURGERY      partial right lower lobectomy    SHOULDER SURGERY      THROAT SURGERY      vocal cord polyp         CURRENT MEDICATIONS       Previous Medications    ACETAMINOPHEN (TYLENOL) 325 MG TABLET    Take 650 mg by mouth every 6 hours as needed for Pain    ATORVASTATIN (LIPITOR) 20 MG TABLET    Take 1 tablet by mouth nightly    B COMPLEX VITAMINS (B COMPLEX 100 PO)    Take by mouth    BISACODYL (DULCOLAX) 10 MG SUPPOSITORY    Place 10 mg rectally daily BRIMONIDINE (ALPHAGAN) 0.2 % OPHTHALMIC SOLUTION    Place 1 drop into both eyes 2 times daily    BUSPIRONE (BUSPAR) 5 MG TABLET    Take 5 mg by mouth 2 times daily    DICLOFENAC SODIUM (VOLTAREN) 1 % GEL    APPLY 4 G TOPICALLY 4 TIMES DAILY    DIGOXIN (LANOXIN) 125 MCG TABLET    Take 125 mcg by mouth four times a week Every Mon, Wed, Fri, Sat. DORZOLAMIDE (TRUSOPT) 2 % OPHTHALMIC SOLUTION    Place 1 drop into both eyes 2 times daily at 0800 and 1400    ELIQUIS 2.5 MG TABS TABLET    2.5 mg 2 times daily     ESOMEPRAZOLE MAGNESIUM (NEXIUM) 20 MG PACK    Take 20 mg by mouth daily    FAMOTIDINE (PEPCID) 10 MG TABLET    Take 1 tablet by mouth 2 times daily    FENOFIBRATE (TRICOR) 145 MG TABLET    200 mg     FLECAINIDE (TAMBOCOR) 50 MG TABLET    Take 50 mg by mouth 2 times daily Take 1/2 tablet twice daily    GLIMEPIRIDE (AMARYL) 4 MG TABLET    Take 4 mg by mouth daily as needed Take before breakfast for blood sugar greater than 200. KLOR-CON 10 10 MEQ TABLET        LATANOPROST (XALATAN) 0.005 % OPHTHALMIC SOLUTION    Place 1 drop into both eyes daily    LORAZEPAM (ATIVAN) 0.5 MG TABLET    Take 0.5 mg by mouth 2 times daily. Meryle Sandman MAG-G 500 (27 MG) MG TABS TABLET    400 mg     MAGNESIUM HYDROXIDE (MILK OF MAGNESIA) 400 MG/5ML SUSPENSION    Take 30 mLs by mouth daily as needed for Constipation    METFORMIN (GLUCOPHAGE) 500 MG TABLET    Take 500 mg by mouth 2 times daily (with meals)    MULTIPLE VITAMINS-MINERALS (THERAPEUTIC MULTIVITAMIN-MINERALS) TABLET    Take 1 tablet by mouth daily    TRUETEST TEST STRIP        VITAMIN B-12 (CYANOCOBALAMIN) 1000 MCG TABLET    Take 1,000 mcg by mouth daily. ALLERGIES     Other, Penicillins, Polysporin [bacitracin-polymyxin b], and Adhesive tape    FAMILY HISTORY     History reviewed. No pertinent family history. SOCIAL HISTORY       Social History     Socioeconomic History    Marital status:       Spouse name: None    Number of children: None    Years of education: None    Highest education level: None   Occupational History    Occupation: retired    Tobacco Use    Smoking status: Never Smoker    Smokeless tobacco: Never Used   Vaping Use    Vaping Use: Never used   Substance and Sexual Activity    Alcohol use: No     Alcohol/week: 0.0 standard drinks    Drug use: No    Sexual activity: Not Currently     Partners: Male   Other Topics Concern    None   Social History Narrative    None     Social Determinants of Health     Financial Resource Strain:     Difficulty of Paying Living Expenses: Not on file   Food Insecurity:     Worried About Running Out of Food in the Last Year: Not on file    Kwasi of Food in the Last Year: Not on file   Transportation Needs:     Lack of Transportation (Medical): Not on file    Lack of Transportation (Non-Medical):  Not on file   Physical Activity:     Days of Exercise per Week: Not on file    Minutes of Exercise per Session: Not on file   Stress:     Feeling of Stress : Not on file   Social Connections:     Frequency of Communication with Friends and Family: Not on file    Frequency of Social Gatherings with Friends and Family: Not on file    Attends Confucianism Services: Not on file    Active Member of [address] [place] [address] or Organizations: Not on file    Attends Club or Organization Meetings: Not on file    Marital Status: Not on file   Intimate Partner Violence:     Fear of Current or Ex-Partner: Not on file    Emotionally Abused: Not on file    Physically Abused: Not on file    Sexually Abused: Not on file   Housing Stability:     Unable to Pay for Housing in the Last Year: Not on file    Number of [place] in the Last Year: Not on file    Unstable Housing in the Last Year: Not on file       SCREENINGS    Gisela Coma Scale  Eye Opening: Spontaneous  Best Verbal Response: Oriented  Best Motor Response: Obeys commands  Parker Coma Scale Score: 15          PHYSICAL EXAM    (up to 7 for level 4, 8 or more for level sinus  Patient has a left bundle branch pattern  No ischemia no scar Bosa's criteria  No other abnormalities noted      RADIOLOGY:   Non-plain film images such as CT, Ultrasound and MRI are read by the radiologist. Plain radiographic images are visualized and preliminarily interpreted by the emergency physician with the below findings:        Interpretation per the Radiologist below, if available at the time of this note:    XR CHEST PORTABLE    (Results Pending)           LABS:  Results for orders placed or performed during the hospital encounter of 03/03/22   CBC with Auto Differential   Result Value Ref Range    WBC 5.1 4.0 - 11.0 K/uL    RBC 3.76 (L) 4.00 - 5.20 M/uL    Hemoglobin 12.6 12.0 - 16.0 g/dL    Hematocrit 36.4 36.0 - 48.0 %    MCV 96.8 80.0 - 100.0 fL    MCH 33.5 26.0 - 34.0 pg    MCHC 34.6 31.0 - 36.0 g/dL    RDW 12.2 (L) 12.4 - 15.4 %    Platelets 559 (L) 228 - 450 K/uL    MPV 10.7 (H) 5.0 - 10.5 fL    Neutrophils % 49.2 %    Lymphocytes % 38.0 %    Monocytes % 7.9 %    Eosinophils % 3.2 %    Basophils % 1.7 %    Neutrophils Absolute 2.5 1.7 - 7.7 K/uL    Lymphocytes Absolute 2.0 1.0 - 5.1 K/uL    Monocytes Absolute 0.4 0.0 - 1.3 K/uL    Eosinophils Absolute 0.2 0.0 - 0.6 K/uL    Basophils Absolute 0.1 0.0 - 0.2 K/uL   Comprehensive Metabolic Panel   Result Value Ref Range    Sodium 141 136 - 145 mmol/L    Potassium 4.0 3.5 - 5.1 mmol/L    Chloride 107 99 - 110 mmol/L    CO2 20 (L) 21 - 32 mmol/L    Anion Gap 14 3 - 16    Glucose 208 (H) 70 - 99 mg/dL    BUN 20 7 - 20 mg/dL    CREATININE 1.0 0.6 - 1.2 mg/dL    GFR Non-African American 52 (A) >60    GFR African American >60 >60    Calcium 9.9 8.3 - 10.6 mg/dL    Total Protein 6.6 6.4 - 8.2 g/dL    Albumin 4.1 3.4 - 5.0 g/dL    Albumin/Globulin Ratio 1.6 1.1 - 2.2    Total Bilirubin 0.6 0.0 - 1.0 mg/dL    Alkaline Phosphatase 37 (L) 40 - 129 U/L    ALT 12 10 - 40 U/L    AST 26 15 - 37 U/L   Troponin   Result Value Ref Range    Troponin <0.01 <0.01 ng/mL   EKG 12 Lead   Result Value Ref Range    Ventricular Rate 74 BPM    Atrial Rate 55 BPM    QRS Duration 146 ms    Q-T Interval 432 ms    QTc Calculation (Bazett) 479 ms    R Axis -36 degrees    T Axis 112 degrees    Diagnosis       Wide QRS rhythm with occasional Premature ventricular complexesLeft axis deviationLeft bundle branch blockAbnormal ECGNo previous ECGs available            EMERGENCY DEPARTMENT COURSE and DIFFERENTIAL DIAGNOSIS/MDM:     Vitals:    03/03/22 0949   BP: (!) 157/64   Pulse: 68   Resp: 16   Temp: 98.2 °F (36.8 °C)   TempSrc: Oral   SpO2: 98%   Weight: 124 lb (56.2 kg)           MDM      REASSESSMENT          CRITICAL CARE TIME     CONSULTS:  None      PROCEDURES:     Procedures    MEDICATIONS GIVEN THIS VISIT:  Medications - No data to display     FINAL IMPRESSION      1. Atypical chest pain        Patient's EKG is really normal showing no acute changes with her left bundle branch her pain is very atypical patient's pain is none diagnostic of coronary artery disease therefore she was reassured and she advised to proceed back to her extended care facility    DISPOSITION/PLAN   DISPOSITION Decision To Discharge 03/03/2022 10:31:42 AM      PATIENT REFERRED TO:  Daiana Jefferson      As needed      DISCHARGE MEDICATIONS:  New Prescriptions    No medications on file       Controlled Substances Monitoring  No flowsheet data found. (Please note that portions of this note were completed with a voice recognition program.  Efforts were made to edit the dictations but occasionally words are mis-transcribed.)    Patient was advised to return to the Emergency Department if there was any worsening.     Arley Dominguez MD (electronically signed)  Attending Emergency Physician          Kayla Ritter MD  03/03/22 [id number]

## 2022-07-06 ENCOUNTER — HOSPITAL ENCOUNTER (OUTPATIENT)
Dept: CT IMAGING | Age: 87
Discharge: HOME | End: 2022-07-06
Payer: MEDICARE

## 2022-07-06 DIAGNOSIS — M25.551 RIGHT HIP PAIN: ICD-10-CM

## 2022-07-06 DIAGNOSIS — M54.50 LOW BACK PAIN, UNSPECIFIED BACK PAIN LATERALITY, UNSPECIFIED CHRONICITY, UNSPECIFIED WHETHER SCIATICA PRESENT: ICD-10-CM

## 2022-07-06 PROCEDURE — 73700 CT LOWER EXTREMITY W/O DYE: CPT

## 2022-07-06 PROCEDURE — 72131 CT LUMBAR SPINE W/O DYE: CPT

## 2022-12-15 ENCOUNTER — HOSPITAL ENCOUNTER (OUTPATIENT)
Age: 87
Setting detail: OBSERVATION
LOS: 1 days | Discharge: NURSING, REHAB OR LONG-TERM CARE | End: 2022-12-16
Attending: EMERGENCY MEDICINE | Admitting: INTERNAL MEDICINE
Payer: MEDICARE

## 2022-12-15 ENCOUNTER — APPOINTMENT (OUTPATIENT)
Dept: GENERAL RADIOLOGY | Age: 87
End: 2022-12-15
Payer: MEDICARE

## 2022-12-15 DIAGNOSIS — R07.9 ACUTE CHEST PAIN: Primary | ICD-10-CM

## 2022-12-15 DIAGNOSIS — F41.9 CHRONIC ANXIETY: ICD-10-CM

## 2022-12-15 LAB
A/G RATIO: 1.6 (ref 1.1–2.2)
ALBUMIN SERPL-MCNC: 4.3 G/DL (ref 3.4–5)
ALP BLD-CCNC: 34 U/L (ref 40–129)
ALT SERPL-CCNC: 12 U/L (ref 10–40)
ANION GAP SERPL CALCULATED.3IONS-SCNC: 15 MMOL/L (ref 3–16)
AST SERPL-CCNC: 22 U/L (ref 15–37)
BASOPHILS ABSOLUTE: 0.1 K/UL (ref 0–0.2)
BASOPHILS RELATIVE PERCENT: 2 %
BILIRUB SERPL-MCNC: 0.3 MG/DL (ref 0–1)
BUN BLDV-MCNC: 30 MG/DL (ref 7–20)
CALCIUM SERPL-MCNC: 10.3 MG/DL (ref 8.3–10.6)
CHLORIDE BLD-SCNC: 102 MMOL/L (ref 99–110)
CO2: 19 MMOL/L (ref 21–32)
CREAT SERPL-MCNC: 1.3 MG/DL (ref 0.6–1.2)
DIGOXIN LEVEL: 0.7 NG/ML (ref 0.8–2)
EKG ATRIAL RATE: 92 BPM
EKG DIAGNOSIS: NORMAL
EKG Q-T INTERVAL: 382 MS
EKG QRS DURATION: 138 MS
EKG QTC CALCULATION (BAZETT): 490 MS
EKG R AXIS: -27 DEGREES
EKG T AXIS: 124 DEGREES
EKG VENTRICULAR RATE: 99 BPM
EOSINOPHILS ABSOLUTE: 0.3 K/UL (ref 0–0.6)
EOSINOPHILS RELATIVE PERCENT: 5 %
GFR SERPL CREATININE-BSD FRML MDRD: 38 ML/MIN/{1.73_M2}
GLUCOSE BLD-MCNC: 253 MG/DL (ref 70–99)
GLUCOSE BLD-MCNC: 359 MG/DL (ref 70–99)
HCT VFR BLD CALC: 40.3 % (ref 36–48)
HEMOGLOBIN: 13.6 G/DL (ref 12–16)
LYMPHOCYTES ABSOLUTE: 1.9 K/UL (ref 1–5.1)
LYMPHOCYTES RELATIVE PERCENT: 28.7 %
MCH RBC QN AUTO: 32.8 PG (ref 26–34)
MCHC RBC AUTO-ENTMCNC: 33.7 G/DL (ref 31–36)
MCV RBC AUTO: 97.3 FL (ref 80–100)
MONOCYTES ABSOLUTE: 0.5 K/UL (ref 0–1.3)
MONOCYTES RELATIVE PERCENT: 7.6 %
NEUTROPHILS ABSOLUTE: 3.8 K/UL (ref 1.7–7.7)
NEUTROPHILS RELATIVE PERCENT: 56.7 %
PDW BLD-RTO: 12.9 % (ref 12.4–15.4)
PERFORMED ON: ABNORMAL
PLATELET # BLD: 147 K/UL (ref 135–450)
PMV BLD AUTO: 10.3 FL (ref 5–10.5)
POTASSIUM REFLEX MAGNESIUM: 4.7 MMOL/L (ref 3.5–5.1)
RAPID INFLUENZA  B AGN: NEGATIVE
RAPID INFLUENZA A AGN: NEGATIVE
RBC # BLD: 4.14 M/UL (ref 4–5.2)
SARS-COV-2, NAAT: NOT DETECTED
SODIUM BLD-SCNC: 136 MMOL/L (ref 136–145)
TOTAL PROTEIN: 7 G/DL (ref 6.4–8.2)
TROPONIN: <0.01 NG/ML
WBC # BLD: 6.7 K/UL (ref 4–11)

## 2022-12-15 PROCEDURE — 6370000000 HC RX 637 (ALT 250 FOR IP): Performed by: EMERGENCY MEDICINE

## 2022-12-15 PROCEDURE — 2580000003 HC RX 258

## 2022-12-15 PROCEDURE — 36415 COLL VENOUS BLD VENIPUNCTURE: CPT

## 2022-12-15 PROCEDURE — 2580000003 HC RX 258: Performed by: EMERGENCY MEDICINE

## 2022-12-15 PROCEDURE — 93010 ELECTROCARDIOGRAM REPORT: CPT | Performed by: INTERNAL MEDICINE

## 2022-12-15 PROCEDURE — 6370000000 HC RX 637 (ALT 250 FOR IP)

## 2022-12-15 PROCEDURE — 96360 HYDRATION IV INFUSION INIT: CPT

## 2022-12-15 PROCEDURE — 87635 SARS-COV-2 COVID-19 AMP PRB: CPT

## 2022-12-15 PROCEDURE — 96361 HYDRATE IV INFUSION ADD-ON: CPT

## 2022-12-15 PROCEDURE — 84484 ASSAY OF TROPONIN QUANT: CPT

## 2022-12-15 PROCEDURE — 93005 ELECTROCARDIOGRAM TRACING: CPT | Performed by: EMERGENCY MEDICINE

## 2022-12-15 PROCEDURE — 85025 COMPLETE CBC W/AUTO DIFF WBC: CPT

## 2022-12-15 PROCEDURE — 80053 COMPREHEN METABOLIC PANEL: CPT

## 2022-12-15 PROCEDURE — 2060000000 HC ICU INTERMEDIATE R&B

## 2022-12-15 PROCEDURE — 71045 X-RAY EXAM CHEST 1 VIEW: CPT

## 2022-12-15 PROCEDURE — 80162 ASSAY OF DIGOXIN TOTAL: CPT

## 2022-12-15 PROCEDURE — 83036 HEMOGLOBIN GLYCOSYLATED A1C: CPT

## 2022-12-15 PROCEDURE — 99285 EMERGENCY DEPT VISIT HI MDM: CPT

## 2022-12-15 PROCEDURE — 87804 INFLUENZA ASSAY W/OPTIC: CPT

## 2022-12-15 RX ORDER — CETIRIZINE HYDROCHLORIDE 10 MG/1
10 TABLET, CHEWABLE ORAL DAILY
COMMUNITY

## 2022-12-15 RX ORDER — SODIUM CHLORIDE 9 MG/ML
INJECTION, SOLUTION INTRAVENOUS PRN
Status: DISCONTINUED | OUTPATIENT
Start: 2022-12-15 | End: 2022-12-16 | Stop reason: HOSPADM

## 2022-12-15 RX ORDER — DORZOLAMIDE HCL 20 MG/ML
1 SOLUTION/ DROPS OPHTHALMIC 2 TIMES DAILY
Status: DISCONTINUED | OUTPATIENT
Start: 2022-12-16 | End: 2022-12-16 | Stop reason: HOSPADM

## 2022-12-15 RX ORDER — BUSPIRONE HYDROCHLORIDE 5 MG/1
5 TABLET ORAL 2 TIMES DAILY
Status: DISCONTINUED | OUTPATIENT
Start: 2022-12-15 | End: 2022-12-16 | Stop reason: HOSPADM

## 2022-12-15 RX ORDER — INSULIN LISPRO 100 [IU]/ML
0-4 INJECTION, SOLUTION INTRAVENOUS; SUBCUTANEOUS NIGHTLY
Status: DISCONTINUED | OUTPATIENT
Start: 2022-12-15 | End: 2022-12-16 | Stop reason: HOSPADM

## 2022-12-15 RX ORDER — FLECAINIDE ACETATE 50 MG/1
25 TABLET ORAL 2 TIMES DAILY
Status: DISCONTINUED | OUTPATIENT
Start: 2022-12-15 | End: 2022-12-16

## 2022-12-15 RX ORDER — POTASSIUM CHLORIDE 20 MEQ/1
40 TABLET, EXTENDED RELEASE ORAL PRN
Status: DISCONTINUED | OUTPATIENT
Start: 2022-12-15 | End: 2022-12-15

## 2022-12-15 RX ORDER — POLYETHYLENE GLYCOL 3350 17 G/17G
17 POWDER, FOR SOLUTION ORAL DAILY PRN
Status: DISCONTINUED | OUTPATIENT
Start: 2022-12-15 | End: 2022-12-16 | Stop reason: HOSPADM

## 2022-12-15 RX ORDER — LATANOPROST 50 UG/ML
1 SOLUTION/ DROPS OPHTHALMIC DAILY
Status: DISCONTINUED | OUTPATIENT
Start: 2022-12-16 | End: 2022-12-16 | Stop reason: HOSPADM

## 2022-12-15 RX ORDER — ASPIRIN 81 MG/1
324 TABLET, CHEWABLE ORAL ONCE
Status: COMPLETED | OUTPATIENT
Start: 2022-12-15 | End: 2022-12-15

## 2022-12-15 RX ORDER — ACETAMINOPHEN 650 MG/1
650 SUPPOSITORY RECTAL EVERY 6 HOURS PRN
Status: DISCONTINUED | OUTPATIENT
Start: 2022-12-15 | End: 2022-12-16 | Stop reason: HOSPADM

## 2022-12-15 RX ORDER — INSULIN ASPART 100 [IU]/ML
INJECTION, SOLUTION INTRAVENOUS; SUBCUTANEOUS 3 TIMES DAILY
COMMUNITY

## 2022-12-15 RX ORDER — MAGNESIUM SULFATE 1 G/100ML
1000 INJECTION INTRAVENOUS PRN
Status: DISCONTINUED | OUTPATIENT
Start: 2022-12-15 | End: 2022-12-15

## 2022-12-15 RX ORDER — DEXTROSE MONOHYDRATE 100 MG/ML
INJECTION, SOLUTION INTRAVENOUS CONTINUOUS PRN
Status: DISCONTINUED | OUTPATIENT
Start: 2022-12-15 | End: 2022-12-16 | Stop reason: HOSPADM

## 2022-12-15 RX ORDER — ASPIRIN 81 MG/1
81 TABLET, CHEWABLE ORAL DAILY
Status: DISCONTINUED | OUTPATIENT
Start: 2022-12-16 | End: 2022-12-16 | Stop reason: HOSPADM

## 2022-12-15 RX ORDER — DIGOXIN 125 MCG
125 TABLET ORAL
Status: DISCONTINUED | OUTPATIENT
Start: 2022-12-17 | End: 2022-12-16 | Stop reason: HOSPADM

## 2022-12-15 RX ORDER — FAMOTIDINE 20 MG/1
10 TABLET, FILM COATED ORAL 2 TIMES DAILY
Status: CANCELLED | OUTPATIENT
Start: 2022-12-15

## 2022-12-15 RX ORDER — POTASSIUM CHLORIDE 7.45 MG/ML
10 INJECTION INTRAVENOUS PRN
Status: DISCONTINUED | OUTPATIENT
Start: 2022-12-15 | End: 2022-12-15

## 2022-12-15 RX ORDER — FLECAINIDE ACETATE 100 MG/1
100 TABLET ORAL EVERY 12 HOURS SCHEDULED
Status: CANCELLED | OUTPATIENT
Start: 2022-12-15

## 2022-12-15 RX ORDER — LORAZEPAM 0.5 MG/1
0.5 TABLET ORAL NIGHTLY PRN
Status: DISCONTINUED | OUTPATIENT
Start: 2022-12-15 | End: 2022-12-16 | Stop reason: HOSPADM

## 2022-12-15 RX ORDER — ATORVASTATIN CALCIUM 10 MG/1
10 TABLET, FILM COATED ORAL NIGHTLY
Status: DISCONTINUED | OUTPATIENT
Start: 2022-12-15 | End: 2022-12-16 | Stop reason: HOSPADM

## 2022-12-15 RX ORDER — ONDANSETRON 4 MG/1
4 TABLET, ORALLY DISINTEGRATING ORAL EVERY 8 HOURS PRN
Status: DISCONTINUED | OUTPATIENT
Start: 2022-12-15 | End: 2022-12-16 | Stop reason: HOSPADM

## 2022-12-15 RX ORDER — INSULIN LISPRO 100 [IU]/ML
0-4 INJECTION, SOLUTION INTRAVENOUS; SUBCUTANEOUS
Status: DISCONTINUED | OUTPATIENT
Start: 2022-12-16 | End: 2022-12-16 | Stop reason: HOSPADM

## 2022-12-15 RX ORDER — SODIUM CHLORIDE 0.9 % (FLUSH) 0.9 %
10 SYRINGE (ML) INJECTION PRN
Status: DISCONTINUED | OUTPATIENT
Start: 2022-12-15 | End: 2022-12-16 | Stop reason: HOSPADM

## 2022-12-15 RX ORDER — SODIUM CHLORIDE 9 MG/ML
INJECTION, SOLUTION INTRAVENOUS CONTINUOUS
Status: DISCONTINUED | OUTPATIENT
Start: 2022-12-15 | End: 2022-12-16 | Stop reason: HOSPADM

## 2022-12-15 RX ORDER — 0.9 % SODIUM CHLORIDE 0.9 %
500 INTRAVENOUS SOLUTION INTRAVENOUS ONCE
Status: COMPLETED | OUTPATIENT
Start: 2022-12-15 | End: 2022-12-15

## 2022-12-15 RX ORDER — SODIUM CHLORIDE 0.9 % (FLUSH) 0.9 %
5-40 SYRINGE (ML) INJECTION EVERY 12 HOURS SCHEDULED
Status: DISCONTINUED | OUTPATIENT
Start: 2022-12-15 | End: 2022-12-16 | Stop reason: HOSPADM

## 2022-12-15 RX ORDER — FENOFIBRATE 160 MG/1
160 TABLET ORAL DAILY
Status: DISCONTINUED | OUTPATIENT
Start: 2022-12-16 | End: 2022-12-16 | Stop reason: HOSPADM

## 2022-12-15 RX ORDER — PANTOPRAZOLE SODIUM 20 MG/1
20 TABLET, DELAYED RELEASE ORAL DAILY
Status: DISCONTINUED | OUTPATIENT
Start: 2022-12-16 | End: 2022-12-16 | Stop reason: HOSPADM

## 2022-12-15 RX ORDER — ACETAMINOPHEN 325 MG/1
650 TABLET ORAL EVERY 6 HOURS PRN
Status: DISCONTINUED | OUTPATIENT
Start: 2022-12-15 | End: 2022-12-16 | Stop reason: HOSPADM

## 2022-12-15 RX ORDER — ONDANSETRON 2 MG/ML
4 INJECTION INTRAMUSCULAR; INTRAVENOUS EVERY 6 HOURS PRN
Status: DISCONTINUED | OUTPATIENT
Start: 2022-12-15 | End: 2022-12-16 | Stop reason: HOSPADM

## 2022-12-15 RX ORDER — BRIMONIDINE TARTRATE 2 MG/ML
1 SOLUTION/ DROPS OPHTHALMIC 2 TIMES DAILY
Status: DISCONTINUED | OUTPATIENT
Start: 2022-12-16 | End: 2022-12-16 | Stop reason: HOSPADM

## 2022-12-15 RX ADMIN — ASPIRIN 324 MG: 81 TABLET, CHEWABLE ORAL at 13:01

## 2022-12-15 RX ADMIN — LORAZEPAM 0.5 MG: 0.5 TABLET ORAL at 22:08

## 2022-12-15 RX ADMIN — ATORVASTATIN CALCIUM 10 MG: 10 TABLET, FILM COATED ORAL at 22:08

## 2022-12-15 RX ADMIN — SODIUM CHLORIDE: 9 INJECTION, SOLUTION INTRAVENOUS at 22:05

## 2022-12-15 RX ADMIN — APIXABAN 2.5 MG: 5 TABLET, FILM COATED ORAL at 22:07

## 2022-12-15 RX ADMIN — SODIUM CHLORIDE 500 ML: 9 INJECTION, SOLUTION INTRAVENOUS at 18:49

## 2022-12-15 RX ADMIN — SODIUM CHLORIDE, PRESERVATIVE FREE 10 ML: 5 INJECTION INTRAVENOUS at 22:56

## 2022-12-15 RX ADMIN — FLECAINIDE ACETATE 25 MG: 50 TABLET ORAL at 22:07

## 2022-12-15 RX ADMIN — BUSPIRONE HYDROCHLORIDE 5 MG: 5 TABLET ORAL at 22:08

## 2022-12-15 ASSESSMENT — PAIN - FUNCTIONAL ASSESSMENT
PAIN_FUNCTIONAL_ASSESSMENT: NONE - DENIES PAIN

## 2022-12-15 ASSESSMENT — HEART SCORE: ECG: 1

## 2022-12-15 NOTE — ED NOTES
Madalyn Nguyen at the transfer center is repaging the hospitalist at [address] [place] [address].      Aurora Clay  12/15/22 3542

## 2022-12-15 NOTE — ED PROVIDER NOTES
eMERGENCY dEPARTMENT eNCOUnter      PtName: Anand Bush  MRN: [id number]  [place] 1/7/1930  Date of evaluation: 12/15/2022  Provider: Negro Ernandez DO     CHIEF COMPLAINT       Chief Complaint   Patient presents with    Chest Pain     C/o's her chest \"feeling funny\" since yesterday         HISTORY OF PRESENT ILLNESS   (Location/Symptom, Timing/Onset,Context/Setting, Quality, Duration, Modifying Factors, Severity) Note limiting factors. HPI    Anand Bush is a 80 y.o. female who presents to the emergency department with chief complaint of chest pain that started yesterday. Moderate, intermittent no radiation. Achy. No alleviating or aggravating factors. History of atrial fibrillation on anticoagulants. No shortness of breath nausea or sweats. Last stress test was negative in 2018. Nursing Notes were reviewed. REVIEW OF SYSTEMS    (2+ forlevel 4; 10+ for level 5)     Review of Systems  See hpi for further details. Complete 10 point review of all systems performed and is otherwise negative unless noted above.     PAST MEDICAL HISTORY     Past Medical History:   Diagnosis Date    Arthritis     CAD (coronary artery disease)     Cancer of cheek ([facility] [address] 4/15/2010    COVID-19 11/24/20, 11/10/2020    Diabetes mellitus ([facility] [address]     Hyperlipidemia     Hypertension     Osteoporosis 1/15/2019    Retrocalcaneal bursitis 3/28/2017    Secondary osteoarthritis of left shoulder due to rotator cuff arthropathy 7/16/2019    TB (pulmonary tuberculosis) 2010       SURGICAL HISTORY       Past Surgical History:   Procedure Laterality Date    CARDIAC CATHETERIZATION      x 4 all normal    CHOLECYSTECTOMY, LAPAROSCOPIC N/A 5/1/13    LIVER BIOPSY; EXCISION OF NECROTIC LYMP NODE OF CROQUET; UMBILICAL HERNIA REPAIR    LUNG SURGERY      partial right lower lobectomy    SHOULDER SURGERY      THROAT SURGERY      vocal cord polyp       CURRENT MEDICATIONS       Previous Medications    ACETAMINOPHEN (TYLENOL) 325 MG TABLET Take 650 mg by mouth every 6 hours as needed for Pain    ATORVASTATIN (LIPITOR) 20 MG TABLET    Take 1 tablet by mouth nightly    B COMPLEX VITAMINS (B COMPLEX 100 PO)    Take by mouth    BISACODYL (DULCOLAX) 10 MG SUPPOSITORY    Place 10 mg rectally daily    BRIMONIDINE (ALPHAGAN) 0.2 % OPHTHALMIC SOLUTION    Place 1 drop into both eyes 2 times daily    BUSPIRONE (BUSPAR) 5 MG TABLET    Take 5 mg by mouth 2 times daily    DICLOFENAC SODIUM (VOLTAREN) 1 % GEL    APPLY 4 G TOPICALLY 4 TIMES DAILY    DIGOXIN (LANOXIN) 125 MCG TABLET    Take 125 mcg by mouth four times a week Every Mon, Wed, Fri, Sat. DORZOLAMIDE (TRUSOPT) 2 % OPHTHALMIC SOLUTION    Place 1 drop into both eyes 2 times daily at 0800 and 1400    ELIQUIS 2.5 MG TABS TABLET    2.5 mg 2 times daily     ESOMEPRAZOLE MAGNESIUM (NEXIUM) 20 MG PACK    Take 20 mg by mouth daily    FAMOTIDINE (PEPCID) 10 MG TABLET    Take 1 tablet by mouth 2 times daily    FENOFIBRATE (TRICOR) 145 MG TABLET    200 mg     FLECAINIDE (TAMBOCOR) 50 MG TABLET    Take 50 mg by mouth 2 times daily Take 1/2 tablet twice daily    GLIMEPIRIDE (AMARYL) 4 MG TABLET    Take 4 mg by mouth daily as needed Take before breakfast for blood sugar greater than 200. KLOR-CON 10 10 MEQ TABLET        LATANOPROST (XALATAN) 0.005 % OPHTHALMIC SOLUTION    Place 1 drop into both eyes daily    LORAZEPAM (ATIVAN) 0.5 MG TABLET    Take 0.5 mg by mouth 2 times daily. Madeline Spivey MAG-G 500 (27 MG) MG TABS TABLET    400 mg     MAGNESIUM HYDROXIDE (MILK OF MAGNESIA) 400 MG/5ML SUSPENSION    Take 30 mLs by mouth daily as needed for Constipation    METFORMIN (GLUCOPHAGE) 500 MG TABLET    Take 500 mg by mouth 2 times daily (with meals)    MULTIPLE VITAMINS-MINERALS (THERAPEUTIC MULTIVITAMIN-MINERALS) TABLET    Take 1 tablet by mouth daily    TRUETEST TEST STRIP        VITAMIN B-12 (CYANOCOBALAMIN) 1000 MCG TABLET    Take 1,000 mcg by mouth daily.        ALLERGIES     Other, Penicillins, Polysporin [bacitracin-polymyxin b], and Adhesive tape    FAMILY HISTORY     History reviewed. No pertinent family history. SOCIAL HISTORY       Social History     Socioeconomic History    Marital status:      Spouse name: None    Number of children: None    Years of education: None    Highest education level: None   Occupational History    Occupation: retired    Tobacco Use    Smoking status: Never    Smokeless tobacco: Never   Vaping Use    Vaping Use: Never used   Substance and Sexual Activity    Alcohol use: No     Alcohol/week: 0.0 standard drinks    Drug use: No    Sexual activity: Not Currently     Partners: Male       SCREENINGS      Heart Score for chest pain patients  History: Moderately Suspicious  ECG: Non-Specifc repolarization disturbance/LBTB/PM  Patient Age: > 65 years  *Risk factors for Atherosclerotic disease: Diabetes Mellitus, Hypercholesterolemia, Hypertension  Risk Factors: > 3 Risk factors or history of atherosclerotic disease*  Troponin: < 1X normal limit  Heart Score Total: 6    PHYSICAL EXAM    (5+ for level 4, 8+ for level 5)     ED Triage Vitals   BP Temp Temp src Pulse Resp SpO2 Height Weight   -- -- -- -- -- -- -- --       Physical Exam  Vitals and nursing note reviewed. Constitutional:       General: She is not in acute distress. Appearance: Normal appearance. She is not toxic-appearing or diaphoretic. HENT:      Head: Normocephalic and atraumatic. Right Ear: External ear normal.      Left Ear: External ear normal.      Nose: Nose normal.      Mouth/Throat:      Mouth: Mucous membranes are moist.      Pharynx: Oropharynx is clear. Eyes:      General: No scleral icterus. Right eye: No discharge. Left eye: No discharge. Extraocular Movements: Extraocular movements intact. Conjunctiva/sclera: Conjunctivae normal.      Pupils: Pupils are equal, round, and reactive to light. Cardiovascular:      Rate and Rhythm: Normal rate. Rhythm irregular. Pulses: Normal pulses. Heart sounds: No murmur heard. No friction rub. No gallop. Pulmonary:      Effort: Pulmonary effort is normal. No respiratory distress. Breath sounds: Normal breath sounds. No stridor. Abdominal:      General: Abdomen is flat. There is no distension. Tenderness: There is no abdominal tenderness. Musculoskeletal:         General: No swelling, tenderness, deformity or signs of injury. Normal range of motion. Cervical back: Normal range of motion and neck supple. Right lower leg: No edema. Left lower leg: No edema. Skin:     General: Skin is warm and dry. Capillary Refill: Capillary refill takes less than 2 seconds. Coloration: Skin is not jaundiced or pale. Findings: No bruising, erythema, lesion or rash. Neurological:      General: No focal deficit present. Mental Status: She is alert and oriented to person, place, and time. Cranial Nerves: No cranial nerve deficit. Sensory: No sensory deficit. Motor: No weakness. Psychiatric:         Mood and Affect: Mood normal.         Behavior: Behavior normal.         Thought Content: Thought content normal.         Judgment: Judgment normal.       DIAGNOSTIC RESULTS     EKG (Per Emergency Physician):   EKG interpretation by ED physician: Normal axis, A. fib at 99 bpm.  LBBB present. Nonspecific ST changes laterally. Similar to previous EKG from 3/3/2022    RADIOLOGY (Per Emergency Physician): Interpretation per the Radiologist below, if available at the time of this note:  XR CHEST PORTABLE    Result Date: 12/15/2022  EXAMINATION: ONE XRAY VIEW OF THE CHEST 12/15/2022 12:22 pm COMPARISON: 03/03/2022 HISTORY: ORDERING SYSTEM PROVIDED HISTORY: CP TECHNOLOGIST PROVIDED HISTORY: Reason for exam:->CP Reason for Exam: chest jpain FINDINGS: There is right basilar and biapical scarring status post right upper and right lower lobe wedge resections.   No change in the left pulmonary nodules. The cardiac silhouette is within normal limits. There is no pneumothorax or pleural effusion. 1. No significant change. LABS:  Labs Reviewed   COMPREHENSIVE METABOLIC PANEL W/ REFLEX TO MG FOR LOW K - Abnormal; Notable for the following components:       Result Value    CO2 19 (*)     Glucose 359 (*)     BUN 30 (*)     Creatinine 1.3 (*)     Est, Glom Filt Rate 38 (*)     Alkaline Phosphatase 34 (*)     All other components within normal limits   CBC WITH AUTO DIFFERENTIAL   TROPONIN   TROPONIN       All other labs were within normal range or not returned as of this dictation. EMERGENCY DEPARTMENT COURSE and DIFFERENTIAL DIAGNOSIS/MDM:   Vitals:    Vitals:    12/15/22 1200 12/15/22 1300   BP: (!) 164/73 129/67   Pulse: 97 88   Resp: 18 18   Temp: 97.7 °F (36.5 °C)    TempSrc: Oral    SpO2: 99% 99%   Weight: 124 lb (56.2 kg)    Height: 5' 4\" (1.626 m)        Medications   aspirin chewable tablet 324 mg (324 mg Oral Given 12/15/22 1301)       MDM  . Cardiac work-up initiated. Aspirin ordered. History: 1  EC  Patient Age: 2  *Risk factors for Atherosclerotic disease: Diabetes Mellitus; Hypercholesterolemia; Hypertension  Risk Factors: 2  Troponin: 0  Heart Score Total: 6      Rec admission to [facility]; pain improved on repeat eval. Delta trop ordered and pending at the time of this note. CONSULTS:  None    PROCEDURES:  Unless otherwise noted below, none     Procedures    FINAL IMPRESSION      1. Acute chest pain          DISPOSITION/PLAN   DISPOSITION Decision To Admit 12/15/2022 01:30:54 PM      PATIENT REFERRED TO:  No follow-up provider specified. DISCHARGE MEDICATIONS:  New Prescriptions    No medications on file          (Please note:  Portions of this note were completed with a voice recognition program. Efforts were made to edit the dictations but occasionally words and phrases are mis-transcribed.)    Form v2016. J.[id number]    Galina Muller, DO (electronically signed)  Emergency Medicine Provider              Kyle Bowers DO  12/15/22 [id number]

## 2022-12-15 NOTE — PLAN OF CARE
81 y/o with CP and SOB     Atrial fibrillation, not new     Trops negative     NPO after midnight   IVF   Resumed home meds  Cardiology consulted  Echo ordered, will defer stress to provider examining patient or cards    PCU     RATNA Alexis  12/15/22  3:26 PM

## 2022-12-16 VITALS
SYSTOLIC BLOOD PRESSURE: 110 MMHG | OXYGEN SATURATION: 98 % | WEIGHT: 114.9 LBS | HEIGHT: 64 IN | TEMPERATURE: 98.2 F | HEART RATE: 63 BPM | RESPIRATION RATE: 18 BRPM | DIASTOLIC BLOOD PRESSURE: 72 MMHG | BODY MASS INDEX: 19.62 KG/M2

## 2022-12-16 PROBLEM — R07.9 CHEST PAIN: Status: ACTIVE | Noted: 2022-12-16

## 2022-12-16 PROBLEM — G89.29 CHRONIC LEFT SHOULDER PAIN: Status: ACTIVE | Noted: 2022-12-16

## 2022-12-16 PROBLEM — I48.21 PERMANENT ATRIAL FIBRILLATION (HCC): Status: ACTIVE | Noted: 2022-12-16

## 2022-12-16 PROBLEM — I48.20 CHRONIC A-FIB (HCC): Status: ACTIVE | Noted: 2022-12-16

## 2022-12-16 PROBLEM — M25.512 CHRONIC LEFT SHOULDER PAIN: Status: ACTIVE | Noted: 2022-12-16

## 2022-12-16 LAB
ANION GAP SERPL CALCULATED.3IONS-SCNC: 11 MMOL/L (ref 3–16)
BUN BLDV-MCNC: 26 MG/DL (ref 7–20)
CALCIUM SERPL-MCNC: 9.8 MG/DL (ref 8.3–10.6)
CHLORIDE BLD-SCNC: 108 MMOL/L (ref 99–110)
CHOLESTEROL, TOTAL: 96 MG/DL (ref 0–199)
CO2: 21 MMOL/L (ref 21–32)
CREAT SERPL-MCNC: 1 MG/DL (ref 0.6–1.2)
EKG ATRIAL RATE: 66 BPM
EKG DIAGNOSIS: NORMAL
EKG Q-T INTERVAL: 434 MS
EKG QRS DURATION: 138 MS
EKG QTC CALCULATION (BAZETT): 488 MS
EKG R AXIS: -45 DEGREES
EKG T AXIS: 133 DEGREES
EKG VENTRICULAR RATE: 76 BPM
ESTIMATED AVERAGE GLUCOSE: 220.2 MG/DL
GFR SERPL CREATININE-BSD FRML MDRD: 53 ML/MIN/{1.73_M2}
GLUCOSE BLD-MCNC: 122 MG/DL (ref 70–99)
GLUCOSE BLD-MCNC: 132 MG/DL (ref 70–99)
GLUCOSE BLD-MCNC: 175 MG/DL (ref 70–99)
HBA1C MFR BLD: 9.3 %
HCT VFR BLD CALC: 39.8 % (ref 36–48)
HDLC SERPL-MCNC: 33 MG/DL (ref 40–60)
HEMOGLOBIN: 13.1 G/DL (ref 12–16)
LDL CHOLESTEROL CALCULATED: 31 MG/DL
MCH RBC QN AUTO: 32.4 PG (ref 26–34)
MCHC RBC AUTO-ENTMCNC: 32.8 G/DL (ref 31–36)
MCV RBC AUTO: 98.6 FL (ref 80–100)
PDW BLD-RTO: 12.5 % (ref 12.4–15.4)
PERFORMED ON: ABNORMAL
PERFORMED ON: ABNORMAL
PLATELET # BLD: 136 K/UL (ref 135–450)
PMV BLD AUTO: 10.7 FL (ref 5–10.5)
POTASSIUM REFLEX MAGNESIUM: 4.2 MMOL/L (ref 3.5–5.1)
RBC # BLD: 4.03 M/UL (ref 4–5.2)
SODIUM BLD-SCNC: 140 MMOL/L (ref 136–145)
TRIGL SERPL-MCNC: 161 MG/DL (ref 0–150)
TROPONIN: <0.01 NG/ML
VLDLC SERPL CALC-MCNC: 32 MG/DL
WBC # BLD: 5.9 K/UL (ref 4–11)

## 2022-12-16 PROCEDURE — 99217 PR OBSERVATION CARE DISCHARGE MANAGEMENT: CPT | Performed by: INTERNAL MEDICINE

## 2022-12-16 PROCEDURE — 80061 LIPID PANEL: CPT

## 2022-12-16 PROCEDURE — 96361 HYDRATE IV INFUSION ADD-ON: CPT

## 2022-12-16 PROCEDURE — 93005 ELECTROCARDIOGRAM TRACING: CPT

## 2022-12-16 PROCEDURE — 36415 COLL VENOUS BLD VENIPUNCTURE: CPT

## 2022-12-16 PROCEDURE — G0378 HOSPITAL OBSERVATION PER HR: HCPCS

## 2022-12-16 PROCEDURE — 6370000000 HC RX 637 (ALT 250 FOR IP)

## 2022-12-16 PROCEDURE — 84484 ASSAY OF TROPONIN QUANT: CPT

## 2022-12-16 PROCEDURE — 6370000000 HC RX 637 (ALT 250 FOR IP): Performed by: INTERNAL MEDICINE

## 2022-12-16 PROCEDURE — 80048 BASIC METABOLIC PNL TOTAL CA: CPT

## 2022-12-16 PROCEDURE — 99223 1ST HOSP IP/OBS HIGH 75: CPT | Performed by: INTERNAL MEDICINE

## 2022-12-16 PROCEDURE — 93010 ELECTROCARDIOGRAM REPORT: CPT | Performed by: INTERNAL MEDICINE

## 2022-12-16 PROCEDURE — 93306 TTE W/DOPPLER COMPLETE: CPT

## 2022-12-16 PROCEDURE — 85027 COMPLETE CBC AUTOMATED: CPT

## 2022-12-16 RX ORDER — METOPROLOL SUCCINATE 25 MG/1
25 TABLET, EXTENDED RELEASE ORAL DAILY
Status: DISCONTINUED | OUTPATIENT
Start: 2022-12-17 | End: 2022-12-16

## 2022-12-16 RX ORDER — LORAZEPAM 0.5 MG/1
0.5 TABLET ORAL NIGHTLY PRN
Qty: 3 TABLET | Refills: 0 | Status: SHIPPED | OUTPATIENT
Start: 2022-12-16 | End: 2023-01-15

## 2022-12-16 RX ORDER — METOPROLOL SUCCINATE 25 MG/1
25 TABLET, EXTENDED RELEASE ORAL DAILY
Qty: 30 TABLET | Refills: 3
Start: 2022-12-16

## 2022-12-16 RX ORDER — METOPROLOL SUCCINATE 25 MG/1
25 TABLET, EXTENDED RELEASE ORAL DAILY
Status: DISCONTINUED | OUTPATIENT
Start: 2022-12-16 | End: 2022-12-16 | Stop reason: HOSPADM

## 2022-12-16 RX ADMIN — FENOFIBRATE 160 MG: 160 TABLET ORAL at 10:36

## 2022-12-16 RX ADMIN — METOPROLOL SUCCINATE 25 MG: 25 TABLET, EXTENDED RELEASE ORAL at 11:24

## 2022-12-16 RX ADMIN — BUSPIRONE HYDROCHLORIDE 5 MG: 5 TABLET ORAL at 10:36

## 2022-12-16 RX ADMIN — LATANOPROST 1 DROP: 50 SOLUTION OPHTHALMIC at 09:08

## 2022-12-16 RX ADMIN — DORZOLAMIDE HYDROCHLORIDE 1 DROP: 20 SOLUTION/ DROPS OPHTHALMIC at 09:09

## 2022-12-16 RX ADMIN — BRIMONIDINE TARTRATE 1 DROP: 2 SOLUTION OPHTHALMIC at 09:08

## 2022-12-16 RX ADMIN — DORZOLAMIDE HYDROCHLORIDE 1 DROP: 20 SOLUTION/ DROPS OPHTHALMIC at 13:23

## 2022-12-16 RX ADMIN — PANTOPRAZOLE SODIUM 20 MG: 20 TABLET, DELAYED RELEASE ORAL at 10:36

## 2022-12-16 RX ADMIN — APIXABAN 2.5 MG: 5 TABLET, FILM COATED ORAL at 10:36

## 2022-12-16 RX ADMIN — ASPIRIN 81 MG: 81 TABLET, CHEWABLE ORAL at 10:36

## 2022-12-16 NOTE — FLOWSHEET NOTE
12/16/22 1321   Vital Signs   Temp 98.2 °F (36.8 °C)   Temp Source Oral   Heart Rate 63   Heart Rate Source Monitor   Resp 18   /72   MAP (Calculated) 85   BP Location Left upper arm   BP Method Automatic   Patient Position Semi fowlers   Level of Consciousness 0   MEWS Score 1   Oxygen Therapy   SpO2 98 %   O2 Device None (Room air)   Patient resting. Eye drops given at this time.

## 2022-12-16 NOTE — PROGRESS NOTES
Patient admitted to room 306 from [facility]. Patient oriented to room, call light, bed rails, phone, lights and bathroom. Patient instructed about the schedule of the day including: vital sign frequency, lab draws, possible tests, frequency of MD and staff rounds, daily weights, I &O's and prescribed diet. Telemetry box in place, patient aware of placement and reason. Bed locked, in lowest position, side rails up 2/4, call light within reach. Recliner Assessment  Patient is able to demonstrate the ability to move from a reclining position to an upright position within the recliner. 4 Eyes Skin Assessment     The patient is being assess for   Admission    I agree that 2 RN's have performed a thorough Head to Toe Skin Assessment on the patient. ALL assessment sites listed below have been assessed. Areas assessed for pressure by both nurses:   [x]   Head, Face, and Ears   [x]   Shoulders, Back, and Chest, Abdomen  [x]   Arms, Elbows, and Hands   [x]   Coccyx, Sacrum, and Ischium  [x]   Legs, Feet, and Heels    No skin issues, skin intact. Skin Assessed Under all Medical Devices by both nurses:  NA               All Mepilex Borders were peeled back and area peeked at by both nurses:  No: NA  Please list where Mepilex Borders are located:  NA             **SHARE this note so that the co-signing nurse is able to place an eSignature**    Co-signer eSignature: Electronically signed by Parth Gaspar RN on 12/15/22 at 11:18 PM EST    Does the Patient have Skin Breakdown related to pressure?   No            Jatin Prevention initiated:  NA   Wound Care Orders initiated:  NA      [facility] nurse consulted for Pressure Injury (Stage 3,4, Unstageable, DTI, NWPT, Complex wounds)and New or Established Ostomies:  NA      Primary Nurse eSignature: Electronically signed by Fay Forbes RN on 12/15/22 at 8:54 PM EST

## 2022-12-16 NOTE — FLOWSHEET NOTE
12/16/22 0802   Vital Signs   Temp 97.9 °F (36.6 °C)   Temp Source Oral   Heart Rate 54   Heart Rate Source Monitor   Resp 17   /83   MAP (Calculated) 100   BP Method Automatic   Level of Consciousness 0   MEWS Score 1   Oxygen Therapy   SpO2 96 %   O2 Device None (Room air)   Patient is resting showing no s/s of distress. Patient is alert and oriented. Meds were HELD for cards consult, see MAR. Patient is denying any needs. Bed is in lowest position and call light is within reach. Will continue to monitor. Shift assessment complete, see flowsheets.

## 2022-12-16 NOTE — ED NOTES
[facility] here to transport pt. Pt remains alert and without c/o's.  Report called to PCU RN in SBAR format     Tana Moise, AKMRAN  12/15/22 1949

## 2022-12-16 NOTE — H&P
Hospital Medicine History & Physical      PCP: Daiana Angelita Huey Chidi    Date of Admission: 12/15/2022    Date of Service: Pt seen/examined on 12/15/2022    Chief Complaint: Chest pain    History Of Present Illness:    80 y.o. female who presented to the hospital with a chief complaint of chest pain. The patient presents with intermittent sternal chest pain that started yesterday. The patient rates the pain as a 5 out of 10 and describes it as achy. The pain is nonradiating and intermittent. Patient has a history of atrial fibrillation on blood thinners. She denies any shortness of breath, fevers or chills. She denies any sick contacts. She will be admitted for observation. Past Medical History:        Diagnosis Date    Arthritis     CAD (coronary artery disease)     Cancer of cheek ([facility] [address] 4/15/2010    COVID-19 11/24/20, 11/10/2020    Diabetes mellitus ([facility] [address]     Hyperlipidemia     Hypertension     Osteoporosis 1/15/2019    Retrocalcaneal bursitis 3/28/2017    Secondary osteoarthritis of left shoulder due to rotator cuff arthropathy 7/16/2019    TB (pulmonary tuberculosis) 2010       Past Surgical History:          Procedure Laterality Date    CARDIAC CATHETERIZATION      x 4 all normal    CHOLECYSTECTOMY, LAPAROSCOPIC N/A 5/1/13    LIVER BIOPSY; EXCISION OF NECROTIC LYMP NODE OF CROQUET; UMBILICAL HERNIA REPAIR    LUNG SURGERY      partial right lower lobectomy    SHOULDER SURGERY      THROAT SURGERY      vocal cord polyp       Medications Prior to Admission:      Prior to Admission medications    Medication Sig Start Date End Date Taking?  Authorizing Provider   cetirizine (ZYRTEC) 10 MG chewable tablet Take 10 mg by mouth daily   Yes Historical Provider, MD   insulin aspart (NOVOLOG) 100 UNIT/ML injection vial Inject into the skin 3 times daily Ordered as SSI   Yes Historical Provider, MD   acetaminophen (TYLENOL) 325 MG tablet Take 650 mg by mouth every 6 hours as needed for Pain    Historical Provider, MD   brimonidine (ALPHAGAN) 0.2 % ophthalmic solution Place 1 drop into both eyes 2 times daily    Historical Provider, MD   busPIRone (BUSPAR) 5 MG tablet Take 5 mg by mouth 2 times daily    Historical Provider, MD   digoxin (LANOXIN) 125 MCG tablet Take 125 mcg by mouth four times a week Every Mon, Wed, Fri, Sat. Historical Provider, MD   dorzolamide (TRUSOPT) 2 % ophthalmic solution Place 1 drop into both eyes 2 times daily at 0800 and 1400    Historical Provider, MD   bisacodyl (DULCOLAX) 10 MG suppository Place 10 mg rectally daily    Historical Provider, MD   latanoprost (XALATAN) 0.005 % ophthalmic solution Place 1 drop into both eyes daily    Historical Provider, MD   magnesium hydroxide (MILK OF MAGNESIA) 400 MG/5ML suspension Take 30 mLs by mouth daily as needed for Constipation    Historical Provider, MD   diclofenac sodium (VOLTAREN) 1 % GEL APPLY 4 G TOPICALLY 4 TIMES DAILY 12/29/20   Eva Brar DO   flecainide (TAMBOCOR) 50 MG tablet Take 50 mg by mouth 2 times daily Take 1/2 tablet twice daily    Historical Provider, MD   metFORMIN (GLUCOPHAGE) 500 MG tablet Take 500 mg by mouth 2 times daily (with meals)    Historical Provider, MD   famotidine (PEPCID) 10 MG tablet Take 1 tablet by mouth 2 times daily 11/17/20 12/15/22  RATNA Perez   ELIQUIS 2.5 MG TABS tablet 2.5 mg 2 times daily  10/21/19   Historical Provider, MD   fenofibrate (TRICOR) 145 MG tablet 200 mg     Historical Provider, MD   atorvastatin (LIPITOR) 20 MG tablet Take 1 tablet by mouth nightly  Patient taking differently: Take 10 mg by mouth nightly 7/5/18   Gabby Abarca MD   LORazepam (ATIVAN) 0.5 MG tablet Take 0.5 mg by mouth 2 times daily. Nina Portillo Historical Provider, MD   glimepiride (AMARYL) 4 MG tablet Take 4 mg by mouth daily as needed Take before breakfast for blood sugar greater than 200.     Historical Provider, MD   esomeprazole Magnesium (NEXIUM) 20 MG PACK Take 20 mg by mouth daily Historical Provider, MD   Multiple Vitamins-Minerals (THERAPEUTIC MULTIVITAMIN-MINERALS) tablet Take 1 tablet by mouth daily    Historical Provider, MD   B Complex Vitamins (B COMPLEX 100 PO) Take by mouth    Historical Provider, MD   TRUETEST TEST strip  7/29/15   Historical Provider, MD   MAG-G 500 (27 MG) MG TABS tablet 400 mg  8/4/15   Historical Provider, MD   KLOR-CON 10 10 MEQ tablet  7/14/15   Historical Provider, MD   vitamin B-12 (CYANOCOBALAMIN) 1000 MCG tablet Take 1,000 mcg by mouth daily. Historical Provider, MD       Allergies: Other, Penicillins, Polysporin [bacitracin-polymyxin b], and Adhesive tape    Social History:    TOBACCO:   reports that she has never smoked. She has never used smokeless tobacco.  ETOH:   reports no history of alcohol use. Family History:    History reviewed. No pertinent family history. REVIEW OF SYSTEMS:   Constitutional:  Negative for fever,chills or night sweats  ENT:  Negative for rhinorrhea, epistaxis, hoarseness, sore throat. Respiratory: Negative for SOB or wheezing   Cardiovascular: Positive for chest pain  Gastrointestinal:  Negative for nausea, vomiting, diarrhea  Genitourinary:  Negative for polyuria, dysuria   Hematologic/Lymphatic:  Negative for  bleeding tendency, easy bruising  Musculoskeletal:  Negative for myalgias and arthralgias  Neurologic:  Negative for  confusion,dysarthria. Skin:  Negative for itching,rash  Psychiatric:  Negative for depression,anxiety, agitation. Endocrine:  Negative for polydipsia,polyuria,heat /cold intolerance. PHYSICAL EXAM:  BP (!) 158/86   Pulse 76   Temp 98 °F (36.7 °C) (Oral)   Resp 18   Ht 5' 4\" (1.626 m)   Wt 117 lb 14.4 oz (53.5 kg)   SpO2 99%   BMI 20.24 kg/m²   General appearance:  No apparent distress, appears stated age and cooperative. HEENT:  Normal cephalic, atraumatic without obvious deformity. Pupils equal, round, and reactive to light. Extra ocular muscles intact.  Conjunctivae/corneas clear.  Neck: Supple, with full range of motion. No jugular venous distention. Trachea midline. Respiratory:  Normal respiratory effort. Clear to auscultation, bilaterally without Rales/Wheezes/Rhonchi. Cardiovascular: Rate controlled, regular  Abdomen: Soft, non-tender, non-distended with normal bowel sounds. Musculoskeletal:  No clubbing, cyanosis or edema bilaterally. Full range of motion without deformity. Skin: Skin color, texture, turgor normal.  No rashes or lesions. Neurologic:  Neurovascularly intact without any focal sensory/motor deficits. Cranial nerves: II-XII intact, grossly non-focal.  Psychiatric:  Alert and oriented, thought content appropriate, normal insight  Capillary Refill: Brisk,< 3 seconds   Peripheral Pulses: +2 palpable, equal bilaterally       Labs:   Recent Labs     12/15/22  1215   WBC 6.7   HGB 13.6   HCT 40.3        Recent Labs     12/15/22  1215      K 4.7      CO2 19*   BUN 30*   CREATININE 1.3*   CALCIUM 10.3     Recent Labs     12/15/22  1215   AST 22   ALT 12   BILITOT 0.3   ALKPHOS 34*     No results for input(s): INR in the last 72 hours. Recent Labs     12/15/22  1215 12/15/22  1554 12/15/22  2106   TROPONINI <0.01 <0.01 <0.01       Urinalysis:  Lab Results   Component Value Date/Time    NITRU Negative 11/24/2020 01:54 PM    WBCUA >100 11/24/2020 01:54 PM    BACTERIA 3+ 11/24/2020 01:54 PM    RBCUA 11-20 11/24/2020 01:54 PM    BLOODU MODERATE 11/24/2020 01:54 PM    SPECGRAV >=1.030 11/24/2020 01:54 PM    GLUCOSEU 100 11/24/2020 01:54 PM       Radiology:  EKG:  I have reviewed the EKG with the following interpretation: Irregular rate and rhythm, A. fib    XR CHEST PORTABLE   Final Result   1. No significant change. ASSESSMENT:  Atypical chest pain rule out ACS  Atrial fibrillation on Eliquis  Diabetes mellitus type 2  Hypertension.   Hyperlipidemia  Osteoarthritis  JASBIR with elevated creatinine    PLAN:  Admit to [facility] on telemetry  Cardiology consultation, resume flecainide and digoxin  Rule out ACS with serial troponins  At her advanced age care will likely be very conservative  Sliding-scale insulin  Resume rest of home medication      DVT Prophylaxis: Eliquis  Diet: Diet NPO Exceptions are: Ice Chips, Sips of Water with Meds  Code Status: Full Code    [facility]      Thank you for the opportunity to be involved in this patient's care.       (Please note that portions of this note were completed with a voice recognition program. Efforts were made to edit the dictations but occasionally words are mis-transcribed.)

## 2022-12-16 NOTE — PROGRESS NOTES
IM Progress Note    Admit Date:  12/15/2022  1    Interval history:  chest pain , Afibb     Subjective:  Ms. Dave Foster seen up in bed, reports she lives in a ECF and is independent of ADL and IADl. Has chronic left shoulder issues and has pain into chest from this    No chest pain today   HR remains controlled     Objective:   BP (!) 138/94   Pulse 83   Temp 97.5 °F (36.4 °C) (Oral)   Resp 16   Ht 5' 4\" (1.626 m)   Wt 114 lb 14.4 oz (52.1 kg)   SpO2 95%   BMI 19.72 kg/m²     Intake/Output Summary (Last 24 hours) at 12/16/2022 0724  Last data filed at 12/16/2022 0045  Gross per 24 hour   Intake 120 ml   Output --   Net 120 ml       Physical Exam:        General:  elderly male healthy appearing. Energetic   Awake, alert and oriented. Appears to be not in any distress  Mucous Membranes:  Pink , anicteric  Neck: No JVD, no carotid bruit, no thyromegaly  Chest:  Clear to auscultation bilaterally, no added sounds  Reproducible left chest pain in pectoral region   Cardiovascular:  RRR S1S2 heard, no murmurs or gallops  Abdomen:  Soft, undistended, non tender, no organomegaly, BS present  Extremities: left shoulder limited movements   No edema or cyanosis.  Distal pulses well felt  Neurological : grossly normal        Medications:   Scheduled Medications:    brimonidine  1 drop Both Eyes BID    busPIRone  5 mg Oral BID    [START ON 12/17/2022] digoxin  125 mcg Oral Once per day on Sun Tue Thu Sat    dorzolamide  1 drop Both Eyes BID    apixaban  2.5 mg Oral BID    pantoprazole  20 mg Oral Daily    latanoprost  1 drop Both Eyes Daily    insulin lispro  0-4 Units SubCUTAneous TID WC    insulin lispro  0-4 Units SubCUTAneous Nightly    sodium chloride flush  5-40 mL IntraVENous 2 times per day    aspirin  81 mg Oral Daily    atorvastatin  10 mg Oral Nightly    fenofibrate  160 mg Oral Daily    flecainide  25 mg Oral BID     I   dextrose      sodium chloride      sodium chloride 75 mL/hr at 12/15/22 2205     LORazepam, glucose, dextrose bolus **OR** dextrose bolus, glucagon (rDNA), dextrose, sodium chloride flush, sodium chloride, ondansetron **OR** ondansetron, acetaminophen **OR** acetaminophen, polyethylene glycol, perflutren lipid microspheres    Lab Data:  Recent Labs     12/15/22  1215 12/16/22  0437   WBC 6.7 5.9   HGB 13.6 13.1   HCT 40.3 39.8   MCV 97.3 98.6    136     Recent Labs     12/15/22  1215 12/16/22  0437    140   K 4.7 4.2    108   CO2 19* 21   BUN 30* 26*   CREATININE 1.3* 1.0     Recent Labs     12/15/22  2106 12/16/22  0019   TROPONINI <0.01 <0.01       Coagulation:   Lab Results   Component Value Date/Time    INR 1.55 11/06/2020 03:15 PM     Cardiac markers:   Lab Results   Component Value Date/Time    CKTOTAL 41 11/09/2020 06:10 PM    TROPONINI <0.01 12/16/2022 12:19 AM         Lab Results   Component Value Date    ALT 12 12/15/2022    AST 22 12/15/2022    ALKPHOS 34 (L) 12/15/2022    BILITOT 0.3 12/15/2022       Lab Results   Component Value Date    INR 1.55 (H) 11/06/2020    INR 1.23 (H) 05/12/2019    PROTIME 17.7 (H) 11/06/2020    PROTIME 13.9 (H) 05/12/2019         Radiology:  EKG:  I have reviewed the EKG with the following interpretation: Irregular rate and rhythm, A. Fib LBBB     XR CHEST PORTABLE   Final Result   1. No significant change.                  Assessment & Plan:    Atypical chest pain   - likely muscular in etiology   - ACS ruled out with neg EKG and neg serial troponins   At her advanced age care will likely be very conservative  Resume home ASA, statins       Atrial fibrillation -Cardiology consulted  HR improved , resume flecainide and digoxin    Diabetes mellitus type 2- resume home meds and monitor with ssi       Hyperlipidemia- resume home statins    Osteoarthritis- left shoulder  pain is chronic   supportive care for now    JASBIR with elevated creatinine- likely with dehydration , resolved with IVF           DVT Prophylaxis: Eliquis  Diet: diabetic diet  Code Status: Full Code   dc planning         Nicho Paredes MD, 12/16/2022 7:24 AM

## 2022-12-16 NOTE — DISCHARGE INSTR - COC
Continuity of Care Form    Patient Name: Torrey Landeros   :  1930  MRN:  [id number]    [facility] date:  12/15/2022  Discharge date:      Code Status Order: Full Code   Advance Directives:     Admitting Physician:  Ej Rodriguez MD  PCP: Daiana Jefferson    Discharging Nurse: [facility]/Room#: /1681-46  Discharging Unit Phone Number: [phone]    Emergency Contact:   Extended Emergency Contact Information  Primary Emergency Contact: [address] [facility] Phone: [phone]  Mobile Phone: [phone]  Relation: Child  Secondary Emergency Contact: Jeremy Manuel  Address: [address], [address] [address] [facility]  Home Phone: [phone]  Mobile Phone: [phone]  Relation: Child    Past Surgical History:  Past Surgical History:   Procedure Laterality Date    CARDIAC CATHETERIZATION      x 4 all normal    CHOLECYSTECTOMY, LAPAROSCOPIC N/A 13    LIVER BIOPSY; EXCISION OF NECROTIC LYMP NODE OF CROQUET; UMBILICAL HERNIA REPAIR    LUNG SURGERY      partial right lower lobectomy    SHOULDER SURGERY      THROAT SURGERY      vocal cord polyp       Immunization History:   Immunization History   Administered Date(s) Administered    COVID-19, PFIZER PURPLE top, DILUTE for use, (age 15 y+), 30mcg/0.3mL 2021, 2021, 2021       Active Problems:  Patient Active Problem List   Diagnosis Code    Contusion of shoulder region S40.019A    Incomplete tear of left rotator cuff M75.112    Hip strain, right, initial encounter S76.011A    Contusion of hip S70.00XA    Closed fracture of head of radius S52.123A    Contusion of elbow S50.00XA    Carpal tunnel syndrome G56.00    Enthesopathy of hip M76.899    Adhesive capsulitis of shoulder M75.00    Chest discomfort R07.89    HTN (hypertension) I10    HLD (hyperlipidemia) E78.5    GERD (gastroesophageal reflux disease) K21.9    Anxiety F41.9    Syncope and collapse R55    Angina pectoris ([address] I20.9 Retrocalcaneal bursitis M77.50    Tendonitis, Achilles, right M76.61    Right Achilles tendinitis M76.61    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    Acute chest pain R07.9    Trochanteric bursitis of left hip M70.62    Osteoporosis M81.0    Sprain of left hip S73.102A    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    Major neurocognitive disorder ([facility]) F03.90    Diabetes mellitus ([facility]) E11.9    Transaminitis R74.01    CAD (coronary artery disease) I25.10    Visual hallucinations R44.1    Traumatic incomplete tear of left rotator cuff S46.012A    Community acquired pneumonia of right lower lobe of lung J18.9    Pneumonia due to COVID-19 virus U07.1, J12.82    COVID-19 U07.1    Elevated LFTs R79.89    Failure to thrive in adult R62.7    Chronic left shoulder pain M25.512, G89.29    Chronic a-fib ([facility]) I48.20       Isolation/Infection:   Isolation            No Isolation          Patient Infection Status       Infection Onset Added Last Indicated Last Indicated By Review Planned Expiration Resolved Resolved By    None active    Resolved    COVID-19 (Rule Out) 12/15/22 12/15/22 12/15/22 COVID-19 & Influenza Combo (Ordered)   12/15/22 Rule-Out Test Resulted    COVID-19 11/10/20 11/10/20 11/24/20 COVID-19   20     COVID-19 (Rule Out) 11/09/20 11/09/20 11/10/20 COVID-19 (Ordered)   11/10/20 Rule-Out Test Resulted            Nurse Assessment:  Last Vital Signs: /83   Pulse 54   Temp 97.9 °F (36.6 °C) (Oral)   Resp 17   Ht 5' 4\" (1.626 m)   Wt 114 lb 14.4 oz (52.1 kg)   SpO2 96%   BMI 19.72 kg/m²     Last documented pain score (0-10 scale):    Last Weight:   Wt Readings from Last 1 Encounters:   22 114 lb 14.4 oz (52.1 kg)     Mental Status:  oriented and alert    IV Access:      Nursing Mobility/ADLs:  Walking   Assisted  Transfer  Assisted  Bathing  Assisted  Dressing  Assisted  Toileting  Assisted  Feeding  Assisted  Med Admin  Assisted  Med Delivery whole    Wound Care Documentation and Therapy:        Elimination:  Continence: Bowel: Yes  Bladder: Yes  Urinary Catheter: None   Colostomy/Ileostomy/Ileal Conduit: No       Date of Last BM: 12/15    Intake/Output Summary (Last 24 hours) at 12/16/2022 1043  Last data filed at 12/16/2022 0045  Gross per 24 hour   Intake 120 ml   Output --   Net 120 ml     I/O last 3 completed shifts: In: 120 [P.O.:120]  Out: -     Safety Concerns: At Risk for Falls    Impairments/Disabilities:      None    Nutrition Therapy:  Current Nutrition Therapy:   - Oral Diet:  Carb Control 4 carbs/meal (1800kcals/day)    Routes of Feeding: Oral  Liquids: No Restrictions  Daily Fluid Restriction: no  Last Modified Barium Swallow with Video (Video Swallowing Test): not done    Treatments at the Time of Hospital Discharge:   Respiratory Treatments: none  Oxygen Therapy:  is not on home oxygen therapy. Ventilator:    - No ventilator support    Rehab Therapies: Physical Therapy and Occupational Therapy  Weight Bearing Status/Restrictions: No weight bearing restrictions  Other Medical Equipment (for information only, NOT a DME order):  walker  Other Treatments: none    Patient's personal belongings (please select all that are sent with patient):   All belongings are packed and ready    RN SIGNATURE:  Electronically signed by Landy Zhou RN on 12/16/22 at 12:04 PM EST    CASE MANAGEMENT/SOCIAL WORK SECTION    Inpatient Status Date: 12/15/2022    Readmission Risk Assessment Score:  Readmission Risk              Risk of Unplanned Readmission:  19         Discharging to Facility/ Agency   Name: [facility]   Address:  Phone: [phone]  Fax:    / signature: Electronically signed by CORIN Valerio, KASH-S on 12/16/22 at 11:22 AM EST    PHYSICIAN SECTION    Prognosis: Good    Condition at Discharge: Stable    Rehab Potential (if transferring to Rehab): Good    Recommended Labs or Other Treatments After Discharge:  stop flecainide and start on toprol 25 mg daily. Monitor Heart rate  F/w primary cardiology in 2 weeks      Physician Certification: I certify the above information and transfer of Carmenza Sanabria  is necessary for the continuing treatment of the diagnosis listed and that she requires Intermediate Nursing Care for greater 30 days.      Update Admission H&P: No change in H&P    PHYSICIAN SIGNATURE:  Electronically signed by Anais Kaplan MD on 12/16/22 at 10:43 AM EST

## 2022-12-16 NOTE — DISCHARGE SUMMARY
Name:  Favio Ross  Room:  /6731-17  MRN:    [id number]    Discharge Summary      This discharge summary is in conjunction with a complete physical exam done on the day of discharge. Attending Physician: Dr. Latesha Escobar  Discharging Physician: Dr. Natali Westbrook: 12/15/2022  Discharge:  12/16/2022    HPI:  80 y.o. female who presented to the hospital with a chief complaint of chest pain. The patient presents with intermittent sternal chest pain that started yesterday. The patient rates the pain as a 5 out of 10 and describes it as achy. The pain is nonradiating and intermittent. Patient has a history of atrial fibrillation on blood thinners. She denies any shortness of breath, fevers or chills. She denies any sick contacts. She will be admitted for observation.      Diagnoses this Admission and Hospital Course   Atypical chest pain   - likely muscular in etiology   - ACS ruled out with neg EKG and neg serial troponins   At her advanced age care will likely be very conservative  Resume home ASA, statins        Atrial fibrillation -Cardiology consulted  HR improved , resume flecainide and digoxin    Diabetes mellitus type 2- resume home meds and monitor with ssi        Hyperlipidemia- resume home statins     Osteoarthritis- left shoulder  pain is chronic   supportive care for now     JASBIR with elevated creatinine- likely with dehydration , resolved with IVF            DVT Prophylaxis: Eliquis    Procedures (Please Review Full Report for Details)  N/A    Consults    Cardiology       Physical Exam at Discharge:    /83   Pulse 54   Temp 97.9 °F (36.6 °C) (Oral)   Resp 17   Ht 5' 4\" (1.626 m)   Wt 114 lb 14.4 oz (52.1 kg)   SpO2 96%   BMI 19.72 kg/m²     See today's progress note    CBC:   Recent Labs     12/15/22  1215 12/16/22  0437   WBC 6.7 5.9   HGB 13.6 13.1   HCT 40.3 39.8   MCV 97.3 98.6    136     BMP:   Recent Labs     12/15/22  1215 12/16/22  0437    140   K 4.7 4.2  108   CO2 19* 21   BUN 30* 26*   CREATININE 1.3* 1.0     LIVER PROFILE:   Recent Labs     12/15/22  1215   AST 22   ALT 12   BILITOT 0.3   ALKPHOS 34*       CARDIAC ENZYMES  Recent Labs     12/15/22  1554 12/15/22  2106 12/16/22  0019   TROPONINI <0.01 <0.01 <0.01         CULTURES  COVID: not detected  Influenza: negative    RADIOLOGY  XR CHEST PORTABLE   Final Result   1. No significant change. Discharge Medications     Medication List        START taking these medications      metoprolol succinate 25 MG extended release tablet  Commonly known as: Toprol XL  Take 1 tablet by mouth daily  Notes to patient: Beta Blockers   Use: treat heart failure, prevent future heart attacks, lower blood pressure and heart rate, treat chest pain  Side effects: Dizziness, fatigue and diarrhea            CHANGE how you take these medications      atorvastatin 20 MG tablet  Commonly known as: LIPITOR  Take 1 tablet by mouth nightly  What changed: how much to take     LORazepam 0.5 MG tablet  Commonly known as: ATIVAN  Take 1 tablet by mouth nightly as needed for Anxiety for up to 30 days.   What changed:   when to take this  reasons to take this            CONTINUE taking these medications      acetaminophen 325 MG tablet  Commonly known as: TYLENOL     B COMPLEX 100 PO     bisacodyl 10 MG suppository  Commonly known as: DULCOLAX     brimonidine 0.2 % ophthalmic solution  Commonly known as: ALPHAGAN     busPIRone 5 MG tablet  Commonly known as: BUSPAR     cetirizine 10 MG chewable tablet  Commonly known as: ZYRTEC     diclofenac sodium 1 % Gel  Commonly known as: VOLTAREN  APPLY 4 G TOPICALLY 4 TIMES DAILY     digoxin 125 MCG tablet  Commonly known as: LANOXIN     dorzolamide 2 % ophthalmic solution  Commonly known as: TRUSOPT     Eliquis 2.5 MG Tabs tablet  Generic drug: apixaban     esomeprazole Magnesium 20 MG Pack  Commonly known as: NEXIUM     fenofibrate 145 MG tablet  Commonly known as: [employer] glimepiride 4 MG tablet  Commonly known as: AMARYL     Klor-Con 10 10 MEQ extended release tablet  Generic drug: potassium chloride     latanoprost 0.005 % ophthalmic solution  Commonly known as: XALATAN     Mag-G 500 (27 Mg) MG Tabs tablet  Generic drug: Magnesium Gluconate     magnesium hydroxide 400 MG/5ML suspension  Commonly known as: MILK OF MAGNESIA     metFORMIN 500 MG tablet  Commonly known as: GLUCOPHAGE     NovoLOG 100 UNIT/ML injection vial  Generic drug: insulin aspart     therapeutic multivitamin-minerals tablet     TRUEtest Test strip  Generic drug: blood glucose test strips     vitamin B-12 1000 MCG tablet  Commonly known as: CYANOCOBALAMIN            STOP taking these medications      famotidine 10 MG tablet  Commonly known as: Pepcid     flecainide 50 MG tablet  Commonly known as: TAMBOCOR               Where to Get Your Medications        You can get these medications from any pharmacy    Bring a paper prescription for each of these medications  LORazepam 0.5 MG tablet       Information about where to get these medications is not yet available    Ask your nurse or doctor about these medications  metoprolol succinate 25 MG extended release tablet           Discharged in stable condition to [facility]    Follow Up: Follow up with PCP.

## 2022-12-16 NOTE — PROGRESS NOTES
Cardiology Consult has been called to [address] on 12/16/22. Left voicemail through answering service.  6:40 AM    Anna Stanley RN  12/16/2022

## 2022-12-16 NOTE — PROGRESS NOTES
PM assessment completed. Scheduled medications given per MAR. VSS room air, A/O x4 with some baseline forgetful denies any needs at this time. Call light in reach, will monitor, bed alarm on.

## 2022-12-16 NOTE — PROGRESS NOTES
ECHO reviewed and no concerning findings. LV function normal with asynchronous septal motion due to known LBBB. OK for d/c from cardiology.    Signing off

## 2022-12-16 NOTE — CARE COORDINATION
Discharge order noted. Met with pt at bedside. Pt stated she lives at [facility] ([facility]) and will return when discharged. Called and spoke with pt's daughter Paras Wynne who confirmed pt will return to [facility] and requested transportation with first opening. Spoke with RN who requested time of 4pm as echo is pending                                                                                                   DISCHARGE ORDER  Date/Time 2022 11:23 AM  Completed by: MELODY Low   Case Management    Patient Name: Willi Garnett    : 1930      Admit order Date and Status:12/15/2022  Noted discharge order. (verify MD's last order for status of admission/Traditional Medicare [place] Inpatient qualifying stay required for SNF)    Confirmed discharge plan with:              Patient:  Yes              When pt confirms DC plan does any support person need to be contacted by CM Yes if yes who pt's daughter Paras Wynne                Discharge to Facility: [facility] ([facility])  Facility phone number for staff giving report: [phone]   Pre-certification completed: n/a pt returning long term care   Hospital Exemption Notification (HENS) completed: n/a pt returning long term care   Discharge orders and Continuity of Care faxed to facility:  [facility] () will pull information from epic and CM does not need to fax anything       Transportation:               Medical Transport explained with choice list offered to pt/family. Choice:Yes(no preference)  Agency used: [facility] ([phone]) juan manuel   time:   4:00pm      Pt/family/Nursing/Facility aware of  time:   Yes Names: pt, pt's daughter Naa Almazan () at [facility], [address] [place] [address]  Ambulance form completed:  yes      Date Last IMM Given: 12/15/2022    Comments: Chart review completed. Met with pt at bedside. She is aware of the pickup time today and in agreement with returning to [facility].  Called and spoke with daughter Barbara Gillette who is in agreement with discharge and states she will come see pt prior to discharge (pt aware). Spoke with Keesha Bateman at [address] who is aware pt is returning today. Katie Larsen RN aware of the pickup time    Pt is being d/c'd to [facility] today. Pt's O2 sats are 96% on Room Air per vitals in epic. Discharge timeout done with Katie Larsen RN. All discharge needs and concerns addressed. Discharging nurse to complete KAREN, reconcile AVS, and place final copy with patient's discharge packet. Discharging RN to ensure that written prescriptions for Level II medications are sent with patient to the facility as per protocol.

## 2022-12-16 NOTE — PROGRESS NOTES
Patient educated on discharge instructions as well as new medications use, dosage, administration and possible side effects. Patient verified knowledge. IV removed without difficulty and dry dressing in place. Telemetry monitor removed and returned to [address]. Pt waiting to leave facility in stable condition to Home with all of their personal belongings.

## 2023-02-06 NOTE — PROGRESS NOTES
Report called to [facility] Jessica to [address] [facility]. Yes

## 2023-10-04 ENCOUNTER — APPOINTMENT (OUTPATIENT)
Dept: CT IMAGING | Age: 88
End: 2023-10-04
Payer: MEDICARE

## 2023-10-04 ENCOUNTER — HOSPITAL ENCOUNTER (EMERGENCY)
Age: 88
Discharge: HOME | End: 2023-10-05
Attending: EMERGENCY MEDICINE
Payer: MEDICARE

## 2023-10-04 VITALS
TEMPERATURE: 98.4 F | OXYGEN SATURATION: 94 % | RESPIRATION RATE: 18 BRPM | DIASTOLIC BLOOD PRESSURE: 68 MMHG | HEART RATE: 64 BPM | SYSTOLIC BLOOD PRESSURE: 163 MMHG

## 2023-10-04 DIAGNOSIS — S20.211A CONTUSION OF RIB ON RIGHT SIDE, INITIAL ENCOUNTER: ICD-10-CM

## 2023-10-04 DIAGNOSIS — S09.90XA INJURY OF HEAD, INITIAL ENCOUNTER: ICD-10-CM

## 2023-10-04 DIAGNOSIS — S01.01XA LACERATION OF SCALP, INITIAL ENCOUNTER: Primary | ICD-10-CM

## 2023-10-04 DIAGNOSIS — S81.811A SKIN TEAR OF RIGHT LOWER LEG WITHOUT COMPLICATION, INITIAL ENCOUNTER: ICD-10-CM

## 2023-10-04 PROCEDURE — 6360000002 HC RX W HCPCS: Performed by: EMERGENCY MEDICINE

## 2023-10-04 PROCEDURE — 99284 EMERGENCY DEPT VISIT MOD MDM: CPT

## 2023-10-04 PROCEDURE — 72125 CT NECK SPINE W/O DYE: CPT

## 2023-10-04 PROCEDURE — 12002 RPR S/N/AX/GEN/TRNK2.6-7.5CM: CPT

## 2023-10-04 PROCEDURE — 6370000000 HC RX 637 (ALT 250 FOR IP): Performed by: EMERGENCY MEDICINE

## 2023-10-04 PROCEDURE — 70450 CT HEAD/BRAIN W/O DYE: CPT

## 2023-10-04 RX ORDER — ACETAMINOPHEN 325 MG/1
650 TABLET ORAL ONCE
Status: COMPLETED | OUTPATIENT
Start: 2023-10-04 | End: 2023-10-04

## 2023-10-04 RX ORDER — EPINEPHRINE IN SOD CHLOR,ISO 1 MG/10 ML
SYRINGE (ML) INTRAVENOUS DAILY PRN
Status: DISCONTINUED | OUTPATIENT
Start: 2023-10-04 | End: 2023-10-05 | Stop reason: HOSPADM

## 2023-10-04 RX ORDER — AMIODARONE HYDROCHLORIDE 50 MG/ML
INJECTION, SOLUTION INTRAVENOUS DAILY PRN
Status: DISCONTINUED | OUTPATIENT
Start: 2023-10-04 | End: 2023-10-05 | Stop reason: HOSPADM

## 2023-10-04 RX ADMIN — EPINEPHRINE 1 MG: 0.1 INJECTION, SOLUTION ENDOTRACHEAL; INTRACARDIAC; INTRAVENOUS at 22:53

## 2023-10-04 RX ADMIN — EPINEPHRINE 1 MG: 0.1 INJECTION, SOLUTION ENDOTRACHEAL; INTRACARDIAC; INTRAVENOUS at 22:42

## 2023-10-04 RX ADMIN — AMIODARONE HYDROCHLORIDE 150 MG: 50 INJECTION, SOLUTION INTRAVENOUS at 22:52

## 2023-10-04 RX ADMIN — ACETAMINOPHEN 650 MG: 325 TABLET ORAL at 22:01

## 2023-10-04 ASSESSMENT — PAIN DESCRIPTION - LOCATION: LOCATION: HEAD

## 2023-10-04 ASSESSMENT — LIFESTYLE VARIABLES
HOW MANY STANDARD DRINKS CONTAINING ALCOHOL DO YOU HAVE ON A TYPICAL DAY: PATIENT DOES NOT DRINK
HOW OFTEN DO YOU HAVE A DRINK CONTAINING ALCOHOL: NEVER

## 2023-10-04 ASSESSMENT — PAIN - FUNCTIONAL ASSESSMENT: PAIN_FUNCTIONAL_ASSESSMENT: 0-10

## 2023-10-04 ASSESSMENT — PAIN SCALES - GENERAL: PAINLEVEL_OUTOF10: 8

## 2023-10-05 ENCOUNTER — APPOINTMENT (OUTPATIENT)
Dept: GENERAL RADIOLOGY | Age: 88
End: 2023-10-05
Payer: MEDICARE

## 2023-10-05 LAB
GLUCOSE BLD-MCNC: 185 MG/DL (ref 70–99)
PERFORMED ON: ABNORMAL

## 2023-10-05 PROCEDURE — 71100 X-RAY EXAM RIBS UNI 2 VIEWS: CPT

## 2023-10-05 PROCEDURE — 96372 THER/PROPH/DIAG INJ SC/IM: CPT

## 2023-10-05 PROCEDURE — 6370000000 HC RX 637 (ALT 250 FOR IP): Performed by: EMERGENCY MEDICINE

## 2023-10-05 RX ORDER — INSULIN GLARGINE 100 [IU]/ML
35 INJECTION, SOLUTION SUBCUTANEOUS NIGHTLY
Status: DISCONTINUED | OUTPATIENT
Start: 2023-10-05 | End: 2023-10-05 | Stop reason: HOSPADM

## 2023-10-05 RX ORDER — LORAZEPAM 1 MG/1
0.5 TABLET ORAL ONCE
Status: COMPLETED | OUTPATIENT
Start: 2023-10-05 | End: 2023-10-05

## 2023-10-05 RX ADMIN — Medication 4.5 ML: at 01:41

## 2023-10-05 RX ADMIN — LORAZEPAM 0.5 MG: 1 TABLET ORAL at 01:40

## 2023-10-05 RX ADMIN — INSULIN GLARGINE 35 UNITS: 100 INJECTION, SOLUTION SUBCUTANEOUS at 01:40

## 2023-10-05 NOTE — DISCHARGE INSTRUCTIONS
Your CT scan did not show any obvious fracture or bleed your brain. The staples in your head will need to come out in approximately 5 to 7 days.   If you develop severe pain or drainage from the wound please come back to the ER for repeat evaluation

## 2023-10-24 ENCOUNTER — HOSPITAL ENCOUNTER (EMERGENCY)
Age: 88
Discharge: HOME | End: 2023-10-24
Attending: EMERGENCY MEDICINE
Payer: MEDICARE

## 2023-10-24 ENCOUNTER — APPOINTMENT (OUTPATIENT)
Dept: CT IMAGING | Age: 88
End: 2023-10-24
Payer: MEDICARE

## 2023-10-24 VITALS
SYSTOLIC BLOOD PRESSURE: 150 MMHG | TEMPERATURE: 98.5 F | RESPIRATION RATE: 18 BRPM | HEART RATE: 64 BPM | DIASTOLIC BLOOD PRESSURE: 65 MMHG | OXYGEN SATURATION: 99 %

## 2023-10-24 DIAGNOSIS — M25.559 PAIN IN JOINT INVOLVING PELVIC REGION AND THIGH, UNSPECIFIED LATERALITY: ICD-10-CM

## 2023-10-24 DIAGNOSIS — S09.90XA INJURY OF HEAD, INITIAL ENCOUNTER: Primary | ICD-10-CM

## 2023-10-24 DIAGNOSIS — Z79.01 ANTICOAGULATED: ICD-10-CM

## 2023-10-24 PROCEDURE — 99284 EMERGENCY DEPT VISIT MOD MDM: CPT

## 2023-10-24 PROCEDURE — 70450 CT HEAD/BRAIN W/O DYE: CPT

## 2023-10-24 PROCEDURE — 72192 CT PELVIS W/O DYE: CPT

## 2023-10-24 RX ORDER — IBUPROFEN 200 MG
400 TABLET ORAL EVERY 6 HOURS PRN
Status: ON HOLD | COMMUNITY
End: 2023-12-28 | Stop reason: HOSPADM

## 2023-10-24 RX ORDER — TORSEMIDE 10 MG/1
10 TABLET ORAL DAILY
Status: ON HOLD | COMMUNITY
End: 2023-12-28 | Stop reason: HOSPADM

## 2023-10-24 RX ORDER — ESCITALOPRAM OXALATE 5 MG/1
5 TABLET ORAL DAILY
COMMUNITY

## 2023-10-24 RX ORDER — INSULIN ASPART 100 [IU]/ML
1 INJECTION, SOLUTION INTRAVENOUS; SUBCUTANEOUS 3 TIMES DAILY
COMMUNITY

## 2023-10-24 RX ORDER — ONDANSETRON 4 MG/1
4 TABLET, ORALLY DISINTEGRATING ORAL EVERY 8 HOURS PRN
COMMUNITY

## 2023-10-24 RX ORDER — ENEMA 19; 7 G/133ML; G/133ML
1 ENEMA RECTAL DAILY PRN
COMMUNITY

## 2023-10-24 RX ORDER — INSULIN GLARGINE 100 [IU]/ML
35 INJECTION, SOLUTION SUBCUTANEOUS DAILY
Status: ON HOLD | COMMUNITY
End: 2023-12-28

## 2023-10-24 RX ORDER — INSULIN ASPART 100 [IU]/ML
5 INJECTION, SOLUTION INTRAVENOUS; SUBCUTANEOUS
Status: ON HOLD | COMMUNITY
End: 2023-12-28 | Stop reason: HOSPADM

## 2023-10-24 RX ORDER — LORAZEPAM 0.5 MG/1
0.5 TABLET ORAL DAILY
Status: ON HOLD | COMMUNITY
End: 2023-12-28 | Stop reason: HOSPADM

## 2023-10-24 ASSESSMENT — PAIN - FUNCTIONAL ASSESSMENT
PAIN_FUNCTIONAL_ASSESSMENT: PREVENTS OR INTERFERES WITH MANY ACTIVE NOT PASSIVE ACTIVITIES
PAIN_FUNCTIONAL_ASSESSMENT: 0-10

## 2023-10-24 ASSESSMENT — PAIN DESCRIPTION - PAIN TYPE: TYPE: ACUTE PAIN;CHRONIC PAIN

## 2023-10-24 ASSESSMENT — LIFESTYLE VARIABLES
HOW OFTEN DO YOU HAVE A DRINK CONTAINING ALCOHOL: NEVER
HOW MANY STANDARD DRINKS CONTAINING ALCOHOL DO YOU HAVE ON A TYPICAL DAY: PATIENT DOES NOT DRINK

## 2023-10-24 ASSESSMENT — PAIN DESCRIPTION - ONSET: ONSET: ON-GOING

## 2023-10-24 ASSESSMENT — PAIN DESCRIPTION - ORIENTATION: ORIENTATION: LEFT

## 2023-10-24 ASSESSMENT — PATIENT HEALTH QUESTIONNAIRE - PHQ9
SUM OF ALL RESPONSES TO PHQ QUESTIONS 1-9: 0
SUM OF ALL RESPONSES TO PHQ QUESTIONS 1-9: 0
2. FEELING DOWN, DEPRESSED OR HOPELESS: 0
SUM OF ALL RESPONSES TO PHQ QUESTIONS 1-9: 0
SUM OF ALL RESPONSES TO PHQ QUESTIONS 1-9: 0
1. LITTLE INTEREST OR PLEASURE IN DOING THINGS: 0
SUM OF ALL RESPONSES TO PHQ9 QUESTIONS 1 & 2: 0

## 2023-10-24 ASSESSMENT — PAIN DESCRIPTION - LOCATION: LOCATION: ARM

## 2023-10-24 ASSESSMENT — PAIN DESCRIPTION - FREQUENCY: FREQUENCY: CONTINUOUS

## 2023-10-24 NOTE — ED PROVIDER NOTES
[facility] EMERGENCY DEPARTMENT  EMERGENCY DEPARTMENT ENCOUNTER      Pt Name: Milagros Fine  MRN: [id number]  Birthdate 1/7/1930  Date of evaluation: 10/24/2023  Provider: ROSY GOMES MD    CHIEF COMPLAINT       Chief Complaint   Patient presents with    Fall         HISTORY OF PRESENT ILLNESS   (Location/Symptom, Timing/Onset, Context/Setting, Quality, Duration, Modifying Factors, Severity)  Note limiting factors.     Milagros Fine is a 89 y.o. female who presents to the emergency department     The history is provided by the patient and a relative.       Nursing Notes were reviewed.    REVIEW OF SYSTEMS    (2-9 systems for level 4, 10 or more for level 5)     Review of Systems    Except as noted above the remainder of the review of systems was reviewed and negative.       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    COVID-19 11/24/20, 11/10/2020    Diabetes mellitus (HCC)     Hyperlipidemia     Hypertension     Osteoporosis 01/15/2019    Retrocalcaneal bursitis 03/28/2017    Secondary osteoarthritis of left shoulder due to rotator cuff arthropathy 07/16/2019    TB (pulmonary tuberculosis) 2010         SURGICAL HISTORY       Past Surgical History:   Procedure Laterality Date    CARDIAC CATHETERIZATION      x 4 all normal    CHOLECYSTECTOMY, LAPAROSCOPIC N/A 05/01/2013    LIVER BIOPSY; EXCISION OF NECROTIC LYMP NODE OF CROQUET; UMBILICAL HERNIA REPAIR    LUNG SURGERY      partial right lower lobectomy    SHOULDER SURGERY      THROAT SURGERY      vocal cord polyp         CURRENT MEDICATIONS       Previous Medications    ACETAMINOPHEN (TYLENOL) 325 MG TABLET    Take 650 mg by mouth every 6 hours as needed for Pain    ATORVASTATIN (LIPITOR) 20 MG TABLET    Take 1 tablet by mouth nightly    B COMPLEX VITAMINS (B COMPLEX 100 PO)    Take by mouth    BISACODYL (DULCOLAX) 10 MG SUPPOSITORY    Place 10 mg rectally daily  signed)  Attending Emergency Physician           Ruben cJ MD  10/24/23 1152

## 2023-10-24 NOTE — ED TRIAGE NOTES
Patient arrives to the ED via EMS from [facility]. Reported unwitnessed fall this AM. Patient reports multiple falls overnight and was able to get herself up. C/o coccyx pain and left arm/shoulder pain. Patient reports injuring the left shoulder/arm years ago. Patient is alert/oriented x4. Talking in full sentences. Family at bedside. at this time.

## 2023-10-24 NOTE — DISCHARGE INSTRUCTIONS
Take Tylenol 500 mg every 4 hours as needed for pain  Please do not get up to the bathroom by yourself please have somebody help you

## 2023-10-24 NOTE — ED NOTES
AVS provided and reviewed with the patient and daughter. The patient and daughter verbalized understanding of care at home, follow up care, and emergent symptoms to return for. No questions or concerns verbalized at this time. The patient is alert, oriented, stable, and assisted out of the department via wheelchair at the time of discharge.        Junais Dimas RN  10/24/23 1200

## 2023-11-24 ENCOUNTER — APPOINTMENT (OUTPATIENT)
Dept: CT IMAGING | Age: 88
End: 2023-11-24
Payer: MEDICARE

## 2023-11-24 ENCOUNTER — HOSPITAL ENCOUNTER (EMERGENCY)
Age: 88
Discharge: HOME | End: 2023-11-24
Attending: EMERGENCY MEDICINE
Payer: MEDICARE

## 2023-11-24 ENCOUNTER — APPOINTMENT (OUTPATIENT)
Dept: GENERAL RADIOLOGY | Age: 88
End: 2023-11-24
Payer: MEDICARE

## 2023-11-24 VITALS
RESPIRATION RATE: 18 BRPM | HEIGHT: 66 IN | BODY MASS INDEX: 20.89 KG/M2 | DIASTOLIC BLOOD PRESSURE: 70 MMHG | SYSTOLIC BLOOD PRESSURE: 114 MMHG | WEIGHT: 130 LBS | HEART RATE: 64 BPM | OXYGEN SATURATION: 98 % | TEMPERATURE: 98.3 F

## 2023-11-24 DIAGNOSIS — G30.9 ALZHEIMER'S DEMENTIA, UNSPECIFIED DEMENTIA SEVERITY, UNSPECIFIED TIMING OF DEMENTIA ONSET, UNSPECIFIED WHETHER BEHAVIORAL, PSYCHOTIC, OR MOOD DISTURBANCE OR ANXIETY (HCC): ICD-10-CM
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
## 2023-11-24 DIAGNOSIS — E86.0 DEHYDRATION: ICD-10-CM

## 2023-11-24 DIAGNOSIS — S09.90XA CLOSED HEAD INJURY, INITIAL ENCOUNTER: ICD-10-CM

## 2023-11-24 DIAGNOSIS — E87.6 HYPOKALEMIA: ICD-10-CM

## 2023-11-24 DIAGNOSIS — N30.00 ACUTE CYSTITIS WITHOUT HEMATURIA: ICD-10-CM

## 2023-11-24 DIAGNOSIS — F02.80 ALZHEIMER'S DEMENTIA, UNSPECIFIED DEMENTIA SEVERITY, UNSPECIFIED TIMING OF DEMENTIA ONSET, UNSPECIFIED WHETHER BEHAVIORAL, PSYCHOTIC, OR MOOD DISTURBANCE OR ANXIETY (HCC): ICD-10-CM

## 2023-11-24 LAB
ALBUMIN SERPL-MCNC: 4 G/DL (ref 3.4–5)
ALBUMIN/GLOB SERPL: 1.1 {RATIO} (ref 1.1–2.2)
ALP SERPL-CCNC: 77 U/L (ref 40–129)
ALT SERPL-CCNC: 37 U/L (ref 10–40)
ANION GAP SERPL CALCULATED.3IONS-SCNC: 14 MMOL/L (ref 3–16)
AST SERPL-CCNC: 48 U/L (ref 15–37)
BACTERIA URNS QL MICRO: ABNORMAL /HPF
BASOPHILS # BLD: 0.1 K/UL (ref 0–0.2)
BASOPHILS NFR BLD: 0.8 %
BILIRUB SERPL-MCNC: 0.5 MG/DL (ref 0–1)
BILIRUB UR QL STRIP.AUTO: NEGATIVE
BUN SERPL-MCNC: 59 MG/DL (ref 7–20)
CALCIUM SERPL-MCNC: 9.9 MG/DL (ref 8.3–10.6)
CHLORIDE SERPL-SCNC: 106 MMOL/L (ref 99–110)
CLARITY UR: ABNORMAL
CO2 SERPL-SCNC: 19 MMOL/L (ref 21–32)
COLOR UR: YELLOW
CREAT SERPL-MCNC: 2.2 MG/DL (ref 0.6–1.2)
DEPRECATED RDW RBC AUTO: 14.3 % (ref 12.4–15.4)
EKG ATRIAL RATE: 58 BPM
EKG DIAGNOSIS: NORMAL
EKG P AXIS: 68 DEGREES
EKG P-R INTERVAL: 232 MS
EKG Q-T INTERVAL: 436 MS
EKG QRS DURATION: 146 MS
EKG QTC CALCULATION (BAZETT): 428 MS
EKG R AXIS: 72 DEGREES
EKG T AXIS: 145 DEGREES
EKG VENTRICULAR RATE: 58 BPM
EOSINOPHIL # BLD: 0.2 K/UL (ref 0–0.6)
EOSINOPHIL NFR BLD: 2.5 %
EPI CELLS #/AREA URNS HPF: ABNORMAL /HPF (ref 0–5)
GFR SERPLBLD CREATININE-BSD FMLA CKD-EPI: 20 ML/MIN/{1.73_M2}
GLUCOSE SERPL-MCNC: 108 MG/DL (ref 70–99)
GLUCOSE UR STRIP.AUTO-MCNC: NEGATIVE MG/DL
HCT VFR BLD AUTO: 37.6 % (ref 36–48)
HGB BLD-MCNC: 13.1 G/DL (ref 12–16)
HGB UR QL STRIP.AUTO: ABNORMAL
KETONES UR STRIP.AUTO-MCNC: NEGATIVE MG/DL
LACTATE BLDV-SCNC: 1.3 MMOL/L (ref 0.4–1.9)
LACTATE BLDV-SCNC: 1.4 MMOL/L (ref 0.4–1.9)
LEUKOCYTE ESTERASE UR QL STRIP.AUTO: ABNORMAL
LYMPHOCYTES # BLD: 2.1 K/UL (ref 1–5.1)
LYMPHOCYTES NFR BLD: 22.2 %
MAGNESIUM SERPL-MCNC: 2.1 MG/DL (ref 1.8–2.4)
MCH RBC QN AUTO: 32.2 PG (ref 26–34)
MCHC RBC AUTO-ENTMCNC: 34.8 G/DL (ref 31–36)
MCV RBC AUTO: 92.7 FL (ref 80–100)
MONOCYTES # BLD: 1 K/UL (ref 0–1.3)
MONOCYTES NFR BLD: 10.1 %
NEUTROPHILS # BLD: 6.1 K/UL (ref 1.7–7.7)
NEUTROPHILS NFR BLD: 64.4 %
NITRITE UR QL STRIP.AUTO: POSITIVE
PH UR STRIP.AUTO: 6 [PH] (ref 5–8)
PLATELET # BLD AUTO: 106 K/UL (ref 135–450)
PMV BLD AUTO: 10.7 FL (ref 5–10.5)
POTASSIUM SERPL-SCNC: 3 MMOL/L (ref 3.5–5.1)
PROT SERPL-MCNC: 7.7 G/DL (ref 6.4–8.2)
PROT UR STRIP.AUTO-MCNC: 30 MG/DL
RBC # BLD AUTO: 4.06 M/UL (ref 4–5.2)
RBC #/AREA URNS HPF: ABNORMAL /HPF (ref 0–4)
SODIUM SERPL-SCNC: 139 MMOL/L (ref 136–145)
SP GR UR STRIP.AUTO: 1.01 (ref 1–1.03)
TROPONIN, HIGH SENSITIVITY: 45 NG/L (ref 0–14)
TROPONIN, HIGH SENSITIVITY: 47 NG/L (ref 0–14)
UA COMPLETE W REFLEX CULTURE PNL UR: YES
UA DIPSTICK W REFLEX MICRO PNL UR: YES
URN SPEC COLLECT METH UR: ABNORMAL
UROBILINOGEN UR STRIP-ACNC: 0.2 E.U./DL
WBC # BLD AUTO: 9.5 K/UL (ref 4–11)
WBC #/AREA URNS HPF: >100 /HPF (ref 0–5)

## 2023-11-24 PROCEDURE — 83605 ASSAY OF LACTIC ACID: CPT

## 2023-11-24 PROCEDURE — 6370000000 HC RX 637 (ALT 250 FOR IP): Performed by: EMERGENCY MEDICINE

## 2023-11-24 PROCEDURE — 80053 COMPREHEN METABOLIC PANEL: CPT

## 2023-11-24 PROCEDURE — 93010 ELECTROCARDIOGRAM REPORT: CPT | Performed by: INTERNAL MEDICINE

## 2023-11-24 PROCEDURE — 96360 HYDRATION IV INFUSION INIT: CPT

## 2023-11-24 PROCEDURE — 93005 ELECTROCARDIOGRAM TRACING: CPT | Performed by: EMERGENCY MEDICINE

## 2023-11-24 PROCEDURE — 72125 CT NECK SPINE W/O DYE: CPT

## 2023-11-24 PROCEDURE — 36415 COLL VENOUS BLD VENIPUNCTURE: CPT

## 2023-11-24 PROCEDURE — 2580000003 HC RX 258: Performed by: EMERGENCY MEDICINE

## 2023-11-24 PROCEDURE — 70450 CT HEAD/BRAIN W/O DYE: CPT

## 2023-11-24 PROCEDURE — 96361 HYDRATE IV INFUSION ADD-ON: CPT

## 2023-11-24 PROCEDURE — 87186 SC STD MICRODIL/AGAR DIL: CPT

## 2023-11-24 PROCEDURE — 71045 X-RAY EXAM CHEST 1 VIEW: CPT

## 2023-11-24 PROCEDURE — 99285 EMERGENCY DEPT VISIT HI MDM: CPT

## 2023-11-24 PROCEDURE — 87086 URINE CULTURE/COLONY COUNT: CPT

## 2023-11-24 PROCEDURE — 81001 URINALYSIS AUTO W/SCOPE: CPT

## 2023-11-24 PROCEDURE — 85025 COMPLETE CBC W/AUTO DIFF WBC: CPT

## 2023-11-24 PROCEDURE — 87077 CULTURE AEROBIC IDENTIFY: CPT

## 2023-11-24 PROCEDURE — 84484 ASSAY OF TROPONIN QUANT: CPT

## 2023-11-24 PROCEDURE — 83735 ASSAY OF MAGNESIUM: CPT

## 2023-11-24 RX ORDER — SODIUM CHLORIDE, SODIUM LACTATE, POTASSIUM CHLORIDE, AND CALCIUM CHLORIDE .6; .31; .03; .02 G/100ML; G/100ML; G/100ML; G/100ML
1000 INJECTION, SOLUTION INTRAVENOUS ONCE
Status: COMPLETED | OUTPATIENT
Start: 2023-11-24 | End: 2023-11-24

## 2023-11-24 RX ORDER — DIPHENHYDRAMINE HYDROCHLORIDE 50 MG/ML
INJECTION INTRAMUSCULAR; INTRAVENOUS
Status: DISCONTINUED
Start: 2023-11-24 | End: 2023-11-24 | Stop reason: HOSPADM

## 2023-11-24 RX ORDER — POTASSIUM CHLORIDE 20 MEQ/1
20 TABLET, EXTENDED RELEASE ORAL DAILY
Qty: 90 TABLET | Refills: 3 | Status: SHIPPED | OUTPATIENT
Start: 2023-11-24 | End: 2024-11-18

## 2023-11-24 RX ORDER — 0.9 % SODIUM CHLORIDE 0.9 %
1000 INTRAVENOUS SOLUTION INTRAVENOUS ONCE
Status: COMPLETED | OUTPATIENT
Start: 2023-11-24 | End: 2023-11-24

## 2023-11-24 RX ADMIN — POTASSIUM BICARBONATE 40 MEQ: 391 TABLET, EFFERVESCENT ORAL at 09:56

## 2023-11-24 RX ADMIN — SODIUM CHLORIDE 1000 ML: 9 INJECTION, SOLUTION INTRAVENOUS at 08:47

## 2023-11-24 RX ADMIN — SODIUM CHLORIDE, POTASSIUM CHLORIDE, SODIUM LACTATE AND CALCIUM CHLORIDE 1000 ML: 600; 310; 30; 20 INJECTION, SOLUTION INTRAVENOUS at 09:55

## 2023-11-24 ASSESSMENT — PAIN - FUNCTIONAL ASSESSMENT
PAIN_FUNCTIONAL_ASSESSMENT: NONE - DENIES PAIN

## 2023-11-24 NOTE — ED PROVIDER NOTES
Take 2 mg by mouth every 6 hours as needed (diarrhea)    LORAZEPAM (ATIVAN) 0.5 MG TABLET    Take 1 tablet by mouth daily. MAG-G 500 (27 MG) MG TABS TABLET    Take 400 mg by mouth daily    MAGNESIUM HYDROXIDE (MILK OF MAGNESIA) 400 MG/5ML SUSPENSION    Take 30 mLs by mouth daily as needed for Constipation    METFORMIN (GLUCOPHAGE) 500 MG TABLET    Take 1 tablet by mouth 2 times daily (with meals)    METOPROLOL SUCCINATE (TOPROL XL) 25 MG EXTENDED RELEASE TABLET    Take 1 tablet by mouth daily    MULTIPLE VITAMINS-MINERALS (THERAPEUTIC MULTIVITAMIN-MINERALS) TABLET    Take 1 tablet by mouth daily    ONDANSETRON (ZOFRAN-ODT) 4 MG DISINTEGRATING TABLET    Take 1 tablet by mouth every 8 hours as needed for Nausea or Vomiting    SODIUM PHOSPHATE (FLEET) 7-19 GM/118ML    Place 1 enema rectally daily as needed    TORSEMIDE (DEMADEX) 10 MG TABLET    Take 1 tablet by mouth daily    TRUETEST TEST STRIP        VITAMIN B-12 (CYANOCOBALAMIN) 1000 MCG TABLET    Take 1 tablet by mouth daily    VITAMIN D (CHOLECALCIFEROL) 125 MCG (5000 UT) CAPS CAPSULE    Take 1 capsule by mouth once a week On Friday       ALLERGIES     Other, Penicillins, Polysporin [bacitracin-polymyxin b], and Adhesive tape    FAMILY HISTORY     History reviewed. No pertinent family history. SOCIAL HISTORY       Social History     Socioeconomic History    Marital status:       Spouse name: None    Number of children: None    Years of education: None    Highest education level: None   Occupational History    Occupation: retired    Tobacco Use    Smoking status: Never    Smokeless tobacco: Never   Vaping Use    Vaping Use: Never used   Substance and Sexual Activity    Alcohol use: No     Alcohol/week: 0.0 standard drinks of alcohol    Drug use: No    Sexual activity: Not Currently     Partners: Male       SCREENINGS    Gisela Coma Scale  Eye Opening: Spontaneous  Best Verbal Response: Oriented  Best Motor Response: Obeys commands  Gisela Coma
none     Procedures      FINAL IMPRESSION      1. Fall, initial encounter    2. Acute cystitis without hematuria            DISPOSITION/PLAN   DISPOSITION          PATIENT REFERRED TO:  No follow-up provider specified.     DISCHARGE MEDICATIONS:  New Prescriptions    No medications on file     Controlled Substances Monitoring:          No data to display                (Please note that portions of this note were completed with a voice recognition program.  Efforts were made to edit the dictations but occasionally words are mis-transcribed.)    Matt Kenney MD (electronically signed)  Attending Emergency Physician            Justin Hills MD  11/24/23 0975

## 2023-11-24 NOTE — ED NOTES
Report called to Shelby Cardona RN @ [facility] in [employer].  Pt assisted to POV via [place] per family's request without incident or c/o's     Jose Barreto RN  11/24/23 9480

## 2023-11-24 NOTE — ED NOTES
Pt care assumed, pt resting with OU closed, resps even and unlab.  Family @ bedside     Lien Wayne RN  11/24/23 2748

## 2023-11-24 NOTE — DISCHARGE INSTRUCTIONS
Take 20 mill equivalents of potassium chloride daily  Continue to take your antibiotics tetracycline till gone  Use bed guardrails  Try to push lots and lots of fluids  Please try to facilitate eating and drinking

## 2023-11-26 LAB
BACTERIA UR CULT: ABNORMAL
ORGANISM: ABNORMAL

## 2023-12-24 ENCOUNTER — APPOINTMENT (OUTPATIENT)
Dept: CT IMAGING | Age: 88
DRG: 392 | End: 2023-12-24
Payer: MEDICARE

## 2023-12-24 ENCOUNTER — HOSPITAL ENCOUNTER (INPATIENT)
Age: 88
LOS: 4 days | Discharge: HOME | DRG: 392 | End: 2023-12-28
Attending: EMERGENCY MEDICINE | Admitting: INTERNAL MEDICINE
Payer: MEDICARE

## 2023-12-24 DIAGNOSIS — E86.0 DEHYDRATION: ICD-10-CM

## 2023-12-24 DIAGNOSIS — R19.7 NAUSEA VOMITING AND DIARRHEA: Primary | ICD-10-CM

## 2023-12-24 DIAGNOSIS — N18.9 CHRONIC KIDNEY DISEASE, UNSPECIFIED CKD STAGE: ICD-10-CM

## 2023-12-24 DIAGNOSIS — R11.2 NAUSEA VOMITING AND DIARRHEA: Primary | ICD-10-CM

## 2023-12-24 DIAGNOSIS — K52.9 CHRONIC DIARRHEA: ICD-10-CM

## 2023-12-24 LAB
ALBUMIN SERPL-MCNC: 4.2 G/DL (ref 3.4–5)
ALBUMIN/GLOB SERPL: 0.9 {RATIO} (ref 1.1–2.2)
ALP SERPL-CCNC: 83 U/L (ref 40–129)
ALT SERPL-CCNC: 32 U/L (ref 10–40)
ANION GAP SERPL CALCULATED.3IONS-SCNC: 16 MMOL/L (ref 3–16)
AST SERPL-CCNC: 55 U/L (ref 15–37)
BACTERIA URNS QL MICRO: ABNORMAL /HPF
BASOPHILS # BLD: 0.1 K/UL (ref 0–0.2)
BASOPHILS NFR BLD: 0.5 %
BILIRUB SERPL-MCNC: 0.6 MG/DL (ref 0–1)
BILIRUB UR QL STRIP.AUTO: ABNORMAL
BUN SERPL-MCNC: 59 MG/DL (ref 7–20)
CALCIUM SERPL-MCNC: 9.8 MG/DL (ref 8.3–10.6)
CHLORIDE SERPL-SCNC: 102 MMOL/L (ref 99–110)
CLARITY UR: CLEAR
CO2 SERPL-SCNC: 16 MMOL/L (ref 21–32)
COLOR UR: YELLOW
CREAT SERPL-MCNC: 2.2 MG/DL (ref 0.6–1.2)
DEPRECATED RDW RBC AUTO: 14.1 % (ref 12.4–15.4)
EOSINOPHIL # BLD: 0.1 K/UL (ref 0–0.6)
EOSINOPHIL NFR BLD: 0.8 %
EPI CELLS #/AREA URNS HPF: ABNORMAL /HPF (ref 0–5)
FLUAV RNA RESP QL NAA+PROBE: NOT DETECTED
FLUBV RNA RESP QL NAA+PROBE: NOT DETECTED
GFR SERPLBLD CREATININE-BSD FMLA CKD-EPI: 20 ML/MIN/{1.73_M2}
GLUCOSE SERPL-MCNC: 94 MG/DL (ref 70–99)
GLUCOSE UR STRIP.AUTO-MCNC: NEGATIVE MG/DL
HCT VFR BLD AUTO: 43.8 % (ref 36–48)
HGB BLD-MCNC: 14.8 G/DL (ref 12–16)
HGB UR QL STRIP.AUTO: NEGATIVE
HYALINE CASTS #/AREA URNS LPF: ABNORMAL /LPF (ref 0–2)
KETONES UR STRIP.AUTO-MCNC: NEGATIVE MG/DL
LACTATE BLDV-SCNC: 1.4 MMOL/L (ref 0.4–1.9)
LACTATE BLDV-SCNC: 2.1 MMOL/L (ref 0.4–1.9)
LEUKOCYTE ESTERASE UR QL STRIP.AUTO: ABNORMAL
LIPASE SERPL-CCNC: 30 U/L (ref 13–60)
LYMPHOCYTES # BLD: 1.3 K/UL (ref 1–5.1)
LYMPHOCYTES NFR BLD: 9.6 %
MCH RBC QN AUTO: 32.7 PG (ref 26–34)
MCHC RBC AUTO-ENTMCNC: 33.9 G/DL (ref 31–36)
MCV RBC AUTO: 96.5 FL (ref 80–100)
MONOCYTES # BLD: 0.9 K/UL (ref 0–1.3)
MONOCYTES NFR BLD: 6.5 %
NEUTROPHILS # BLD: 11 K/UL (ref 1.7–7.7)
NEUTROPHILS NFR BLD: 82.6 %
NITRITE UR QL STRIP.AUTO: NEGATIVE
PH UR STRIP.AUTO: 6 [PH] (ref 5–8)
PLATELET # BLD AUTO: 155 K/UL (ref 135–450)
PMV BLD AUTO: 11.2 FL (ref 5–10.5)
POTASSIUM SERPL-SCNC: 3.8 MMOL/L (ref 3.5–5.1)
PROT SERPL-MCNC: 9.1 G/DL (ref 6.4–8.2)
PROT UR STRIP.AUTO-MCNC: ABNORMAL MG/DL
RBC # BLD AUTO: 4.53 M/UL (ref 4–5.2)
RBC #/AREA URNS HPF: ABNORMAL /HPF (ref 0–4)
RENAL EPI CELLS #/AREA UR COMP ASSIST: ABNORMAL /HPF (ref 0–1)
SARS-COV-2 RNA RESP QL NAA+PROBE: NOT DETECTED
SODIUM SERPL-SCNC: 134 MMOL/L (ref 136–145)
SP GR UR STRIP.AUTO: 1.02 (ref 1–1.03)
UA COMPLETE W REFLEX CULTURE PNL UR: YES
UA DIPSTICK W REFLEX MICRO PNL UR: YES
URN SPEC COLLECT METH UR: ABNORMAL
UROBILINOGEN UR STRIP-ACNC: 0.2 E.U./DL
WBC # BLD AUTO: 13.3 K/UL (ref 4–11)
WBC #/AREA URNS HPF: ABNORMAL /HPF (ref 0–5)

## 2023-12-24 PROCEDURE — 87636 SARSCOV2 & INF A&B AMP PRB: CPT

## 2023-12-24 PROCEDURE — 87506 IADNA-DNA/RNA PROBE TQ 6-11: CPT

## 2023-12-24 PROCEDURE — 87324 CLOSTRIDIUM AG IA: CPT

## 2023-12-24 PROCEDURE — 2060000000 HC ICU INTERMEDIATE R&B

## 2023-12-24 PROCEDURE — 96374 THER/PROPH/DIAG INJ IV PUSH: CPT

## 2023-12-24 PROCEDURE — 96375 TX/PRO/DX INJ NEW DRUG ADDON: CPT

## 2023-12-24 PROCEDURE — 83690 ASSAY OF LIPASE: CPT

## 2023-12-24 PROCEDURE — 6360000002 HC RX W HCPCS: Performed by: EMERGENCY MEDICINE

## 2023-12-24 PROCEDURE — 87449 NOS EACH ORGANISM AG IA: CPT

## 2023-12-24 PROCEDURE — 74176 CT ABD & PELVIS W/O CONTRAST: CPT

## 2023-12-24 PROCEDURE — 99285 EMERGENCY DEPT VISIT HI MDM: CPT

## 2023-12-24 PROCEDURE — 2580000003 HC RX 258: Performed by: EMERGENCY MEDICINE

## 2023-12-24 PROCEDURE — 85025 COMPLETE CBC W/AUTO DIFF WBC: CPT

## 2023-12-24 PROCEDURE — 80053 COMPREHEN METABOLIC PANEL: CPT

## 2023-12-24 PROCEDURE — 36415 COLL VENOUS BLD VENIPUNCTURE: CPT

## 2023-12-24 PROCEDURE — 81001 URINALYSIS AUTO W/SCOPE: CPT

## 2023-12-24 PROCEDURE — 83605 ASSAY OF LACTIC ACID: CPT

## 2023-12-24 PROCEDURE — 87086 URINE CULTURE/COLONY COUNT: CPT

## 2023-12-24 RX ORDER — POTASSIUM CHLORIDE 20 MEQ/1
40 TABLET, EXTENDED RELEASE ORAL PRN
Status: DISCONTINUED | OUTPATIENT
Start: 2023-12-24 | End: 2023-12-25

## 2023-12-24 RX ORDER — MORPHINE SULFATE 4 MG/ML
4 INJECTION, SOLUTION INTRAMUSCULAR; INTRAVENOUS
Status: DISCONTINUED | OUTPATIENT
Start: 2023-12-24 | End: 2023-12-27

## 2023-12-24 RX ORDER — ENOXAPARIN SODIUM 100 MG/ML
40 INJECTION SUBCUTANEOUS DAILY
Status: DISCONTINUED | OUTPATIENT
Start: 2023-12-25 | End: 2023-12-25 | Stop reason: DRUGHIGH

## 2023-12-24 RX ORDER — SODIUM CHLORIDE 0.9 % (FLUSH) 0.9 %
5-40 SYRINGE (ML) INJECTION PRN
Status: DISCONTINUED | OUTPATIENT
Start: 2023-12-24 | End: 2023-12-28 | Stop reason: HOSPADM

## 2023-12-24 RX ORDER — ONDANSETRON 2 MG/ML
4 INJECTION INTRAMUSCULAR; INTRAVENOUS EVERY 6 HOURS PRN
Status: DISCONTINUED | OUTPATIENT
Start: 2023-12-24 | End: 2023-12-28 | Stop reason: HOSPADM

## 2023-12-24 RX ORDER — MORPHINE SULFATE 4 MG/ML
INJECTION, SOLUTION INTRAMUSCULAR; INTRAVENOUS
Status: DISPENSED
Start: 2023-12-24 | End: 2023-12-25

## 2023-12-24 RX ORDER — ONDANSETRON 2 MG/ML
4 INJECTION INTRAMUSCULAR; INTRAVENOUS
Status: DISCONTINUED | OUTPATIENT
Start: 2023-12-24 | End: 2023-12-24

## 2023-12-24 RX ORDER — SODIUM CHLORIDE 0.9 % (FLUSH) 0.9 %
5-40 SYRINGE (ML) INJECTION EVERY 12 HOURS SCHEDULED
Status: DISCONTINUED | OUTPATIENT
Start: 2023-12-24 | End: 2023-12-28 | Stop reason: HOSPADM

## 2023-12-24 RX ORDER — ACETAMINOPHEN 325 MG/1
650 TABLET ORAL EVERY 6 HOURS PRN
Status: DISCONTINUED | OUTPATIENT
Start: 2023-12-24 | End: 2023-12-28 | Stop reason: HOSPADM

## 2023-12-24 RX ORDER — MAGNESIUM HYDROXIDE/ALUMINUM HYDROXICE/SIMETHICONE 120; 1200; 1200 MG/30ML; MG/30ML; MG/30ML
SUSPENSION ORAL
Status: DISPENSED
Start: 2023-12-24 | End: 2023-12-25

## 2023-12-24 RX ORDER — ONDANSETRON 2 MG/ML
INJECTION INTRAMUSCULAR; INTRAVENOUS
Status: DISPENSED
Start: 2023-12-24 | End: 2023-12-25

## 2023-12-24 RX ORDER — POTASSIUM CHLORIDE 7.45 MG/ML
10 INJECTION INTRAVENOUS PRN
Status: DISCONTINUED | OUTPATIENT
Start: 2023-12-24 | End: 2023-12-25

## 2023-12-24 RX ORDER — LIDOCAINE HYDROCHLORIDE 20 MG/ML
SOLUTION OROPHARYNGEAL
Status: DISPENSED
Start: 2023-12-24 | End: 2023-12-25

## 2023-12-24 RX ORDER — SODIUM CHLORIDE 9 MG/ML
INJECTION, SOLUTION INTRAVENOUS PRN
Status: DISCONTINUED | OUTPATIENT
Start: 2023-12-24 | End: 2023-12-28 | Stop reason: HOSPADM

## 2023-12-24 RX ORDER — 0.9 % SODIUM CHLORIDE 0.9 %
1000 INTRAVENOUS SOLUTION INTRAVENOUS ONCE
Status: COMPLETED | OUTPATIENT
Start: 2023-12-24 | End: 2023-12-24

## 2023-12-24 RX ORDER — MAGNESIUM SULFATE IN WATER 40 MG/ML
2000 INJECTION, SOLUTION INTRAVENOUS PRN
Status: DISCONTINUED | OUTPATIENT
Start: 2023-12-24 | End: 2023-12-25

## 2023-12-24 RX ADMIN — SODIUM CHLORIDE 1000 ML: 9 INJECTION, SOLUTION INTRAVENOUS at 17:15

## 2023-12-24 RX ADMIN — ONDANSETRON 4 MG: 2 INJECTION INTRAMUSCULAR; INTRAVENOUS at 17:14

## 2023-12-24 RX ADMIN — MORPHINE SULFATE 4 MG: 4 INJECTION, SOLUTION INTRAMUSCULAR; INTRAVENOUS at 17:14

## 2023-12-25 PROBLEM — R19.7 NAUSEA VOMITING AND DIARRHEA: Status: ACTIVE | Noted: 2023-12-25

## 2023-12-25 PROBLEM — F02.80 DEMENTIA DUE TO ALZHEIMER'S DISEASE (HCC): Status: ACTIVE | Noted: 2019-09-12

## 2023-12-25 PROBLEM — G30.9 DEMENTIA DUE TO ALZHEIMER'S DISEASE (HCC): Status: ACTIVE | Noted: 2019-09-12

## 2023-12-25 PROBLEM — R11.2 NAUSEA VOMITING AND DIARRHEA: Status: ACTIVE | Noted: 2023-12-25

## 2023-12-25 PROBLEM — N17.9 AKI (ACUTE KIDNEY INJURY) (HCC): Status: ACTIVE | Noted: 2023-12-25

## 2023-12-25 LAB
ANION GAP SERPL CALCULATED.3IONS-SCNC: 18 MMOL/L (ref 3–16)
BASOPHILS # BLD: 0.1 K/UL (ref 0–0.2)
BASOPHILS NFR BLD: 0.8 %
BUN SERPL-MCNC: 61 MG/DL (ref 7–20)
C DIFF TOX A+B STL QL IA: NORMAL
CALCIUM SERPL-MCNC: 9.1 MG/DL (ref 8.3–10.6)
CHLORIDE SERPL-SCNC: 110 MMOL/L (ref 99–110)
CO2 SERPL-SCNC: 14 MMOL/L (ref 21–32)
CREAT SERPL-MCNC: 1.8 MG/DL (ref 0.6–1.2)
DEPRECATED RDW RBC AUTO: 14 % (ref 12.4–15.4)
EOSINOPHIL # BLD: 0.1 K/UL (ref 0–0.6)
EOSINOPHIL NFR BLD: 1.3 %
GFR SERPLBLD CREATININE-BSD FMLA CKD-EPI: 26 ML/MIN/{1.73_M2}
GLUCOSE BLD-MCNC: 171 MG/DL (ref 70–99)
GLUCOSE BLD-MCNC: 176 MG/DL (ref 70–99)
GLUCOSE BLD-MCNC: 179 MG/DL (ref 70–99)
GLUCOSE BLD-MCNC: 198 MG/DL (ref 70–99)
GLUCOSE BLD-MCNC: 296 MG/DL (ref 70–99)
GLUCOSE SERPL-MCNC: 224 MG/DL (ref 70–99)
HCT VFR BLD AUTO: 40.6 % (ref 36–48)
HGB BLD-MCNC: 13.1 G/DL (ref 12–16)
LYMPHOCYTES # BLD: 2.6 K/UL (ref 1–5.1)
LYMPHOCYTES NFR BLD: 24.2 %
MAGNESIUM SERPL-MCNC: 1.5 MG/DL (ref 1.8–2.4)
MCH RBC QN AUTO: 32.1 PG (ref 26–34)
MCHC RBC AUTO-ENTMCNC: 32.2 G/DL (ref 31–36)
MCV RBC AUTO: 99.4 FL (ref 80–100)
MONOCYTES # BLD: 0.7 K/UL (ref 0–1.3)
MONOCYTES NFR BLD: 6.3 %
NEUTROPHILS # BLD: 7.2 K/UL (ref 1.7–7.7)
NEUTROPHILS NFR BLD: 67.4 %
PERFORMED ON: ABNORMAL
PLATELET # BLD AUTO: 144 K/UL (ref 135–450)
PMV BLD AUTO: 11.5 FL (ref 5–10.5)
POTASSIUM SERPL-SCNC: 3.3 MMOL/L (ref 3.5–5.1)
PROCALCITONIN SERPL IA-MCNC: 0.4 NG/ML (ref 0–0.15)
RBC # BLD AUTO: 4.09 M/UL (ref 4–5.2)
SODIUM SERPL-SCNC: 142 MMOL/L (ref 136–145)
WBC # BLD AUTO: 10.7 K/UL (ref 4–11)

## 2023-12-25 PROCEDURE — 83735 ASSAY OF MAGNESIUM: CPT

## 2023-12-25 PROCEDURE — 2580000003 HC RX 258: Performed by: INTERNAL MEDICINE

## 2023-12-25 PROCEDURE — 80048 BASIC METABOLIC PNL TOTAL CA: CPT

## 2023-12-25 PROCEDURE — 85025 COMPLETE CBC W/AUTO DIFF WBC: CPT

## 2023-12-25 PROCEDURE — 6370000000 HC RX 637 (ALT 250 FOR IP): Performed by: INTERNAL MEDICINE

## 2023-12-25 PROCEDURE — 36415 COLL VENOUS BLD VENIPUNCTURE: CPT

## 2023-12-25 PROCEDURE — 2060000000 HC ICU INTERMEDIATE R&B

## 2023-12-25 PROCEDURE — 2500000003 HC RX 250 WO HCPCS: Performed by: INTERNAL MEDICINE

## 2023-12-25 PROCEDURE — 6360000002 HC RX W HCPCS: Performed by: INTERNAL MEDICINE

## 2023-12-25 PROCEDURE — 99233 SBSQ HOSP IP/OBS HIGH 50: CPT | Performed by: INTERNAL MEDICINE

## 2023-12-25 PROCEDURE — C9113 INJ PANTOPRAZOLE SODIUM, VIA: HCPCS | Performed by: INTERNAL MEDICINE

## 2023-12-25 PROCEDURE — 84145 PROCALCITONIN (PCT): CPT

## 2023-12-25 RX ORDER — HEPARIN SODIUM 5000 [USP'U]/ML
5000 INJECTION, SOLUTION INTRAVENOUS; SUBCUTANEOUS 2 TIMES DAILY
Status: DISCONTINUED | OUTPATIENT
Start: 2023-12-25 | End: 2023-12-28 | Stop reason: HOSPADM

## 2023-12-25 RX ORDER — ATORVASTATIN CALCIUM 10 MG/1
20 TABLET, FILM COATED ORAL NIGHTLY
Status: DISCONTINUED | OUTPATIENT
Start: 2023-12-25 | End: 2023-12-28 | Stop reason: HOSPADM

## 2023-12-25 RX ORDER — PANTOPRAZOLE SODIUM 40 MG/1
40 TABLET, DELAYED RELEASE ORAL
Status: DISCONTINUED | OUTPATIENT
Start: 2023-12-25 | End: 2023-12-25

## 2023-12-25 RX ORDER — INSULIN LISPRO 100 [IU]/ML
0-4 INJECTION, SOLUTION INTRAVENOUS; SUBCUTANEOUS NIGHTLY
Status: DISCONTINUED | OUTPATIENT
Start: 2023-12-25 | End: 2023-12-28 | Stop reason: HOSPADM

## 2023-12-25 RX ORDER — BENZOCAINE/MENTHOL 6 MG-10 MG
1 LOZENGE MUCOUS MEMBRANE EVERY 8 HOURS
COMMUNITY

## 2023-12-25 RX ORDER — GLUCAGON 1 MG/ML
1 KIT INJECTION PRN
Status: DISCONTINUED | OUTPATIENT
Start: 2023-12-25 | End: 2023-12-28 | Stop reason: HOSPADM

## 2023-12-25 RX ORDER — INSULIN GLARGINE 100 [IU]/ML
15 INJECTION, SOLUTION SUBCUTANEOUS DAILY
Status: DISCONTINUED | OUTPATIENT
Start: 2023-12-25 | End: 2023-12-28 | Stop reason: HOSPADM

## 2023-12-25 RX ORDER — DEXTROSE MONOHYDRATE 100 MG/ML
INJECTION, SOLUTION INTRAVENOUS CONTINUOUS PRN
Status: DISCONTINUED | OUTPATIENT
Start: 2023-12-25 | End: 2023-12-28 | Stop reason: HOSPADM

## 2023-12-25 RX ORDER — METRONIDAZOLE 500 MG/100ML
500 INJECTION, SOLUTION INTRAVENOUS EVERY 8 HOURS
Status: DISCONTINUED | OUTPATIENT
Start: 2023-12-25 | End: 2023-12-28 | Stop reason: HOSPADM

## 2023-12-25 RX ORDER — SODIUM CHLORIDE 9 MG/ML
INJECTION, SOLUTION INTRAVENOUS CONTINUOUS
Status: DISCONTINUED | OUTPATIENT
Start: 2023-12-25 | End: 2023-12-25

## 2023-12-25 RX ORDER — METOPROLOL SUCCINATE 25 MG/1
25 TABLET, EXTENDED RELEASE ORAL DAILY
Status: DISCONTINUED | OUTPATIENT
Start: 2023-12-25 | End: 2023-12-28 | Stop reason: HOSPADM

## 2023-12-25 RX ORDER — PANTOPRAZOLE SODIUM 40 MG/10ML
40 INJECTION, POWDER, LYOPHILIZED, FOR SOLUTION INTRAVENOUS 2 TIMES DAILY
Status: DISCONTINUED | OUTPATIENT
Start: 2023-12-25 | End: 2023-12-27

## 2023-12-25 RX ORDER — GUAIFENESIN AND DEXTROMETHORPHAN HYDROBROMIDE 100; 10 MG/5ML; MG/5ML
10 SOLUTION ORAL EVERY 4 HOURS PRN
COMMUNITY

## 2023-12-25 RX ORDER — INSULIN LISPRO 100 [IU]/ML
0-8 INJECTION, SOLUTION INTRAVENOUS; SUBCUTANEOUS
Status: DISCONTINUED | OUTPATIENT
Start: 2023-12-25 | End: 2023-12-28 | Stop reason: HOSPADM

## 2023-12-25 RX ORDER — ESOMEPRAZOLE MAGNESIUM 20 MG/1
20 GRANULE, DELAYED RELEASE ORAL DAILY
Status: DISCONTINUED | OUTPATIENT
Start: 2023-12-25 | End: 2023-12-25 | Stop reason: CLARIF

## 2023-12-25 RX ADMIN — INSULIN GLARGINE 15 UNITS: 100 INJECTION, SOLUTION SUBCUTANEOUS at 08:58

## 2023-12-25 RX ADMIN — CEFTRIAXONE 1000 MG: 1 INJECTION, POWDER, FOR SOLUTION INTRAMUSCULAR; INTRAVENOUS at 09:01

## 2023-12-25 RX ADMIN — METRONIDAZOLE 500 MG: 500 INJECTION, SOLUTION INTRAVENOUS at 09:04

## 2023-12-25 RX ADMIN — SODIUM BICARBONATE: 84 INJECTION, SOLUTION INTRAVENOUS at 22:54

## 2023-12-25 RX ADMIN — Medication 10 ML: at 21:42

## 2023-12-25 RX ADMIN — SODIUM BICARBONATE: 84 INJECTION, SOLUTION INTRAVENOUS at 10:52

## 2023-12-25 RX ADMIN — Medication 10 ML: at 08:37

## 2023-12-25 RX ADMIN — PANTOPRAZOLE SODIUM 40 MG: 40 TABLET, DELAYED RELEASE ORAL at 08:57

## 2023-12-25 RX ADMIN — METOPROLOL SUCCINATE 25 MG: 25 TABLET, EXTENDED RELEASE ORAL at 08:57

## 2023-12-25 RX ADMIN — HEPARIN SODIUM 5000 UNITS: 5000 INJECTION INTRAVENOUS; SUBCUTANEOUS at 08:57

## 2023-12-25 RX ADMIN — ONDANSETRON 4 MG: 2 INJECTION INTRAMUSCULAR; INTRAVENOUS at 09:05

## 2023-12-25 RX ADMIN — SODIUM CHLORIDE: 0.9 INJECTION, SOLUTION INTRAVENOUS at 10:01

## 2023-12-25 RX ADMIN — PANTOPRAZOLE SODIUM 40 MG: 40 INJECTION, POWDER, FOR SOLUTION INTRAVENOUS at 21:30

## 2023-12-25 RX ADMIN — ATORVASTATIN CALCIUM 20 MG: 10 TABLET, FILM COATED ORAL at 21:31

## 2023-12-25 RX ADMIN — HEPARIN SODIUM 5000 UNITS: 5000 INJECTION INTRAVENOUS; SUBCUTANEOUS at 21:30

## 2023-12-25 RX ADMIN — METRONIDAZOLE 500 MG: 500 INJECTION, SOLUTION INTRAVENOUS at 17:33

## 2023-12-25 NOTE — ED PROVIDER NOTES
so CAT scan was obtained. This thankfully did not show any significant pathology. Given her symptoms however I believe she would benefit from hospitalization for further treatment and evaluation. Family also requested that she be admitted. She previously had a DNR Comfort Care order and was in hospice but they revoked this for the time being and accordance with her wishes that she received treatment. She agrees. Differential Diagnosis: Sepsis, bowel obstruction, dehydration, gastroenteritis      The total Critical Care time is 40  minutes which excludes separately billable procedures. The critical care was concerning IV fluid bolus for treatment of dehydration. This time is exclusive of any time documented by any other providers. I am the Primary Clinician of Record. FINAL IMPRESSION      1. Nausea vomiting and diarrhea    2. Dehydration    3. Chronic kidney disease, unspecified CKD stage          DISPOSITION/PLAN     Pt is in stable condition upon Admit to telemetry. PATIENT REFERRED TO:  No follow-up provider specified. DISCHARGE MEDICATIONS:  New Prescriptions    No medications on file       DISCONTINUED MEDICATIONS:  Discontinued Medications    No medications on file              (Please note that portions of this note were completed with a voice recognition program.  Efforts were made to edit the dictations but occasionally words are mis-transcribed.)    Sandhya Orellana MD (electronically signed)          This chart was generated using the PathJump[employer] system. I created this record but it may contain dictation errors.           Sandhya Orellana MD  12/24/23 5554

## 2023-12-25 NOTE — CONSULTS
Consultation Note    Patient Name: Randall Narayanan  : 13[birth year]  Age: 80 y.o. Admitting Physician: Nithya Munoz,*   Date of Admission: 2023  4:26 PM   Primary Care Physician: Home, [facility] is being seen at the request of Nithya Munoz,* for gastroenteritis. History of Present Illness:  80year old female with HTN, Dyslipidemia, Atrial fibrillation on anticoagulation presenting with diarrhea and abdominal discomfort. CT abdomen showed possible ileus\colitis. Patient stated on empiric Recephin and flagyl. Stool negative for C diff. Gi pathogens pending. Patient's daughter states diarrhea is chronic over several years but slowly worsening. The patient states 4 bowel movements so far today. NO rectal bleeding reported. The patient denies any abdominal pain, nausea at present. Has chronic reflux and epigastric burning    GI History:  NO prior scopes. Past Medical History:  Past Medical History:   Diagnosis Date    Arthritis     CAD (coronary artery disease)     Cancer of cheek ([facility]) 04/15/2010    COVID-19 20, 11/10/2020    Diabetes mellitus ([address])     Hyperlipidemia     Hypertension     Osteoporosis 01/15/2019    Retrocalcaneal bursitis 2017    Secondary osteoarthritis of left shoulder due to rotator cuff arthropathy 2019    TB (pulmonary tuberculosis)         Past Surgical History:  Past Surgical History:   Procedure Laterality Date    CARDIAC CATHETERIZATION      x 4 all normal    CHOLECYSTECTOMY, LAPAROSCOPIC N/A 2013    LIVER BIOPSY; EXCISION OF NECROTIC LYMP NODE OF CROQUET; UMBILICAL HERNIA REPAIR    LUNG SURGERY      partial right lower lobectomy    SHOULDER SURGERY      THROAT SURGERY      vocal cord polyp        Historical Medications:  Prior to Visit Medications    Medication Sig Taking?  Authorizing Provider   Dextromethorphan-guaiFENesin  MG/5ML SYRP Take 10 mLs by mouth every 4 hours as

## 2023-12-25 NOTE — PLAN OF CARE
Problem: Discharge Planning  Goal: Discharge to home or other facility with appropriate resources  12/25/2023 1323 by Ras Goldman RN  Outcome: Progressing  53/67/0569 [id number] by ANA MARIA GOLDBERG  Outcome: Progressing     Problem: Skin/Tissue Integrity  Goal: Absence of new skin breakdown  Description: 1. Monitor for areas of redness and/or skin breakdown  2. Assess vascular access sites hourly  3. Every 4-6 hours minimum:  Change oxygen saturation probe site  4. Every 4-6 hours:  If on nasal continuous positive airway pressure, respiratory therapy assess nares and determine need for appliance change or resting period.   Outcome: Progressing     Problem: Safety - Adult  Goal: Free from fall injury  Outcome: Progressing

## 2023-12-25 NOTE — H&P
V2.0  History and Physical      Name:  Maira Henriquez /Age/Sex: 1930  (80 y.o. female)   MRN & CSN:  [id number] & [id number] Encounter Date/Time: 2023 9:10 PM EST   Location:   PCP: [facility], [facility] [address] [place] [address] Day: 1    Assessment and Plan:   Maira Henriquez is a 80 y.o. female with a pmh of of hypertension, hyperlipidemia, chronic atrial fibrillation on anticoagulation, diabetes, coronary artery disease underlying dementia  who presents with <principal problem not specified>        Plan:  Acute gastroenteritis  Hypovolemic hyponatremia   Acute kidney injury  Diabetes mellitus uncontrolled  -Lactic acid improved from 2.1-1  -C. difficile pending  -CT abdomen :there small air-fluid levels scattered  in the colon with no obvious wall thickening or significant bowel dilatation. The small bowel is normal caliber with no bowel wall thickening  -Empirical up to antibiotic ceftriaxone metronidazole  -Judicious hydration  -Reduce insulin home dose long-acting insulin 15 units and low sliding scale        Disposition:   Current Living situation: Nursing home  Expected Disposition: Nursing home  Estimated D/C: 3 days     Diet No diet orders on file   DVT Prophylaxis [x] Lovenox, []  Heparin, [] SCDs, [] Ambulation,  [] Eliquis, [] Xarelto, [] Coumadin   Code Status Prior   Surrogate Decision Maker/ POA Self     Personally reviewed Lab Studies and Imaging     Discussed management of the case with ED physician who recommended admission    History from:     patient    History of Present Illness:     Chief Complaint: vomiting and diarrhea   Maira Henriquez is a 80 y.o. female with pmh of of hypertension, hyperlipidemia, chronic atrial fibrillation on anticoagulation, DM , CAD, underlying dementia  who presents with ECF for vomiting and diarrhea.     Watery diarrhea does not contain mucus or blood ,  She continued to complain of some abdominal discomfort so CAT scan was obtained which showed no

## 2023-12-26 ENCOUNTER — ANESTHESIA EVENT (OUTPATIENT)
Dept: ENDOSCOPY | Age: 88
DRG: 392 | End: 2023-12-26
Payer: MEDICARE

## 2023-12-26 PROBLEM — E87.6 HYPOKALEMIA: Status: ACTIVE | Noted: 2023-12-26

## 2023-12-26 PROBLEM — E86.0 DEHYDRATION: Status: ACTIVE | Noted: 2023-12-26

## 2023-12-26 LAB
ANION GAP SERPL CALCULATED.3IONS-SCNC: 13 MMOL/L (ref 3–16)
ANION GAP SERPL CALCULATED.3IONS-SCNC: 13 MMOL/L (ref 3–16)
BACTERIA UR CULT: NORMAL
BASOPHILS # BLD: 0.1 K/UL (ref 0–0.2)
BASOPHILS NFR BLD: 0.8 %
BUN SERPL-MCNC: 29 MG/DL (ref 7–20)
BUN SERPL-MCNC: 46 MG/DL (ref 7–20)
CALCIUM SERPL-MCNC: 7.9 MG/DL (ref 8.3–10.6)
CALCIUM SERPL-MCNC: 8.3 MG/DL (ref 8.3–10.6)
CHLORIDE SERPL-SCNC: 106 MMOL/L (ref 99–110)
CHLORIDE SERPL-SCNC: 107 MMOL/L (ref 99–110)
CO2 SERPL-SCNC: 24 MMOL/L (ref 21–32)
CO2 SERPL-SCNC: 25 MMOL/L (ref 21–32)
CREAT SERPL-MCNC: 1 MG/DL (ref 0.6–1.2)
CREAT SERPL-MCNC: 1.3 MG/DL (ref 0.6–1.2)
DEPRECATED RDW RBC AUTO: 13.6 % (ref 12.4–15.4)
EOSINOPHIL # BLD: 0.4 K/UL (ref 0–0.6)
EOSINOPHIL NFR BLD: 5.3 %
GFR SERPLBLD CREATININE-BSD FMLA CKD-EPI: 38 ML/MIN/{1.73_M2}
GFR SERPLBLD CREATININE-BSD FMLA CKD-EPI: 52 ML/MIN/{1.73_M2}
GI PATHOGENS PNL STL NAA+PROBE: NORMAL
GLUCOSE BLD-MCNC: 128 MG/DL (ref 70–99)
GLUCOSE BLD-MCNC: 163 MG/DL (ref 70–99)
GLUCOSE BLD-MCNC: 170 MG/DL (ref 70–99)
GLUCOSE BLD-MCNC: 189 MG/DL (ref 70–99)
GLUCOSE SERPL-MCNC: 163 MG/DL (ref 70–99)
GLUCOSE SERPL-MCNC: 195 MG/DL (ref 70–99)
HCT VFR BLD AUTO: 31.7 % (ref 36–48)
HGB BLD-MCNC: 11.1 G/DL (ref 12–16)
LYMPHOCYTES # BLD: 1.7 K/UL (ref 1–5.1)
LYMPHOCYTES NFR BLD: 25 %
MAGNESIUM SERPL-MCNC: 1.4 MG/DL (ref 1.8–2.4)
MCH RBC QN AUTO: 33.3 PG (ref 26–34)
MCHC RBC AUTO-ENTMCNC: 35 G/DL (ref 31–36)
MCV RBC AUTO: 95.1 FL (ref 80–100)
MONOCYTES # BLD: 0.5 K/UL (ref 0–1.3)
MONOCYTES NFR BLD: 7.9 %
NEUTROPHILS # BLD: 4.1 K/UL (ref 1.7–7.7)
NEUTROPHILS NFR BLD: 61 %
PERFORMED ON: ABNORMAL
PLATELET # BLD AUTO: 95 K/UL (ref 135–450)
PLATELET BLD QL SMEAR: ABNORMAL
PMV BLD AUTO: 11.6 FL (ref 5–10.5)
POTASSIUM SERPL-SCNC: 2.5 MMOL/L (ref 3.5–5.1)
POTASSIUM SERPL-SCNC: 2.9 MMOL/L (ref 3.5–5.1)
RBC # BLD AUTO: 3.34 M/UL (ref 4–5.2)
SLIDE REVIEW: ABNORMAL
SODIUM SERPL-SCNC: 143 MMOL/L (ref 136–145)
SODIUM SERPL-SCNC: 145 MMOL/L (ref 136–145)
WBC # BLD AUTO: 6.7 K/UL (ref 4–11)

## 2023-12-26 PROCEDURE — 83735 ASSAY OF MAGNESIUM: CPT

## 2023-12-26 PROCEDURE — 6360000002 HC RX W HCPCS: Performed by: INTERNAL MEDICINE

## 2023-12-26 PROCEDURE — 2580000003 HC RX 258: Performed by: INTERNAL MEDICINE

## 2023-12-26 PROCEDURE — 85025 COMPLETE CBC W/AUTO DIFF WBC: CPT

## 2023-12-26 PROCEDURE — 2060000000 HC ICU INTERMEDIATE R&B

## 2023-12-26 PROCEDURE — 36415 COLL VENOUS BLD VENIPUNCTURE: CPT

## 2023-12-26 PROCEDURE — 99232 SBSQ HOSP IP/OBS MODERATE 35: CPT | Performed by: INTERNAL MEDICINE

## 2023-12-26 PROCEDURE — C9113 INJ PANTOPRAZOLE SODIUM, VIA: HCPCS | Performed by: INTERNAL MEDICINE

## 2023-12-26 PROCEDURE — 6370000000 HC RX 637 (ALT 250 FOR IP): Performed by: REGISTERED NURSE

## 2023-12-26 PROCEDURE — 6370000000 HC RX 637 (ALT 250 FOR IP): Performed by: INTERNAL MEDICINE

## 2023-12-26 PROCEDURE — 80053 COMPREHEN METABOLIC PANEL: CPT

## 2023-12-26 PROCEDURE — 84100 ASSAY OF PHOSPHORUS: CPT

## 2023-12-26 PROCEDURE — 6370000000 HC RX 637 (ALT 250 FOR IP)

## 2023-12-26 RX ORDER — POTASSIUM CHLORIDE 20 MEQ/1
20 TABLET, EXTENDED RELEASE ORAL ONCE
Status: COMPLETED | OUTPATIENT
Start: 2023-12-26 | End: 2023-12-26

## 2023-12-26 RX ORDER — SODIUM CHLORIDE, SODIUM LACTATE, POTASSIUM CHLORIDE, CALCIUM CHLORIDE 600; 310; 30; 20 MG/100ML; MG/100ML; MG/100ML; MG/100ML
INJECTION, SOLUTION INTRAVENOUS CONTINUOUS
Status: DISCONTINUED | OUTPATIENT
Start: 2023-12-26 | End: 2023-12-27

## 2023-12-26 RX ORDER — LANOLIN ALCOHOL/MO/W.PET/CERES
400 CREAM (GRAM) TOPICAL
Status: COMPLETED | OUTPATIENT
Start: 2023-12-26 | End: 2023-12-26

## 2023-12-26 RX ORDER — POTASSIUM CHLORIDE 20 MEQ/1
60 TABLET, EXTENDED RELEASE ORAL ONCE
Status: DISCONTINUED | OUTPATIENT
Start: 2023-12-26 | End: 2023-12-26 | Stop reason: ALTCHOICE

## 2023-12-26 RX ORDER — MAGNESIUM SULFATE IN WATER 40 MG/ML
2000 INJECTION, SOLUTION INTRAVENOUS ONCE
Status: COMPLETED | OUTPATIENT
Start: 2023-12-26 | End: 2023-12-26

## 2023-12-26 RX ORDER — DEXTROSE, SODIUM CHLORIDE, SODIUM LACTATE, POTASSIUM CHLORIDE, AND CALCIUM CHLORIDE 5; .6; .31; .03; .02 G/100ML; G/100ML; G/100ML; G/100ML; G/100ML
INJECTION, SOLUTION INTRAVENOUS CONTINUOUS
Status: DISCONTINUED | OUTPATIENT
Start: 2023-12-26 | End: 2023-12-26

## 2023-12-26 RX ORDER — POTASSIUM CHLORIDE 20 MEQ/1
40 TABLET, EXTENDED RELEASE ORAL ONCE
Status: COMPLETED | OUTPATIENT
Start: 2023-12-26 | End: 2023-12-26

## 2023-12-26 RX ORDER — POTASSIUM CHLORIDE 20 MEQ/1
60 TABLET, EXTENDED RELEASE ORAL ONCE
Status: COMPLETED | OUTPATIENT
Start: 2023-12-26 | End: 2023-12-26

## 2023-12-26 RX ADMIN — METRONIDAZOLE 500 MG: 500 INJECTION, SOLUTION INTRAVENOUS at 01:48

## 2023-12-26 RX ADMIN — Medication 10 ML: at 20:52

## 2023-12-26 RX ADMIN — HEPARIN SODIUM 5000 UNITS: 5000 INJECTION INTRAVENOUS; SUBCUTANEOUS at 20:52

## 2023-12-26 RX ADMIN — POTASSIUM CHLORIDE 60 MEQ: 1500 TABLET, EXTENDED RELEASE ORAL at 20:52

## 2023-12-26 RX ADMIN — PANTOPRAZOLE SODIUM 40 MG: 40 INJECTION, POWDER, FOR SOLUTION INTRAVENOUS at 09:26

## 2023-12-26 RX ADMIN — INSULIN GLARGINE 15 UNITS: 100 INJECTION, SOLUTION SUBCUTANEOUS at 09:20

## 2023-12-26 RX ADMIN — CEFTRIAXONE 1000 MG: 1 INJECTION, POWDER, FOR SOLUTION INTRAMUSCULAR; INTRAVENOUS at 11:00

## 2023-12-26 RX ADMIN — METRONIDAZOLE 500 MG: 500 INJECTION, SOLUTION INTRAVENOUS at 09:06

## 2023-12-26 RX ADMIN — MAGNESIUM SULFATE HEPTAHYDRATE 2000 MG: 40 INJECTION, SOLUTION INTRAVENOUS at 10:06

## 2023-12-26 RX ADMIN — ATORVASTATIN CALCIUM 20 MG: 10 TABLET, FILM COATED ORAL at 20:52

## 2023-12-26 RX ADMIN — PANTOPRAZOLE SODIUM 40 MG: 40 INJECTION, POWDER, FOR SOLUTION INTRAVENOUS at 20:52

## 2023-12-26 RX ADMIN — POTASSIUM CHLORIDE 20 MEQ: 1500 TABLET, EXTENDED RELEASE ORAL at 11:05

## 2023-12-26 RX ADMIN — ONDANSETRON 4 MG: 2 INJECTION INTRAMUSCULAR; INTRAVENOUS at 21:22

## 2023-12-26 RX ADMIN — POTASSIUM CHLORIDE 20 MEQ: 1500 TABLET, EXTENDED RELEASE ORAL at 22:50

## 2023-12-26 RX ADMIN — HEPARIN SODIUM 5000 UNITS: 5000 INJECTION INTRAVENOUS; SUBCUTANEOUS at 09:17

## 2023-12-26 RX ADMIN — SODIUM CHLORIDE, POTASSIUM CHLORIDE, SODIUM LACTATE AND CALCIUM CHLORIDE: 600; 310; 30; 20 INJECTION, SOLUTION INTRAVENOUS at 09:28

## 2023-12-26 RX ADMIN — POTASSIUM CHLORIDE 40 MEQ: 1500 TABLET, EXTENDED RELEASE ORAL at 07:48

## 2023-12-26 RX ADMIN — METRONIDAZOLE 500 MG: 500 INJECTION, SOLUTION INTRAVENOUS at 17:43

## 2023-12-26 RX ADMIN — POLYETHYLENE GLYCOL-3350 AND ELECTROLYTES 4000 ML: 236; 6.74; 5.86; 2.97; 22.74 POWDER, FOR SOLUTION ORAL at 17:37

## 2023-12-26 RX ADMIN — Medication 400 MG: at 20:52

## 2023-12-26 RX ADMIN — Medication 400 MG: at 22:50

## 2023-12-26 RX ADMIN — METOPROLOL SUCCINATE 25 MG: 25 TABLET, EXTENDED RELEASE ORAL at 09:20

## 2023-12-26 NOTE — ACP (ADVANCE CARE PLANNING)
Advance Care Planning     General Advance Care Planning (ACP) Conversation    Date of Conversation: 12/24/2023  Conducted with: Patient with Decision Making Capacity    Healthcare Decision Maker:  No healthcare decision makers have been documented. Click here to complete [address] including selection of the Healthcare Decision Maker Relationship (ie \"Primary\")   Today we documented Decision Maker(s) consistent with Legal Next of Kin hierarchy.     Content/Action Overview:  DECLINED ACP Conversation - will revisit periodically  Reviewed DNR/DNI and patient confirms DNR - CC        Length of Voluntary ACP Conversation in minutes:  <16 minutes (Non-Billable)    [email or web address]

## 2023-12-26 NOTE — PLAN OF CARE
Problem: Discharge Planning  Goal: Discharge to home or other facility with appropriate resources  12/26/2023 0139 by Jamaal Guerrero RN  Outcome: Progressing  12/25/2023 1323 by Leia Orozco RN  Outcome: Progressing  Flowsheets (Taken 12/25/2023 0830)  Discharge to home or other facility with appropriate resources: Identify barriers to discharge with patient and caregiver     Problem: Skin/Tissue Integrity  Goal: Absence of new skin breakdown  Description: 1. Monitor for areas of redness and/or skin breakdown  2. Assess vascular access sites hourly  3. Every 4-6 hours minimum:  Change oxygen saturation probe site  4. Every 4-6 hours:  If on nasal continuous positive airway pressure, respiratory therapy assess nares and determine need for appliance change or resting period.   12/26/2023 0139 by Jamaal Guerrero RN  Outcome: Progressing  12/25/2023 1323 by Leia Orozco RN  Outcome: Progressing     Problem: Safety - Adult  Goal: Free from fall injury  12/26/2023 0139 by Jamaal Guerrero RN  Outcome: Progressing  12/25/2023 1323 by Leia Orozco RN  Outcome: Progressing     Problem: Safety - Adult  Goal: Free from fall injury  12/26/2023 0139 by Jamaal Guerrero RN  Outcome: Progressing  12/25/2023 1323 by Leia Orozco RN  Outcome: Progressing

## 2023-12-26 NOTE — PLAN OF CARE
Problem: Discharge Planning  Goal: Discharge to home or other facility with appropriate resources  Recent Flowsheet Documentation  Taken 12/26/2023 0911 by Magnus Johnston RN  Discharge to home or other facility with appropriate resources:   Identify barriers to discharge with patient and caregiver   Arrange for needed discharge resources and transportation as appropriate   Identify discharge learning needs (meds, wound care, etc)   Arrange for interpreters to assist at discharge as needed   Refer to discharge planning if patient needs post-hospital services based on physician order or complex needs related to functional status, cognitive ability or social support system  12/26/2023 0139 by Mario Sultana RN  Outcome: Progressing     Problem: Skin/Tissue Integrity  Goal: Absence of new skin breakdown  12/26/2023 0139 by Mario Sultana RN  Outcome: Progressing     Problem: Safety - Adult  Goal: Free from fall injury  12/26/2023 0139 by Mario Sultana RN  Outcome: Progressing

## 2023-12-26 NOTE — CARE COORDINATION
Case Management Assessment  Initial Evaluation    Date/Time of Evaluation: 12/26/2023 11:55 AM  Assessment Completed by: Janene Strauss    If patient is discharged prior to next notation, then this note serves as note for discharge by case management. Patient Name: Barak Garcia                   YOB: 1930  Diagnosis: Dehydration [E86.0]  Enteritis [K52.9]  Nausea vomiting and diarrhea [R11.2, R19.7]  Chronic kidney disease, unspecified CKD stage [N18.9]                   Date / Time: 12/24/2023  4:26 PM    Patient Admission Status: Inpatient   Readmission Risk (Low < 19, Mod (19-27), High > 27): Readmission Risk Score: 20.9    Current PCP: Home, [place]  PCP verified by CM? Yes ([facility] MD)    Chart Reviewed: Yes      History Provided by: Patient  Patient Orientation: Alert and Oriented    Patient Cognition: Alert    Hospitalization in the last 30 days (Readmission):  No    If yes, Readmission Assessment in CM Navigator will be completed. Advance Directives:      Code Status: DNR-CC   Patient's Primary Decision Maker is: Patient Declined (Legal Next of Kin Remains as Decision Maker)      Discharge Planning:    Patient lives with: Other (Comment) ([facility]) Type of Home: Long-Term Care  Primary Care Giver: Other (Comment) ([facility])  Patient Support Systems include:  Other (Comment), Children ([facility])   Current Financial resources: Medicare  Current community resources: None  Current services prior to admission: None (none)            Current DME:              Type of Home Care services:  None    ADLS  Prior functional level: Assistance with the following:, Bathing, Dressing, Toileting, Cooking, Housework, Shopping, Mobility  Current functional level: Assistance with the following:, Dressing, Toileting, Bathing, Mobility, Shopping, Housework, Cooking    PT AM-PAC:   /24  OT AM-PAC:   /24    Family can provide assistance at DC: No  Would you like Case Management to discuss the discharge plan

## 2023-12-27 ENCOUNTER — APPOINTMENT (OUTPATIENT)
Dept: VASCULAR LAB | Age: 88
DRG: 392 | End: 2023-12-27
Payer: MEDICARE

## 2023-12-27 ENCOUNTER — ANESTHESIA (OUTPATIENT)
Dept: ENDOSCOPY | Age: 88
DRG: 392 | End: 2023-12-27
Payer: MEDICARE

## 2023-12-27 ENCOUNTER — APPOINTMENT (OUTPATIENT)
Dept: GENERAL RADIOLOGY | Age: 88
DRG: 392 | End: 2023-12-27
Payer: MEDICARE

## 2023-12-27 PROBLEM — M79.604 PAIN OF RIGHT LOWER EXTREMITY: Status: ACTIVE | Noted: 2023-12-27

## 2023-12-27 LAB
ALBUMIN SERPL-MCNC: 3 G/DL (ref 3.4–5)
ALBUMIN/GLOB SERPL: 0.9 {RATIO} (ref 1.1–2.2)
ALP SERPL-CCNC: 58 U/L (ref 40–129)
ALT SERPL-CCNC: 15 U/L (ref 10–40)
ANION GAP SERPL CALCULATED.3IONS-SCNC: 10 MMOL/L (ref 3–16)
ANION GAP SERPL CALCULATED.3IONS-SCNC: 12 MMOL/L (ref 3–16)
AST SERPL-CCNC: 24 U/L (ref 15–37)
BASOPHILS # BLD: 0.1 K/UL (ref 0–0.2)
BASOPHILS NFR BLD: 1 %
BILIRUB SERPL-MCNC: 0.5 MG/DL (ref 0–1)
BUN SERPL-MCNC: 18 MG/DL (ref 7–20)
BUN SERPL-MCNC: 22 MG/DL (ref 7–20)
CALCIUM SERPL-MCNC: 8.4 MG/DL (ref 8.3–10.6)
CALCIUM SERPL-MCNC: 8.5 MG/DL (ref 8.3–10.6)
CHLORIDE SERPL-SCNC: 107 MMOL/L (ref 99–110)
CHLORIDE SERPL-SCNC: 107 MMOL/L (ref 99–110)
CO2 SERPL-SCNC: 25 MMOL/L (ref 21–32)
CO2 SERPL-SCNC: 26 MMOL/L (ref 21–32)
CREAT SERPL-MCNC: 0.8 MG/DL (ref 0.6–1.2)
CREAT SERPL-MCNC: 0.8 MG/DL (ref 0.6–1.2)
DEPRECATED RDW RBC AUTO: 13.8 % (ref 12.4–15.4)
EOSINOPHIL # BLD: 0.3 K/UL (ref 0–0.6)
EOSINOPHIL NFR BLD: 5.3 %
GFR SERPLBLD CREATININE-BSD FMLA CKD-EPI: >60 ML/MIN/{1.73_M2}
GFR SERPLBLD CREATININE-BSD FMLA CKD-EPI: >60 ML/MIN/{1.73_M2}
GLUCOSE BLD-MCNC: 149 MG/DL (ref 70–99)
GLUCOSE BLD-MCNC: 168 MG/DL (ref 70–99)
GLUCOSE BLD-MCNC: 172 MG/DL (ref 70–99)
GLUCOSE SERPL-MCNC: 169 MG/DL (ref 70–99)
GLUCOSE SERPL-MCNC: 170 MG/DL (ref 70–99)
HCT VFR BLD AUTO: 31.7 % (ref 36–48)
HGB BLD-MCNC: 11.1 G/DL (ref 12–16)
LYMPHOCYTES # BLD: 1.4 K/UL (ref 1–5.1)
LYMPHOCYTES NFR BLD: 24.7 %
MAGNESIUM SERPL-MCNC: 1.8 MG/DL (ref 1.8–2.4)
MAGNESIUM SERPL-MCNC: 1.9 MG/DL (ref 1.8–2.4)
MCH RBC QN AUTO: 33.1 PG (ref 26–34)
MCHC RBC AUTO-ENTMCNC: 34.9 G/DL (ref 31–36)
MCV RBC AUTO: 94.7 FL (ref 80–100)
MONOCYTES # BLD: 0.4 K/UL (ref 0–1.3)
MONOCYTES NFR BLD: 7.6 %
NEUTROPHILS # BLD: 3.4 K/UL (ref 1.7–7.7)
NEUTROPHILS NFR BLD: 61.4 %
PERFORMED ON: ABNORMAL
PHOSPHATE SERPL-MCNC: 1.1 MG/DL (ref 2.5–4.9)
PLATELET # BLD AUTO: 84 K/UL (ref 135–450)
PMV BLD AUTO: 11.7 FL (ref 5–10.5)
POTASSIUM SERPL-SCNC: 3.1 MMOL/L (ref 3.5–5.1)
POTASSIUM SERPL-SCNC: 3.2 MMOL/L (ref 3.5–5.1)
POTASSIUM SERPL-SCNC: 3.2 MMOL/L (ref 3.5–5.1)
PROT SERPL-MCNC: 6.3 G/DL (ref 6.4–8.2)
RBC # BLD AUTO: 3.34 M/UL (ref 4–5.2)
SODIUM SERPL-SCNC: 143 MMOL/L (ref 136–145)
SODIUM SERPL-SCNC: 144 MMOL/L (ref 136–145)
WBC # BLD AUTO: 5.5 K/UL (ref 4–11)

## 2023-12-27 PROCEDURE — 2580000003 HC RX 258: Performed by: INTERNAL MEDICINE

## 2023-12-27 PROCEDURE — 2580000003 HC RX 258: Performed by: ANESTHESIOLOGY

## 2023-12-27 PROCEDURE — 2709999900 HC NON-CHARGEABLE SUPPLY: Performed by: INTERNAL MEDICINE

## 2023-12-27 PROCEDURE — 6360000002 HC RX W HCPCS: Performed by: NURSE ANESTHETIST, CERTIFIED REGISTERED

## 2023-12-27 PROCEDURE — 80048 BASIC METABOLIC PNL TOTAL CA: CPT

## 2023-12-27 PROCEDURE — 0DB48ZX EXCISION OF ESOPHAGOGASTRIC JUNCTION, VIA NATURAL OR ARTIFICIAL OPENING ENDOSCOPIC, DIAGNOSTIC: ICD-10-PCS | Performed by: INTERNAL MEDICINE

## 2023-12-27 PROCEDURE — 99232 SBSQ HOSP IP/OBS MODERATE 35: CPT | Performed by: INTERNAL MEDICINE

## 2023-12-27 PROCEDURE — 73590 X-RAY EXAM OF LOWER LEG: CPT

## 2023-12-27 PROCEDURE — 6360000002 HC RX W HCPCS: Performed by: INTERNAL MEDICINE

## 2023-12-27 PROCEDURE — 2500000003 HC RX 250 WO HCPCS: Performed by: ANESTHESIOLOGY

## 2023-12-27 PROCEDURE — 3700000001 HC ADD 15 MINUTES (ANESTHESIA): Performed by: INTERNAL MEDICINE

## 2023-12-27 PROCEDURE — 3609012400 HC EGD TRANSORAL BIOPSY SINGLE/MULTIPLE: Performed by: INTERNAL MEDICINE

## 2023-12-27 PROCEDURE — 88305 TISSUE EXAM BY PATHOLOGIST: CPT

## 2023-12-27 PROCEDURE — 85025 COMPLETE CBC W/AUTO DIFF WBC: CPT

## 2023-12-27 PROCEDURE — 93971 EXTREMITY STUDY: CPT

## 2023-12-27 PROCEDURE — 6370000000 HC RX 637 (ALT 250 FOR IP): Performed by: INTERNAL MEDICINE

## 2023-12-27 PROCEDURE — 88342 IMHCHEM/IMCYTCHM 1ST ANTB: CPT

## 2023-12-27 PROCEDURE — 83735 ASSAY OF MAGNESIUM: CPT

## 2023-12-27 PROCEDURE — 3609010600 HC COLONOSCOPY POLYPECTOMY SNARE/COLD BIOPSY: Performed by: INTERNAL MEDICINE

## 2023-12-27 PROCEDURE — C9113 INJ PANTOPRAZOLE SODIUM, VIA: HCPCS | Performed by: INTERNAL MEDICINE

## 2023-12-27 PROCEDURE — 3700000000 HC ANESTHESIA ATTENDED CARE: Performed by: INTERNAL MEDICINE

## 2023-12-27 PROCEDURE — 36415 COLL VENOUS BLD VENIPUNCTURE: CPT

## 2023-12-27 PROCEDURE — 1200000000 HC SEMI PRIVATE

## 2023-12-27 PROCEDURE — 7100000011 HC PHASE II RECOVERY - ADDTL 15 MIN: Performed by: INTERNAL MEDICINE

## 2023-12-27 PROCEDURE — 0DB98ZX EXCISION OF DUODENUM, VIA NATURAL OR ARTIFICIAL OPENING ENDOSCOPIC, DIAGNOSTIC: ICD-10-PCS | Performed by: INTERNAL MEDICINE

## 2023-12-27 PROCEDURE — 2500000003 HC RX 250 WO HCPCS: Performed by: NURSE ANESTHETIST, CERTIFIED REGISTERED

## 2023-12-27 PROCEDURE — 7100000010 HC PHASE II RECOVERY - FIRST 15 MIN: Performed by: INTERNAL MEDICINE

## 2023-12-27 PROCEDURE — 0DB68ZX EXCISION OF STOMACH, VIA NATURAL OR ARTIFICIAL OPENING ENDOSCOPIC, DIAGNOSTIC: ICD-10-PCS | Performed by: INTERNAL MEDICINE

## 2023-12-27 PROCEDURE — 0DBM8ZX EXCISION OF DESCENDING COLON, VIA NATURAL OR ARTIFICIAL OPENING ENDOSCOPIC, DIAGNOSTIC: ICD-10-PCS | Performed by: INTERNAL MEDICINE

## 2023-12-27 PROCEDURE — A4216 STERILE WATER/SALINE, 10 ML: HCPCS | Performed by: ANESTHESIOLOGY

## 2023-12-27 RX ORDER — ONDANSETRON 2 MG/ML
4 INJECTION INTRAMUSCULAR; INTRAVENOUS
Status: DISCONTINUED | OUTPATIENT
Start: 2023-12-27 | End: 2023-12-28

## 2023-12-27 RX ORDER — SODIUM CHLORIDE 0.9 % (FLUSH) 0.9 %
5-40 SYRINGE (ML) INJECTION PRN
Status: DISCONTINUED | OUTPATIENT
Start: 2023-12-27 | End: 2023-12-28 | Stop reason: HOSPADM

## 2023-12-27 RX ORDER — SODIUM CHLORIDE 9 MG/ML
INJECTION, SOLUTION INTRAVENOUS PRN
Status: DISCONTINUED | OUTPATIENT
Start: 2023-12-27 | End: 2023-12-28 | Stop reason: HOSPADM

## 2023-12-27 RX ORDER — PROPOFOL 10 MG/ML
INJECTION, EMULSION INTRAVENOUS PRN
Status: DISCONTINUED | OUTPATIENT
Start: 2023-12-27 | End: 2023-12-27

## 2023-12-27 RX ORDER — MEPERIDINE HYDROCHLORIDE 25 MG/ML
12.5 INJECTION INTRAMUSCULAR; INTRAVENOUS; SUBCUTANEOUS EVERY 5 MIN PRN
Status: DISCONTINUED | OUTPATIENT
Start: 2023-12-27 | End: 2023-12-28

## 2023-12-27 RX ORDER — POTASSIUM CHLORIDE 7.45 MG/ML
10 INJECTION INTRAVENOUS PRN
Status: DISCONTINUED | OUTPATIENT
Start: 2023-12-27 | End: 2023-12-28 | Stop reason: HOSPADM

## 2023-12-27 RX ORDER — OXYCODONE HYDROCHLORIDE 5 MG/1
10 TABLET ORAL PRN
Status: DISCONTINUED | OUTPATIENT
Start: 2023-12-27 | End: 2023-12-27

## 2023-12-27 RX ORDER — PANTOPRAZOLE SODIUM 40 MG/1
40 TABLET, DELAYED RELEASE ORAL
Status: DISCONTINUED | OUTPATIENT
Start: 2023-12-28 | End: 2023-12-28 | Stop reason: HOSPADM

## 2023-12-27 RX ORDER — MAGNESIUM SULFATE IN WATER 40 MG/ML
2000 INJECTION, SOLUTION INTRAVENOUS PRN
Status: DISCONTINUED | OUTPATIENT
Start: 2023-12-27 | End: 2023-12-28 | Stop reason: HOSPADM

## 2023-12-27 RX ORDER — LIDOCAINE HYDROCHLORIDE 20 MG/ML
INJECTION, SOLUTION INFILTRATION; PERINEURAL PRN
Status: DISCONTINUED | OUTPATIENT
Start: 2023-12-27 | End: 2023-12-27 | Stop reason: SDUPTHER

## 2023-12-27 RX ORDER — SODIUM CHLORIDE 0.9 % (FLUSH) 0.9 %
5-40 SYRINGE (ML) INJECTION EVERY 12 HOURS SCHEDULED
Status: DISCONTINUED | OUTPATIENT
Start: 2023-12-27 | End: 2023-12-28 | Stop reason: HOSPADM

## 2023-12-27 RX ORDER — OXYCODONE HYDROCHLORIDE 5 MG/1
5 TABLET ORAL PRN
Status: DISCONTINUED | OUTPATIENT
Start: 2023-12-27 | End: 2023-12-27

## 2023-12-27 RX ORDER — PROPOFOL 10 MG/ML
INJECTION, EMULSION INTRAVENOUS PRN
Status: DISCONTINUED | OUTPATIENT
Start: 2023-12-27 | End: 2023-12-27 | Stop reason: SDUPTHER

## 2023-12-27 RX ORDER — POTASSIUM CHLORIDE 20 MEQ/1
40 TABLET, EXTENDED RELEASE ORAL PRN
Status: DISCONTINUED | OUTPATIENT
Start: 2023-12-27 | End: 2023-12-28 | Stop reason: HOSPADM

## 2023-12-27 RX ORDER — SODIUM CHLORIDE, SODIUM LACTATE, POTASSIUM CHLORIDE, CALCIUM CHLORIDE 600; 310; 30; 20 MG/100ML; MG/100ML; MG/100ML; MG/100ML
INJECTION, SOLUTION INTRAVENOUS CONTINUOUS
Status: DISCONTINUED | OUTPATIENT
Start: 2023-12-27 | End: 2023-12-27

## 2023-12-27 RX ADMIN — SODIUM CHLORIDE, POTASSIUM CHLORIDE, SODIUM LACTATE AND CALCIUM CHLORIDE: 600; 310; 30; 20 INJECTION, SOLUTION INTRAVENOUS at 04:39

## 2023-12-27 RX ADMIN — Medication 10 ML: at 23:45

## 2023-12-27 RX ADMIN — METRONIDAZOLE 500 MG: 500 INJECTION, SOLUTION INTRAVENOUS at 13:41

## 2023-12-27 RX ADMIN — METOPROLOL SUCCINATE 25 MG: 25 TABLET, EXTENDED RELEASE ORAL at 09:58

## 2023-12-27 RX ADMIN — SODIUM CHLORIDE, POTASSIUM CHLORIDE, SODIUM LACTATE AND CALCIUM CHLORIDE: 600; 310; 30; 20 INJECTION, SOLUTION INTRAVENOUS at 15:23

## 2023-12-27 RX ADMIN — PANTOPRAZOLE SODIUM 40 MG: 40 INJECTION, POWDER, FOR SOLUTION INTRAVENOUS at 09:58

## 2023-12-27 RX ADMIN — ATORVASTATIN CALCIUM 20 MG: 10 TABLET, FILM COATED ORAL at 23:48

## 2023-12-27 RX ADMIN — CEFTRIAXONE 1000 MG: 1 INJECTION, POWDER, FOR SOLUTION INTRAMUSCULAR; INTRAVENOUS at 13:43

## 2023-12-27 RX ADMIN — METRONIDAZOLE 500 MG: 500 INJECTION, SOLUTION INTRAVENOUS at 01:31

## 2023-12-27 RX ADMIN — METRONIDAZOLE 500 MG: 500 INJECTION, SOLUTION INTRAVENOUS at 17:54

## 2023-12-27 RX ADMIN — FAMOTIDINE 20 MG: 10 INJECTION, SOLUTION INTRAVENOUS at 13:14

## 2023-12-27 RX ADMIN — LIDOCAINE HYDROCHLORIDE 40 MG: 20 INJECTION, SOLUTION INFILTRATION; PERINEURAL at 10:53

## 2023-12-27 RX ADMIN — PHENYLEPHRINE HYDROCHLORIDE 50 MCG: 10 INJECTION INTRAVENOUS at 11:05

## 2023-12-27 RX ADMIN — PHENYLEPHRINE HYDROCHLORIDE 50 MCG: 10 INJECTION INTRAVENOUS at 11:09

## 2023-12-27 RX ADMIN — PROPOFOL 120 MG: 10 INJECTION, EMULSION INTRAVENOUS at 10:53

## 2023-12-27 RX ADMIN — HEPARIN SODIUM 5000 UNITS: 5000 INJECTION INTRAVENOUS; SUBCUTANEOUS at 23:48

## 2023-12-27 NOTE — PROCEDURES
EGD and Colonoscopy Procedure  Note            Patient: Colette Feliz  :   CSN:     Procedure: Esophagogastroduodenoscopy with Colonoscopy    Date:  2023     Surgeon:  Juli Farfan MD     Referring Provider:  Dr. Cali Echeverria    Preoperative Diagnosis:  Abdominal pain, change in bowel habits    Postoperative Diagnosis:  Mild esophagitis, mild gastritis, colon polyp removed, diverticuli    Anesthesia:  Propofol    EBL: <50 mL    Indications: This is a 80y.o. year old female who presents today with  Mild esophagitis, mild gastritis, colon polyp removed, diverticuli . Procedure Details:    With the patient in left lateral position the endoscope was passed through the hypopharynx into the esophagus. The scope was advanced all the way to the duodenum. Biopsies were taken to rule out celiac disease. The mucosa in the duodenal bulb, post bulbar region in the descending duodenum appeared normal.  The mucosa in the antrum appeared erythematous, biopsies were taken to rule out Hpylori bacteria. The mucosa in the remaining part of the stomach and retroflexion appeared normal.  The GE junction was at around  32cms. The GE Junction was irregular, biopsies were taken to rule out celeste's esophagitis. The mucosa in the remainder of the esophagus appeared normal.  The scope was withdrawn and the procedure was terminated. Gastric or Duodenal ulcer present: No    Procedure Details:    With the patient in left lateral decubitus position the endoscope was inserted through the anorectal area into the rectum. The scope was then advanced through the length of the colon to the ileum. The ileocecal valve was visualized and cannulated. The quality of preparation was good. Water was insufflated through the biopsy channel to clean out the colon and look at the underlying mucosa. The scope was carefully withdrawn and found to be normal.    Digital rectal examination was performed, no abnormalities noted.

## 2023-12-27 NOTE — CARE COORDINATION
CM update    EGD/C-scope today. Possible DC later today.   Plan: return to LTC @:  Name: [facility]  Address: [address] [place] [address] [place], [place], [address]  Phone: [phone]  Fax:  [phone]

## 2023-12-27 NOTE — PLAN OF CARE
Problem: Skin/Tissue Integrity  Goal: Absence of new skin breakdown  Description: 1. Monitor for areas of redness and/or skin breakdown  2. Assess vascular access sites hourly  3. Every 4-6 hours minimum:  Change oxygen saturation probe site  4. Every 4-6 hours:  If on nasal continuous positive airway pressure, respiratory therapy assess nares and determine need for appliance change or resting period.   12/27/2023 9151 by Kristen Shetty RN  Outcome: Progressing  12/27/2023 0035 by Naomie Macdonald RN  Outcome: Progressing     Problem: Safety - Adult  Goal: Free from fall injury  12/27/2023 5689 by Kristen Shetty RN  Outcome: Progressing  12/27/2023 0035 by Naomie Macdonald RN  Outcome: Progressing     Problem: Chronic Conditions and Co-morbidities  Goal: Patient's chronic conditions and co-morbidity symptoms are monitored and maintained or improved  Outcome: Progressing

## 2023-12-27 NOTE — ANESTHESIA PRE PROCEDURE
2010, S/P RLL PART RESECTION   Cardiovascular:    (+) hypertension:, dysrhythmias: atrial fibrillation, YANES:, hyperlipidemia    (-) pacemaker, past MI, CAD, CABG/stent,  angina and  CHF    ECG reviewed  Rhythm: irregular  Rate: normal           Beta Blocker:  Dose within 24 Hrs         Neuro/Psych:   (+) neuromuscular disease (CTS):depression/anxiety  (HX DEP. )dementia   (-) seizures, TIA and CVA           GI/Hepatic/Renal:   (+) GERD:, renal disease: kidney stones     (-) liver disease, bowel prep and no morbid obesity      ROS comment: ENTERITIS/N/V. Endo/Other:    (+) DiabetesType II DM, using insulin, blood dyscrasia (AC ON HOLD): thrombocytopenia and anticoagulation therapy, arthritis: OA., malignancy/cancer (SKIN). (-) hypothyroidism, hyperthyroidism               Abdominal:       Abdomen: soft. Vascular: negative vascular ROS. Other Findings:             Anesthesia Plan      TIVA     ASA 3     (FULL DNR)  Induction: intravenous. MIPS: Prophylactic antiemetics administered. Anesthetic plan and risks discussed with patient, healthcare power of  and child/children. Plan discussed with CRNA. This pre-anesthesia assessment may be used as a history and physical.    DOS STAFF ADDENDUM:    Pt seen and examined, chart reviewed (including anesthesia, drug and allergy history). No interval changes to history and physical examination. Anesthetic plan, risks, benefits, alternatives, and personnel involved discussed with patient. Patient verbalized an understanding and agrees to proceed.       Jordan Tucker MD  December 27, 2023  10:23 AM  Jordan Tucker MD   12/27/2023 intact

## 2023-12-27 NOTE — H&P
Gastroenterology Note             Pre-operative History and Physical    Patient: Clearance Quintin  : [birth year]  CSN:     History Obtained From:  patient and/or guardian. HISTORY OF PRESENT ILLNESS:    The patient is a 80 y.o. female  here for EGD/colonoscopy. Past Medical History:    Past Medical History:   Diagnosis Date    Arthritis     CAD (coronary artery disease)     Cancer of cheek ([address]) 04/15/2010    COVID-19 20, 11/10/2020    Diabetes mellitus ([facility])     Hyperlipidemia     Hypertension     Osteoporosis 01/15/2019    Retrocalcaneal bursitis 2017    Secondary osteoarthritis of left shoulder due to rotator cuff arthropathy 2019    TB (pulmonary tuberculosis)      Past Surgical History:    Past Surgical History:   Procedure Laterality Date    CARDIAC CATHETERIZATION      x 4 all normal    CHOLECYSTECTOMY, LAPAROSCOPIC N/A 2013    LIVER BIOPSY; EXCISION OF NECROTIC LYMP NODE OF CROQUET; UMBILICAL HERNIA REPAIR    LUNG SURGERY      partial right lower lobectomy    SHOULDER SURGERY      THROAT SURGERY      vocal cord polyp     Medications Prior to Admission:   No current facility-administered medications on file prior to encounter. Current Outpatient Medications on File Prior to Encounter   Medication Sig Dispense Refill    Dextromethorphan-guaiFENesin  MG/5ML SYRP Take 10 mLs by mouth every 4 hours as needed for Cough      hydrocortisone 1 % cream Apply 0.01 g topically every 8 (eight) hours Apply every 8 hours as needed for pruritic rash on forearms.       potassium chloride (KLOR-CON M) 20 MEQ extended release tablet Take 1 tablet by mouth daily for 360 doses 90 tablet 3    insulin glargine (BASAGLAR KWIKPEN) 100 UNIT/ML injection pen Inject 35 Units into the skin daily      sodium phosphate (FLEET) 7-19 GM/118ML Place 1 enema rectally daily as needed      insulin aspart (NOVOLOG) 100 UNIT/ML injection vial Inject 5 Units into the skin 3 times daily (before

## 2023-12-28 VITALS
HEART RATE: 69 BPM | SYSTOLIC BLOOD PRESSURE: 145 MMHG | RESPIRATION RATE: 16 BRPM | HEIGHT: 66 IN | TEMPERATURE: 98.8 F | BODY MASS INDEX: 17.57 KG/M2 | OXYGEN SATURATION: 93 % | DIASTOLIC BLOOD PRESSURE: 66 MMHG | WEIGHT: 109.3 LBS

## 2023-12-28 LAB
GLUCOSE BLD-MCNC: 147 MG/DL (ref 70–99)
PERFORMED ON: ABNORMAL

## 2023-12-28 PROCEDURE — 2580000003 HC RX 258: Performed by: INTERNAL MEDICINE

## 2023-12-28 PROCEDURE — 6370000000 HC RX 637 (ALT 250 FOR IP): Performed by: INTERNAL MEDICINE

## 2023-12-28 PROCEDURE — 6360000002 HC RX W HCPCS: Performed by: INTERNAL MEDICINE

## 2023-12-28 RX ORDER — PANTOPRAZOLE SODIUM 40 MG/1
40 TABLET, DELAYED RELEASE ORAL
Qty: 30 TABLET | Refills: 3 | DISCHARGE
Start: 2023-12-28

## 2023-12-28 RX ORDER — INSULIN GLARGINE 100 [IU]/ML
15 INJECTION, SOLUTION SUBCUTANEOUS DAILY
Qty: 5 ADJUSTABLE DOSE PRE-FILLED PEN SYRINGE | Refills: 3 | Status: SHIPPED
Start: 2023-12-28

## 2023-12-28 RX ADMIN — HEPARIN SODIUM 5000 UNITS: 5000 INJECTION INTRAVENOUS; SUBCUTANEOUS at 09:58

## 2023-12-28 RX ADMIN — CEFTRIAXONE 1000 MG: 1 INJECTION, POWDER, FOR SOLUTION INTRAMUSCULAR; INTRAVENOUS at 10:03

## 2023-12-28 RX ADMIN — METOPROLOL SUCCINATE 25 MG: 25 TABLET, EXTENDED RELEASE ORAL at 09:58

## 2023-12-28 RX ADMIN — METRONIDAZOLE 500 MG: 500 INJECTION, SOLUTION INTRAVENOUS at 00:49

## 2023-12-28 RX ADMIN — PANTOPRAZOLE SODIUM 40 MG: 40 TABLET, DELAYED RELEASE ORAL at 09:59

## 2023-12-28 RX ADMIN — METRONIDAZOLE 500 MG: 500 INJECTION, SOLUTION INTRAVENOUS at 10:04

## 2023-12-28 NOTE — DISCHARGE SUMMARY
Hospital Medicine Discharge Summary    Patient: Kerri Ureña     Gender: female  : 1930   Age: 80 y.o. MRN: [id number]    Admitting Physician: Mohini Rahman MD  Discharge Physician: Puneet Logan DO    Code Status: DNR-CC     Admit Date: 2023   Discharge Date:  2023    Discharge Diagnoses: Active Hospital Problems    Diagnosis Date Noted    Chronic a-fib ([address]) [I48.20] 2022     Priority: Medium    Pain of right lower extremity [M79.604] 2023    Dehydration [E86.0] 2023    Hypokalemia [E87.6] 2023    Nausea vomiting and diarrhea [R11.2, R19.7] 2023    JASBIR (acute kidney injury) ([address]) [N17.9] 2023    Enteritis [K52.9] 2023    Type 2 diabetes mellitus without complication, without long-term current use of insulin ([address]) [E11.9] 2019    Dementia due to Alzheimer's disease ([address]) [G30.9, F02.80] 2019    HTN (hypertension) [I10] 10/11/2016       Condition at Discharge: [facility] Course:     # Patient presents to the emergency with nausea vomiting diarrhea. CT scan showed early ileus and possible enteritis. She continues to have diarrhea. She had 1 episode of emesis this morning. Emesis has been ongoing for the last 4 days per daughter. She was started on empiric Rocephin and Flagyl.    - stool studies negative  - GI consultation  - Advance diet as tolerated  - Mild esophagitis, mild gastritis, colon polyp removed. No significant abnormalities seen on colonoscopy other then one small polyp removed. - dairy free diet, imodium and peptobismo as needed for diarrhea. - diarrhea resolving     # Underlying dementia at baseline. Patient answers appropriately and is conversational     # Atrial fibrillation. In sinus rhythm. On Eliquis 2.5 mg BID - however per pharmacy med/rec she is no longer taking this. # Depression. On Lexapro. # Diabetes mellitus type 2, insulin dependent     # JASBIR due to dehydration.   Started

## 2023-12-28 NOTE — PLAN OF CARE
Problem: Discharge Planning  Goal: Discharge to home or other facility with appropriate resources  12/28/2023 0041 by Christiana Domínguez RN  Outcome: Progressing  12/27/2023 6595 by Joselin Luna RN  Outcome: Progressing     Problem: Skin/Tissue Integrity  Goal: Absence of new skin breakdown  Description: 1. Monitor for areas of redness and/or skin breakdown  2. Assess vascular access sites hourly  3. Every 4-6 hours minimum:  Change oxygen saturation probe site  4. Every 4-6 hours:  If on nasal continuous positive airway pressure, respiratory therapy assess nares and determine need for appliance change or resting period.   12/28/2023 0041 by Christiana Domínguez RN  Outcome: Progressing  12/27/2023 3733 by Joselin Luna RN  Outcome: Progressing     Problem: Safety - Adult  Goal: Free from fall injury  12/28/2023 0041 by Christiana Domínguez RN  Outcome: Progressing  12/27/2023 6778 by Joselin Luna RN  Outcome: Progressing     Problem: Chronic Conditions and Co-morbidities  Goal: Patient's chronic conditions and co-morbidity symptoms are monitored and maintained or improved  12/28/2023 0041 by Christiana Domínguez RN  Outcome: Progressing  12/27/2023 1409 by Joselin Luna RN  Outcome: Progressing     Problem: Nutrition Deficit:  Goal: Optimize nutritional status  12/28/2023 0041 by Christiana Domínguez RN  Outcome: Progressing  12/27/2023 4299 by Joselin Luna RN  Outcome: Progressing     Problem: ABCDS Injury Assessment  Goal: Absence of physical injury  Outcome: Progressing

## 2023-12-28 NOTE — DISCHARGE INSTRUCTIONS
Your information:  Name: Khanh Ribeiro  : [id number]    Your instructions: Follow up with your PCP    What to do after you leave the hospital:    Recommended diet: regular diet    Recommended activity: activity as tolerated        The following personal items were collected during your admission and were returned to you:    Belongings  Dental Appliances: Uppers, Lowers, At bedside  Vision - Corrective Lenses: None  Hearing Aid: None  Clothing: Shirt, Socks, Footwear, Pants  Jewelry: None  Electronic Devices: None  Weapons (Notify Protective Services/Security): None  Home Medications: None  Valuables Given To: Patient  Provide Name(s) of Who Valuable(s) Were Given To: patient    Information obtained by:  By signing below, I understand that if any problems occur once I leave the hospital I am to contact MD.  I understand and acknowledge receipt of the instructions indicated above.

## 2023-12-28 NOTE — CARE COORDINATION
DISCHARGE ORDER  Date/Time 2023 10:38 AM  Completed by: Elma Shanks RN, Case Management    Patient Name: Faviola Jose    : 1930      Admit order Date and Status: IP 2023  Noted discharge order. (verify MD's last order for status of admission/Traditional Medicare 3 [place] Inpatient qualifying stay required for SNF)    Confirmed discharge plan with: Daughter Ethan Harvey                   Discharge to Facility:    Name: [facility]  Address: [address] [place] [address] [facility] [address]  Phone: [phone]  Fax:  [phone]     Facility phone number for staff giving report: see above   Pre-certification completed: [address] Notification (HENS) completed: NA   Discharge orders and Continuity of Care faxed to facility:  [facility]      Transportation:               Medical Transport explained with choice list offered to pt/family. Choice: Per Round Trip (no preference)  [facility]      Pt/family/Nursing/Facility aware of  time:   Pt/daughter Yuliana Hernandez (VGT)  [facility] CHARLEY)  Ambulance form completed:  YES     Date Last IMM Given: e-mailed to daughter (see note)    Comments:    Pt is being d/c'd to [facility] ([address]) today. Pt's O2 sats are 93% on RA. Discharge timeout done with [facility]. All discharge needs and concerns addressed. Discharging nurse to complete KAREN, reconcile AVS, and place final copy with patient's discharge packet. Discharging RN to ensure that written prescriptions for  Level II medications are sent with patient to the facility as per protocol.

## 2023-12-28 NOTE — DISCHARGE INSTR - COC
Continuity of Care Form    Patient Name: Mag Dobbs   :  1930  MRN:  [id number]    Admit date:  2023  Discharge date:  2023    Code Status Order: DNR-CC   Advance Directives:     Admitting Physician:  Hugh Thakkar MD  PCP: [place]    Discharging Nurse: MIRA Critical access hospital Unit/Room#: 0226/0226-01  Discharging Unit Phone Number: [phone]    Emergency Contact:   Extended Emergency Contact Information  Primary Emergency Contact: [address] Phone: [phone]  Mobile Phone: [phone]  Relation: Child  Secondary Emergency Contact: August Bro  Address: [address] [facility], [address] [place] [address]  Home Phone: [phone]  Mobile Phone: [phone]  Relation: Child    Past Surgical History:  Past Surgical History:   Procedure Laterality Date    CARDIAC CATHETERIZATION      x 4 all normal    CHOLECYSTECTOMY, LAPAROSCOPIC N/A 2013    LIVER BIOPSY; EXCISION OF NECROTIC LYMP NODE OF CROQUET; UMBILICAL HERNIA REPAIR    COLONOSCOPY N/A 2023    COLONOSCOPY POLYPECTOMY SNARE/COLD BIOPSY performed by Ramona Salgado MD at [address]      partial right lower lobectomy    SHOULDER SURGERY      THROAT SURGERY      vocal cord polyp    UPPER GASTROINTESTINAL ENDOSCOPY N/A 2023    EGD Biopsy performed by Ramona Salgado MD at [address] [place] [address]       Immunization History:   Immunization History   Administered Date(s) Administered    COVID-19, PFIZER PURPLE top, DILUTE for use, (age 15 y+), 30mcg/0.3mL 2021, 2021, 2021       Active Problems:  Patient Active Problem List   Diagnosis Code    Contusion of shoulder region S40.019A    Incomplete tear of left rotator cuff M75.112    Hip strain, right, initial encounter S76.011A    Contusion of hip S70.00XA    Closed fracture of head of radius S52.123A    Contusion of elbow S50.00XA    Carpal tunnel syndrome G56.00    Enthesopathy of hip

## 2023-12-28 NOTE — CARE COORDINATION
12/28/23 1031   IMM Letter   IMM Letter given to Patient/Family/Significant other/Guardian/POA/by: Jose Graham RN   IMM Letter date given: 12/28/23   IMM Letter time given: 56      CM delivered 2nd IMM delivered within 4 hours of DC, (Pt has advanced dementia) verbal explanation of patient rights to daughter Guero Guerrero [facility]). POA voiced understanding of discharge MCR rights and is agreeable to discharge back to nursing facility. Copy sent top daughter/POA by secure e-mail:  [email or web address]

## 2023-12-28 NOTE — PLAN OF CARE
Problem: Discharge Planning  Goal: Discharge to home or other facility with appropriate resources  12/28/2023 3774 by Kristen Shetty RN  Outcome: Progressing  12/28/2023 0041 by Bruno Victor RN  Outcome: Progressing     Problem: Skin/Tissue Integrity  Goal: Absence of new skin breakdown  Description: 1. Monitor for areas of redness and/or skin breakdown  2. Assess vascular access sites hourly  3. Every 4-6 hours minimum:  Change oxygen saturation probe site  4. Every 4-6 hours:  If on nasal continuous positive airway pressure, respiratory therapy assess nares and determine need for appliance change or resting period.   12/28/2023 5098 by Kristen Shetty RN  Outcome: Progressing  12/28/2023 0041 by Bruno Victor RN  Outcome: Progressing     Problem: Safety - Adult  Goal: Free from fall injury  12/28/2023 0021 by Kristen Shetty RN  Outcome: Progressing  12/28/2023 0041 by Bruno Victor RN  Outcome: Progressing

## 2024-01-24 ENCOUNTER — HOSPITAL ENCOUNTER (EMERGENCY)
Age: 89
Discharge: HOME | End: 2024-01-24
Attending: EMERGENCY MEDICINE
Payer: MEDICARE

## 2024-01-24 ENCOUNTER — APPOINTMENT (OUTPATIENT)
Dept: GENERAL RADIOLOGY | Age: 89
End: 2024-01-24
Payer: MEDICARE

## 2024-01-24 ENCOUNTER — APPOINTMENT (OUTPATIENT)
Dept: CT IMAGING | Age: 89
End: 2024-01-24
Payer: MEDICARE

## 2024-01-24 VITALS
BODY MASS INDEX: 18.18 KG/M2 | DIASTOLIC BLOOD PRESSURE: 68 MMHG | TEMPERATURE: 100 F | SYSTOLIC BLOOD PRESSURE: 112 MMHG | HEIGHT: 65 IN | RESPIRATION RATE: 20 BRPM | OXYGEN SATURATION: 97 % | HEART RATE: 70 BPM | WEIGHT: 109.1 LBS

## 2024-01-24 DIAGNOSIS — L03.311 CELLULITIS OF ABDOMINAL WALL: ICD-10-CM

## 2024-01-24 DIAGNOSIS — J10.1 INFLUENZA A: Primary | ICD-10-CM

## 2024-01-24 DIAGNOSIS — N39.0 URINARY TRACT INFECTION IN FEMALE: ICD-10-CM

## 2024-01-24 LAB
ALBUMIN SERPL-MCNC: 3.9 G/DL (ref 3.4–5)
ALBUMIN/GLOB SERPL: 1 {RATIO} (ref 1.1–2.2)
ALP SERPL-CCNC: 61 U/L (ref 40–129)
ALT SERPL-CCNC: 81 U/L (ref 10–40)
ANION GAP SERPL CALCULATED.3IONS-SCNC: 14 MMOL/L (ref 3–16)
AST SERPL-CCNC: 160 U/L (ref 15–37)
BACTERIA URNS QL MICRO: ABNORMAL /HPF
BASOPHILS # BLD: 0.1 K/UL (ref 0–0.2)
BASOPHILS NFR BLD: 1.1 %
BILIRUB SERPL-MCNC: 0.5 MG/DL (ref 0–1)
BILIRUB UR QL STRIP.AUTO: NEGATIVE
BUN SERPL-MCNC: 23 MG/DL (ref 7–20)
CALCIUM SERPL-MCNC: 9.2 MG/DL (ref 8.3–10.6)
CHLORIDE SERPL-SCNC: 104 MMOL/L (ref 99–110)
CLARITY UR: CLEAR
CO2 SERPL-SCNC: 16 MMOL/L (ref 21–32)
COLOR UR: YELLOW
CREAT SERPL-MCNC: 0.8 MG/DL (ref 0.6–1.2)
DEPRECATED RDW RBC AUTO: 13.2 % (ref 12.4–15.4)
EKG ATRIAL RATE: 95 BPM
EKG DIAGNOSIS: NORMAL
EKG P AXIS: 85 DEGREES
EKG P-R INTERVAL: 202 MS
EKG Q-T INTERVAL: 364 MS
EKG QRS DURATION: 118 MS
EKG QTC CALCULATION (BAZETT): 457 MS
EKG R AXIS: -27 DEGREES
EKG T AXIS: 136 DEGREES
EKG VENTRICULAR RATE: 95 BPM
EOSINOPHIL # BLD: 0 K/UL (ref 0–0.6)
EOSINOPHIL NFR BLD: 0.4 %
EPI CELLS #/AREA URNS HPF: ABNORMAL /HPF (ref 0–5)
FLUAV RNA UPPER RESP QL NAA+PROBE: POSITIVE
FLUBV AG NPH QL: NEGATIVE
GFR SERPLBLD CREATININE-BSD FMLA CKD-EPI: >60 ML/MIN/{1.73_M2}
GLUCOSE SERPL-MCNC: 158 MG/DL (ref 70–99)
GLUCOSE UR STRIP.AUTO-MCNC: NEGATIVE MG/DL
HCT VFR BLD AUTO: 34.3 % (ref 36–48)
HGB BLD-MCNC: 11.6 G/DL (ref 12–16)
HGB UR QL STRIP.AUTO: ABNORMAL
INR PPP: 1.15 (ref 0.84–1.16)
KETONES UR STRIP.AUTO-MCNC: NEGATIVE MG/DL
LACTATE BLDV-SCNC: 1.8 MMOL/L (ref 0.4–1.9)
LEUKOCYTE ESTERASE UR QL STRIP.AUTO: ABNORMAL
LYMPHOCYTES # BLD: 1.4 K/UL (ref 1–5.1)
LYMPHOCYTES NFR BLD: 17.8 %
MCH RBC QN AUTO: 32.8 PG (ref 26–34)
MCHC RBC AUTO-ENTMCNC: 33.9 G/DL (ref 31–36)
MCV RBC AUTO: 96.8 FL (ref 80–100)
MONOCYTES # BLD: 0.5 K/UL (ref 0–1.3)
MONOCYTES NFR BLD: 6.3 %
NEUTROPHILS # BLD: 6 K/UL (ref 1.7–7.7)
NEUTROPHILS NFR BLD: 74.4 %
NITRITE UR QL STRIP.AUTO: POSITIVE
PH UR STRIP.AUTO: 5.5 [PH] (ref 5–8)
PLATELET # BLD AUTO: 99 K/UL (ref 135–450)
PMV BLD AUTO: 10.2 FL (ref 5–10.5)
POTASSIUM SERPL-SCNC: 4.2 MMOL/L (ref 3.5–5.1)
PROT SERPL-MCNC: 7.9 G/DL (ref 6.4–8.2)
PROT UR STRIP.AUTO-MCNC: NEGATIVE MG/DL
PROTHROMBIN TIME: 14.7 SEC (ref 11.5–14.8)
RBC # BLD AUTO: 3.54 M/UL (ref 4–5.2)
RBC #/AREA URNS HPF: ABNORMAL /HPF (ref 0–4)
SARS-COV-2 RDRP RESP QL NAA+PROBE: NOT DETECTED
SODIUM SERPL-SCNC: 134 MMOL/L (ref 136–145)
SP GR UR STRIP.AUTO: 1.02 (ref 1–1.03)
UA COMPLETE W REFLEX CULTURE PNL UR: YES
UA DIPSTICK W REFLEX MICRO PNL UR: YES
URN SPEC COLLECT METH UR: ABNORMAL
UROBILINOGEN UR STRIP-ACNC: 0.2 E.U./DL
WBC # BLD AUTO: 8 K/UL (ref 4–11)
WBC #/AREA URNS HPF: ABNORMAL /HPF (ref 0–5)

## 2024-01-24 PROCEDURE — 6360000004 HC RX CONTRAST MEDICATION: Performed by: EMERGENCY MEDICINE

## 2024-01-24 PROCEDURE — 87077 CULTURE AEROBIC IDENTIFY: CPT

## 2024-01-24 PROCEDURE — 96365 THER/PROPH/DIAG IV INF INIT: CPT

## 2024-01-24 PROCEDURE — 93005 ELECTROCARDIOGRAM TRACING: CPT | Performed by: EMERGENCY MEDICINE

## 2024-01-24 PROCEDURE — 87040 BLOOD CULTURE FOR BACTERIA: CPT

## 2024-01-24 PROCEDURE — 81001 URINALYSIS AUTO W/SCOPE: CPT

## 2024-01-24 PROCEDURE — 80053 COMPREHEN METABOLIC PANEL: CPT

## 2024-01-24 PROCEDURE — 6370000000 HC RX 637 (ALT 250 FOR IP): Performed by: EMERGENCY MEDICINE

## 2024-01-24 PROCEDURE — 83605 ASSAY OF LACTIC ACID: CPT

## 2024-01-24 PROCEDURE — 36415 COLL VENOUS BLD VENIPUNCTURE: CPT

## 2024-01-24 PROCEDURE — 87804 INFLUENZA ASSAY W/OPTIC: CPT

## 2024-01-24 PROCEDURE — 87086 URINE CULTURE/COLONY COUNT: CPT

## 2024-01-24 PROCEDURE — 96375 TX/PRO/DX INJ NEW DRUG ADDON: CPT

## 2024-01-24 PROCEDURE — 85610 PROTHROMBIN TIME: CPT

## 2024-01-24 PROCEDURE — 2580000003 HC RX 258: Performed by: EMERGENCY MEDICINE

## 2024-01-24 PROCEDURE — 87635 SARS-COV-2 COVID-19 AMP PRB: CPT

## 2024-01-24 PROCEDURE — 87186 SC STD MICRODIL/AGAR DIL: CPT

## 2024-01-24 PROCEDURE — 85025 COMPLETE CBC W/AUTO DIFF WBC: CPT

## 2024-01-24 PROCEDURE — 99285 EMERGENCY DEPT VISIT HI MDM: CPT

## 2024-01-24 PROCEDURE — 71045 X-RAY EXAM CHEST 1 VIEW: CPT

## 2024-01-24 PROCEDURE — 93010 ELECTROCARDIOGRAM REPORT: CPT | Performed by: STUDENT IN AN ORGANIZED HEALTH CARE EDUCATION/TRAINING PROGRAM

## 2024-01-24 PROCEDURE — 74177 CT ABD & PELVIS W/CONTRAST: CPT

## 2024-01-24 PROCEDURE — 96361 HYDRATE IV INFUSION ADD-ON: CPT

## 2024-01-24 PROCEDURE — 6360000002 HC RX W HCPCS: Performed by: EMERGENCY MEDICINE

## 2024-01-24 RX ORDER — CEPHALEXIN 500 MG/1
500 CAPSULE ORAL 3 TIMES DAILY
Qty: 30 CAPSULE | Refills: 0 | Status: SHIPPED | OUTPATIENT
Start: 2024-01-24 | End: 2024-01-24

## 2024-01-24 RX ORDER — SULFAMETHOXAZOLE AND TRIMETHOPRIM 800; 160 MG/1; MG/1
1 TABLET ORAL 2 TIMES DAILY
Qty: 20 TABLET | Refills: 0 | Status: SHIPPED | OUTPATIENT
Start: 2024-01-24 | End: 2024-02-03

## 2024-01-24 RX ORDER — KETOROLAC TROMETHAMINE 15 MG/ML
15 INJECTION, SOLUTION INTRAMUSCULAR; INTRAVENOUS ONCE
Status: COMPLETED | OUTPATIENT
Start: 2024-01-24 | End: 2024-01-24

## 2024-01-24 RX ORDER — CEPHALEXIN 500 MG/1
500 CAPSULE ORAL 3 TIMES DAILY
Qty: 30 CAPSULE | Refills: 0 | Status: SHIPPED | OUTPATIENT
Start: 2024-01-24 | End: 2024-02-03

## 2024-01-24 RX ORDER — SULFAMETHOXAZOLE AND TRIMETHOPRIM 800; 160 MG/1; MG/1
1 TABLET ORAL ONCE
Status: COMPLETED | OUTPATIENT
Start: 2024-01-24 | End: 2024-01-24

## 2024-01-24 RX ORDER — SULFAMETHOXAZOLE AND TRIMETHOPRIM 800; 160 MG/1; MG/1
1 TABLET ORAL 2 TIMES DAILY
Qty: 20 TABLET | Refills: 0 | Status: SHIPPED | OUTPATIENT
Start: 2024-01-24 | End: 2024-01-24

## 2024-01-24 RX ORDER — ACETAMINOPHEN 325 MG/1
650 TABLET ORAL ONCE
Status: COMPLETED | OUTPATIENT
Start: 2024-01-24 | End: 2024-01-24

## 2024-01-24 RX ORDER — 0.9 % SODIUM CHLORIDE 0.9 %
1000 INTRAVENOUS SOLUTION INTRAVENOUS ONCE
Status: COMPLETED | OUTPATIENT
Start: 2024-01-24 | End: 2024-01-24

## 2024-01-24 RX ADMIN — CEFTRIAXONE SODIUM 1000 MG: 1 INJECTION, POWDER, FOR SOLUTION INTRAMUSCULAR; INTRAVENOUS at 18:35

## 2024-01-24 RX ADMIN — IOMEPROL INJECTION 75 ML: 714 INJECTION, SOLUTION INTRAVASCULAR at 17:14

## 2024-01-24 RX ADMIN — SULFAMETHOXAZOLE AND TRIMETHOPRIM 1 TABLET: 800; 160 TABLET ORAL at 18:45

## 2024-01-24 RX ADMIN — ACETAMINOPHEN 650 MG: 325 TABLET ORAL at 18:45

## 2024-01-24 RX ADMIN — SODIUM CHLORIDE 1000 ML: 9 INJECTION, SOLUTION INTRAVENOUS at 16:40

## 2024-01-24 RX ADMIN — KETOROLAC TROMETHAMINE 15 MG: 15 INJECTION, SOLUTION INTRAMUSCULAR; INTRAVENOUS at 18:52

## 2024-01-24 ASSESSMENT — PAIN - FUNCTIONAL ASSESSMENT
PAIN_FUNCTIONAL_ASSESSMENT: NONE - DENIES PAIN

## 2024-01-24 ASSESSMENT — PAIN SCALES - GENERAL
PAINLEVEL_OUTOF10: 2
PAINLEVEL_OUTOF10: 2

## 2024-01-24 ASSESSMENT — ENCOUNTER SYMPTOMS
COUGH: 1
ABDOMINAL PAIN: 0

## 2024-01-24 NOTE — ED PROVIDER NOTES
[facility] EMERGENCY DEPARTMENT  EMERGENCY DEPARTMENT ENCOUNTER      Pt Name: Milagros Fine  MRN: [id number]  Birthdate 1/7/1930  Date of evaluation: 1/24/2024  Provider: ROSY GOMES MD    CHIEF COMPLAINT       Chief Complaint   Patient presents with    Influenza     Pt from [facility] with possible flu. States fevers and increased general. States there is a current outbreak of the flu @ their facility    Abscess     ECF stated they were administering SQ fluids to pts abd for dehydration. Pts BELLO & LQ abd hot, red and tender to touch         HISTORY OF PRESENT ILLNESS   (Location/Symptom, Timing/Onset, Context/Setting, Quality, Duration, Modifying Factors, Severity)  Note limiting factors.     Milagros Fine is a 89 y.o. female who presents to the emergency department     Patient apparently presents with high fever  Apparently at the [facility]care facility there is an outbreak of influenza A  Patient was giving some subcutaneous fluids.  Patient does have a history of dementia diabetes.  History of Klebsiella UTI ESBL  It was sensitive to Bactrim about 3 to 4 months ago she has a history of dementia history of COPD she presents actually alert and oriented and is demented but pleasantly so patient is moving all 4 extremities there is no signs of severe lethargy there is no neck stiffness no petechia purpura        Nursing Notes were reviewed.    REVIEW OF SYSTEMS    (2-9 systems for level 4, 10 or more for level 5)     Review of Systems   Constitutional:  Positive for activity change, chills, fatigue and fever.   HENT:  Positive for congestion.    Respiratory:  Positive for cough.    Cardiovascular:  Negative for chest pain and leg swelling.   Gastrointestinal:  Negative for abdominal pain.   Neurological:  Positive for weakness and light-headedness. Negative for headaches.   All other systems reviewed and are negative.      Except as noted above the remainder of the review of systems was reviewed and

## 2024-01-24 NOTE — DISCHARGE INSTRUCTIONS
Try to push lots of oral fluids i.e. water or other substances  Take Keflex 500 mg 3 times a day as an antibiotic for presumptive cellulitis of the abdominal wall  Take Bactrim twice a day also as an antibiotic for presumptive cellulitis of the abdominal wall  Take the above antibiotics also for a presumptive urinary tract infection and make sure that the cultures are checked in approximately 3 days  Patient also has influenza A and needs supportive therapy    To make her feel better give her Tylenol 650 mg every 4 hours for at least 2 days  Also give her 600 mg of ibuprofen 3 times a day with food and lots of water as mentioned

## 2024-01-24 NOTE — ED NOTES
Pt to ER via EMS for reported fever and possible flu. ECF states pt has had a harsh NP cough with intermittent fevers since yesterday. States pt has been eating and drinking very little since onset of s/s. States they have been giving her SQ fluids to her abd since yesterday, but now her abd is red & hot. Pt denies N/V, SOB or furthr c/o's. Resps even and unlab. Pt noted with erythema, increased heat and c/o's pain with palpation to her BELLO & LLQ abd. Cardiac monitor applied, resps even and unlab

## 2024-01-25 PROBLEM — E86.0 DEHYDRATION: Status: RESOLVED | Noted: 2023-12-26 | Resolved: 2024-01-25

## 2024-01-25 NOTE — ED NOTES
Report called to Gladys ANDREW @ [facility]. Pts daughter, Robyn, phoned and updated on pts status and plan of care

## 2024-01-26 ENCOUNTER — APPOINTMENT (OUTPATIENT)
Dept: CT IMAGING | Age: 89
End: 2024-01-26
Payer: MEDICARE

## 2024-01-26 ENCOUNTER — HOSPITAL ENCOUNTER (EMERGENCY)
Age: 89
Discharge: HOME | End: 2024-01-26
Attending: STUDENT IN AN ORGANIZED HEALTH CARE EDUCATION/TRAINING PROGRAM
Payer: MEDICARE

## 2024-01-26 VITALS
TEMPERATURE: 97.7 F | RESPIRATION RATE: 16 BRPM | WEIGHT: 109.1 LBS | BODY MASS INDEX: 18.18 KG/M2 | DIASTOLIC BLOOD PRESSURE: 59 MMHG | SYSTOLIC BLOOD PRESSURE: 126 MMHG | HEART RATE: 59 BPM | OXYGEN SATURATION: 100 % | HEIGHT: 65 IN

## 2024-01-26 DIAGNOSIS — S01.01XA LACERATION OF SCALP, INITIAL ENCOUNTER: Primary | ICD-10-CM

## 2024-01-26 PROCEDURE — 99284 EMERGENCY DEPT VISIT MOD MDM: CPT

## 2024-01-26 PROCEDURE — 6360000002 HC RX W HCPCS: Performed by: STUDENT IN AN ORGANIZED HEALTH CARE EDUCATION/TRAINING PROGRAM

## 2024-01-26 PROCEDURE — 6370000000 HC RX 637 (ALT 250 FOR IP): Performed by: STUDENT IN AN ORGANIZED HEALTH CARE EDUCATION/TRAINING PROGRAM

## 2024-01-26 PROCEDURE — 90471 IMMUNIZATION ADMIN: CPT | Performed by: STUDENT IN AN ORGANIZED HEALTH CARE EDUCATION/TRAINING PROGRAM

## 2024-01-26 PROCEDURE — 90715 TDAP VACCINE 7 YRS/> IM: CPT | Performed by: STUDENT IN AN ORGANIZED HEALTH CARE EDUCATION/TRAINING PROGRAM

## 2024-01-26 PROCEDURE — 70450 CT HEAD/BRAIN W/O DYE: CPT

## 2024-01-26 PROCEDURE — 72125 CT NECK SPINE W/O DYE: CPT

## 2024-01-26 RX ORDER — LORAZEPAM 0.5 MG/1
0.5 TABLET ORAL EVERY 6 HOURS PRN
COMMUNITY

## 2024-01-26 RX ORDER — LORATADINE 10 MG/1
10 TABLET ORAL DAILY
COMMUNITY

## 2024-01-26 RX ORDER — ONDANSETRON 4 MG/1
4 TABLET, ORALLY DISINTEGRATING ORAL ONCE
Status: COMPLETED | OUTPATIENT
Start: 2024-01-26 | End: 2024-01-26

## 2024-01-26 RX ORDER — FUROSEMIDE 20 MG/1
20 TABLET ORAL 2 TIMES DAILY
COMMUNITY

## 2024-01-26 RX ADMIN — ONDANSETRON 4 MG: 4 TABLET, ORALLY DISINTEGRATING ORAL at 05:17

## 2024-01-26 RX ADMIN — TETANUS TOXOID, REDUCED DIPHTHERIA TOXOID AND ACELLULAR PERTUSSIS VACCINE, ADSORBED 0.5 ML: 5; 2.5; 8; 8; 2.5 SUSPENSION INTRAMUSCULAR at 05:33

## 2024-01-26 ASSESSMENT — PAIN - FUNCTIONAL ASSESSMENT: PAIN_FUNCTIONAL_ASSESSMENT: NONE - DENIES PAIN

## 2024-01-26 NOTE — ED PROVIDER NOTES
Emergency Department Encounter    Patient: Milagros Fine  MRN: [id number]  : 1930  Date of Evaluation: 2024  ED Provider:  Berto Tolentino MD    Triage Chief Complaint:   Head Laceration (Pt presents to ED via [place] [facility] from [facility] in [place] with complaints of falling and wenceslao pin cutting her head.  Small laceration noted to top of head.  Pt. Denies any LOC.)    [place]:  Milagros Fine is a 89 y.o. female with history seen below presenting with complaints of head laceration.  Patient states she got up out of bed to get to the bathroom and tripped over her feet and fell backwards.  States she is unsure what she hit but states she hit the back of her head and has a small laceration denies any neck or back pain.  States she has no chest pain or shortness of breath lightness or dizziness denies abdominal pain.  Denies pain in her extremities.  States other than pain in her occipital region she feels well.  Patient did recently have the flu as well as diagnosed with abdominal wall cellulitis states she is feeling much better from this perspective denies any chest pain, shortness of breath, lightheadedness or dizziness causing the fall.  Patient is DNR comfort care    ROS - see HPI, below listed is current ROS at time of my eval:  At least 14 systems reviewed, negative other than HPI    Past Medical History:   Diagnosis Date    Afib (HCC)     Anxiety     ARF (acute renal failure) (HCC)     Arthritis     ASHD (arteriosclerotic heart disease)     CAD (coronary artery disease)     Cancer of cheek (HCC) 04/15/2010    CHF (congestive heart failure) (HCC)     Colitis     COPD (chronic obstructive pulmonary disease) (HCC)     COVID-19 20, 11/10/2020    Dementia (HCC)     Diabetes mellitus (HCC)     Diverticulosis     Failure to thrive in adult     GERD (gastroesophageal reflux disease)     Glaucoma     Hyperlipidemia     Hypertension     Hypokalemia     OA (osteoarthritis)     Osteoporosis

## 2024-01-26 NOTE — ED NOTES
Report given to [facility].   Pt left via stretcher with EMS. VSS. No further needs.

## 2024-01-26 NOTE — DISCHARGE INSTRUCTIONS
Follow-up with primary care physician as soon as able for reevaluation.  Have staples removed in about 1 week.  Return to emergency department for any redness around the site, fevers or chills also return for any neck or back pain chest pain or shortness of breath lightheadedness or dizziness abdominal pain pain in your extremities or any new change or worsening symptoms were always here for evaluation never hesitate to return.

## 2024-01-27 LAB
BACTERIA UR CULT: ABNORMAL
ORGANISM: ABNORMAL

## 2024-01-28 LAB
BACTERIA BLD CULT ORG #2: NORMAL
BACTERIA BLD CULT: NORMAL

## 2024-12-10 ENCOUNTER — HOSPITAL ENCOUNTER (EMERGENCY)
Age: 88
Discharge: HOME | End: 2024-12-10
Attending: EMERGENCY MEDICINE
Payer: MEDICARE

## 2024-12-10 VITALS
OXYGEN SATURATION: 97 % | DIASTOLIC BLOOD PRESSURE: 61 MMHG | HEART RATE: 82 BPM | SYSTOLIC BLOOD PRESSURE: 143 MMHG | TEMPERATURE: 97.8 F | RESPIRATION RATE: 20 BRPM

## 2024-12-10 DIAGNOSIS — M19.90: ICD-10-CM

## 2024-12-10 DIAGNOSIS — M25.512 ACUTE PAIN OF LEFT SHOULDER: Primary | ICD-10-CM

## 2024-12-10 DIAGNOSIS — M75.112 INCOMPLETE TEAR OF LEFT ROTATOR CUFF: ICD-10-CM

## 2024-12-10 DIAGNOSIS — R29.818: ICD-10-CM

## 2024-12-10 PROCEDURE — 6370000000 HC RX 637 (ALT 250 FOR IP): Performed by: EMERGENCY MEDICINE

## 2024-12-10 PROCEDURE — 99283 EMERGENCY DEPT VISIT LOW MDM: CPT

## 2024-12-10 RX ORDER — ACETAMINOPHEN 325 MG/1
650 TABLET ORAL ONCE
Status: COMPLETED | OUTPATIENT
Start: 2024-12-10 | End: 2024-12-10

## 2024-12-10 RX ORDER — ONDANSETRON 4 MG/1
8 TABLET, ORALLY DISINTEGRATING ORAL ONCE
Status: COMPLETED | OUTPATIENT
Start: 2024-12-10 | End: 2024-12-10

## 2024-12-10 RX ORDER — IBUPROFEN 600 MG/1
600 TABLET, FILM COATED ORAL ONCE
Status: COMPLETED | OUTPATIENT
Start: 2024-12-10 | End: 2024-12-10

## 2024-12-10 RX ADMIN — ACETAMINOPHEN 650 MG: 325 TABLET ORAL at 10:19

## 2024-12-10 RX ADMIN — ONDANSETRON 8 MG: 4 TABLET, ORALLY DISINTEGRATING ORAL at 10:19

## 2024-12-10 RX ADMIN — IBUPROFEN 600 MG: 600 TABLET, FILM COATED ORAL at 10:19

## 2024-12-10 ASSESSMENT — PAIN DESCRIPTION - LOCATION: LOCATION: ARM;SHOULDER;LEG

## 2024-12-10 ASSESSMENT — PAIN DESCRIPTION - ORIENTATION: ORIENTATION: LEFT

## 2024-12-10 ASSESSMENT — PAIN SCALES - GENERAL
PAINLEVEL_OUTOF10: 8
PAINLEVEL_OUTOF10: 6

## 2024-12-10 ASSESSMENT — PAIN - FUNCTIONAL ASSESSMENT
PAIN_FUNCTIONAL_ASSESSMENT: 0-10
PAIN_FUNCTIONAL_ASSESSMENT: 0-10

## 2024-12-10 NOTE — ED TRIAGE NOTES
Patient having difficulty using left arm as a result of increased pain that began Sunday night while at [employer], got worse yesterday. Doesn't remember if she told staff at nursing home about this. Sensation equal,  strength equal. Pain with movement of left leg also. States she just \"hurts all over\".

## 2024-12-10 NOTE — ED NOTES
Report called back to [facility]. Patient transported back with family. Dc instructions given and reviewed, paper script for medication given. Follow up with ortho as needed.

## 2024-12-10 NOTE — ED PROVIDER NOTES
is no endovascular deficits she has a strong pulse in the left arm is not cyanotic not swollen no signs of necrotizing fasciitis no signs of compartment syndrome no swelling no acute deformity.  She has no lower extremity problems no headache no other symptoms lung sounds are clear on exam cardiovascular is irregular but rate is controlled patient is reassured I did advise him to restart Voltaren gel which we do not think that she is on but recommended we also recommending Tylenol and ibuprofen orally and follow-up with Dr. Brar  .[id number]  REASSESSMENT            CRITICAL CARE TIME     CONSULTS:  None      PROCEDURES:     Procedures    MEDICATIONS GIVEN THIS VISIT:  Medications   ibuprofen (ADVIL;MOTRIN) tablet 600 mg (has no administration in time range)   acetaminophen (TYLENOL) tablet 650 mg (has no administration in time range)   ondansetron (ZOFRAN-ODT) disintegrating tablet 8 mg (has no administration in time range)        FINAL IMPRESSION      1. Acute pain of left shoulder    2. 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 Incomplete tear of left rotator cuff            DISPOSITION/PLAN   DISPOSITION Decision To Discharge 12/10/2024 10:19:03 AM      PATIENT REFERRED TO:  Luisa Brar,   [address] [facility] [address]  [phone]    Schedule an appointment as soon as possible for a visit   As needed, If symptoms worsen      DISCHARGE MEDICATIONS:  Current Discharge Medication List          Controlled Substances Monitoring       No data to display                    (Please note that portions of this note were completed with a voice recognition program.  Efforts were made to edit the dictations but occasionally words are mis-transcribed.)    Patient was advised to return to the Emergency Department if there was any worsening.    ROSY BERGER MD (electronically signed)  Attending Emergency Physician           Rosy Berger MD  12/10/24 1024

## 2024-12-10 NOTE — DISCHARGE INSTRUCTIONS
Take Tylenol 650 mg tablet every 4 hours for severe shoulder pain  Take ibuprofen 600 mg as needed with food 2 or 3 times a day  Use Voltaren gel 3-4 times a day  Follow-up with Dr. Brar as verbally instructed above

## 2025-02-01 ENCOUNTER — HOSPITAL ENCOUNTER (EMERGENCY)
Age: 89
Discharge: HOME | End: 2025-02-01
Attending: EMERGENCY MEDICINE
Payer: MEDICARE

## 2025-02-01 ENCOUNTER — APPOINTMENT (OUTPATIENT)
Dept: GENERAL RADIOLOGY | Age: 89
End: 2025-02-01
Payer: MEDICARE

## 2025-02-01 VITALS
RESPIRATION RATE: 16 BRPM | TEMPERATURE: 98.5 F | SYSTOLIC BLOOD PRESSURE: 149 MMHG | OXYGEN SATURATION: 96 % | DIASTOLIC BLOOD PRESSURE: 60 MMHG | HEART RATE: 75 BPM

## 2025-02-01 DIAGNOSIS — J18.9 COMMUNITY ACQUIRED PNEUMONIA, UNSPECIFIED LATERALITY: Primary | ICD-10-CM

## 2025-02-01 DIAGNOSIS — R05.1 ACUTE COUGH: ICD-10-CM

## 2025-02-01 LAB
GLUCOSE BLD-MCNC: 240 MG/DL
GLUCOSE BLD-MCNC: 240 MG/DL (ref 70–99)
PERFORMED ON: ABNORMAL

## 2025-02-01 PROCEDURE — 99284 EMERGENCY DEPT VISIT MOD MDM: CPT

## 2025-02-01 PROCEDURE — 6370000000 HC RX 637 (ALT 250 FOR IP): Performed by: EMERGENCY MEDICINE

## 2025-02-01 PROCEDURE — 71045 X-RAY EXAM CHEST 1 VIEW: CPT

## 2025-02-01 RX ORDER — DOXYCYCLINE HYCLATE 100 MG
100 TABLET ORAL ONCE
Status: COMPLETED | OUTPATIENT
Start: 2025-02-01 | End: 2025-02-01

## 2025-02-01 RX ORDER — DOXYCYCLINE HYCLATE 100 MG
100 TABLET ORAL 2 TIMES DAILY
Qty: 14 TABLET | Refills: 0 | Status: SHIPPED | OUTPATIENT
Start: 2025-02-01 | End: 2025-02-08

## 2025-02-01 RX ORDER — FAMOTIDINE 20 MG/1
40 TABLET, FILM COATED ORAL ONCE
Status: COMPLETED | OUTPATIENT
Start: 2025-02-01 | End: 2025-02-01

## 2025-02-01 RX ORDER — IPRATROPIUM BROMIDE AND ALBUTEROL SULFATE 2.5; .5 MG/3ML; MG/3ML
1 SOLUTION RESPIRATORY (INHALATION) ONCE
Status: COMPLETED | OUTPATIENT
Start: 2025-02-01 | End: 2025-02-01

## 2025-02-01 RX ORDER — BENZONATATE 100 MG/1
100 CAPSULE ORAL ONCE
Status: COMPLETED | OUTPATIENT
Start: 2025-02-01 | End: 2025-02-01

## 2025-02-01 RX ADMIN — FAMOTIDINE 40 MG: 20 TABLET, FILM COATED ORAL at 21:14

## 2025-02-01 RX ADMIN — IPRATROPIUM BROMIDE AND ALBUTEROL SULFATE 1 DOSE: 2.5; .5 SOLUTION RESPIRATORY (INHALATION) at 21:48

## 2025-02-01 RX ADMIN — BENZONATATE 100 MG: 100 CAPSULE ORAL at 21:47

## 2025-02-01 RX ADMIN — LIDOCAINE HYDROCHLORIDE: 20 SOLUTION ORAL at 21:14

## 2025-02-01 RX ADMIN — DOXYCYCLINE HYCLATE 100 MG: 100 TABLET, COATED ORAL at 21:47

## 2025-02-01 ASSESSMENT — PAIN DESCRIPTION - DESCRIPTORS: DESCRIPTORS: BURNING

## 2025-02-01 ASSESSMENT — PAIN DESCRIPTION - FREQUENCY: FREQUENCY: CONTINUOUS

## 2025-02-01 ASSESSMENT — ENCOUNTER SYMPTOMS
COUGH: 1
EYE PAIN: 0
EYE REDNESS: 0
VOMITING: 0
DIARRHEA: 0
RHINORRHEA: 0
NAUSEA: 0
CONSTIPATION: 0
BACK PAIN: 0
ABDOMINAL PAIN: 0
SHORTNESS OF BREATH: 0

## 2025-02-01 ASSESSMENT — PAIN SCALES - GENERAL: PAINLEVEL_OUTOF10: 5

## 2025-02-01 ASSESSMENT — PAIN DESCRIPTION - LOCATION: LOCATION: CHEST

## 2025-02-01 ASSESSMENT — PAIN DESCRIPTION - PAIN TYPE: TYPE: ACUTE PAIN

## 2025-02-01 ASSESSMENT — PAIN - FUNCTIONAL ASSESSMENT: PAIN_FUNCTIONAL_ASSESSMENT: 0-10

## 2025-02-02 NOTE — ED PROVIDER NOTES
Nose: Nose normal. No congestion or rhinorrhea.      Mouth/Throat:      Mouth: Mucous membranes are moist.      Pharynx: No oropharyngeal exudate or posterior oropharyngeal erythema.   Eyes:      General:         Right eye: No discharge.         Left eye: No discharge.      Extraocular Movements: Extraocular movements intact.      Pupils: Pupils are equal, round, and reactive to light.   Cardiovascular:      Rate and Rhythm: Normal rate and regular rhythm.      Pulses: Normal pulses.      Heart sounds: Normal heart sounds. No murmur heard.  Pulmonary:      Effort: Pulmonary effort is normal. No respiratory distress.      Breath sounds: Normal breath sounds. No wheezing.   Abdominal:      General: Abdomen is flat. There is no distension.      Palpations: Abdomen is soft.      Tenderness: There is no abdominal tenderness.   Musculoskeletal:         General: No swelling or tenderness. Normal range of motion.      Cervical back: Normal range of motion. No rigidity or tenderness.   Skin:     General: Skin is warm and dry.      Capillary Refill: Capillary refill takes less than 2 seconds.      Coloration: Skin is not jaundiced.      Findings: No erythema.   Neurological:      General: No focal deficit present.      Mental Status: She is alert. Mental status is at baseline.      Cranial Nerves: No cranial nerve deficit.   Psychiatric:         Mood and Affect: Mood normal.         Behavior: Behavior normal.       DIAGNOSTIC RESULTS   EKG: N/A    RADIOLOGY:   Non-plain film images such as CT, Ultrasound and MRI are read by the radiologist.   Plain radiographic images are visualized and preliminarily interpreted by the ED Provider with the below findings:  - Bibasilar opacities  Interpretation per Radiologist below, if available at the time of this note:  XR CHEST PORTABLE   Final Result   Bibasilar opacities compatible with atelectasis. In the appropriate clinical   setting pneumonia is not excluded.           Last 7 days No

## 2025-02-03 NOTE — ED NOTES
2/2/25    A Sinai Ybarra called from the T called (but it was a 513) area code.  She requested for us to fax over chest xray results etc for this patient.  I told her she could call medical records and request those results on Monday.  She seemed quite irritated with the response.  Trisha Monroy RN,

## 2025-03-20 ENCOUNTER — HOSPITAL ENCOUNTER (OUTPATIENT)
Dept: MRI IMAGING | Age: 89
Discharge: HOME | End: 2025-03-20
Attending: ORTHOPAEDIC SURGERY
Payer: MEDICARE

## 2025-03-20 DIAGNOSIS — D16.9 OSTEOCHONDROMATOSIS: ICD-10-CM

## 2025-03-20 LAB
PERFORMED ON: ABNORMAL
POC CREATININE: 0.9 MG/DL (ref 0.6–1.2)
POC SAMPLE TYPE: ABNORMAL

## 2025-03-20 PROCEDURE — 72197 MRI PELVIS W/O & W/DYE: CPT

## 2025-03-20 PROCEDURE — 82565 ASSAY OF CREATININE: CPT

## 2025-03-20 PROCEDURE — A9579 GAD-BASE MR CONTRAST NOS,1ML: HCPCS | Performed by: ORTHOPAEDIC SURGERY

## 2025-03-20 PROCEDURE — 6360000004 HC RX CONTRAST MEDICATION: Performed by: ORTHOPAEDIC SURGERY

## 2025-03-20 RX ADMIN — GADOTERIDOL 10 ML: 279.3 INJECTION, SOLUTION INTRAVENOUS at 11:29

## (undated) DEVICE — ELECTRODE,RADIOTRANSLUCENT,FOAM,3PK: Brand: MEDLINE

## (undated) DEVICE — ENDOSCOPIC KIT 2 12 FT OP4 DE2 GWN SYR

## (undated) DEVICE — YANKAUER,BULB TIP,W/O VENT,RIGID,STERILE: Brand: MEDLINE

## (undated) DEVICE — CONMED SCOPE SAVER BITE BLOCK, 20X27 MM: Brand: SCOPE SAVER

## (undated) DEVICE — FORCEP BX STD CAP 240CM RAD JAW 4

## (undated) DEVICE — CANNULA,OXY,ADULT,SUPERSOFT,W/7'TUB,SC: Brand: MEDLINE INDUSTRIES, INC.

## (undated) DEVICE — SNARE ENDOSCP L240CM OD24MM LOOP W15MM RND INSUL STIFF BRAID

## (undated) DEVICE — ENDO CARRY-ON PROCEDURE KIT INCLUDES SUCTION TUBING, LUBRICANT, GAUZE, BIOHAZARD STICKER, TRANSPORT PAD AND INTERCEPT BEDSIDE KIT.: Brand: ENDO CARRY-ON PROCEDURE KIT

## (undated) DEVICE — TRAP POLYP ETRAP